# Patient Record
Sex: MALE | Race: OTHER | NOT HISPANIC OR LATINO
[De-identification: names, ages, dates, MRNs, and addresses within clinical notes are randomized per-mention and may not be internally consistent; named-entity substitution may affect disease eponyms.]

---

## 2022-07-13 PROBLEM — Z00.00 ENCOUNTER FOR PREVENTIVE HEALTH EXAMINATION: Status: ACTIVE | Noted: 2022-07-13

## 2022-08-22 ENCOUNTER — APPOINTMENT (OUTPATIENT)
Dept: NEUROLOGY | Facility: CLINIC | Age: 77
End: 2022-08-22

## 2022-08-22 VITALS
WEIGHT: 170 LBS | SYSTOLIC BLOOD PRESSURE: 120 MMHG | DIASTOLIC BLOOD PRESSURE: 72 MMHG | BODY MASS INDEX: 25.18 KG/M2 | HEART RATE: 62 BPM | HEIGHT: 69 IN

## 2022-08-22 DIAGNOSIS — Z85.038 PERSONAL HISTORY OF OTHER MALIGNANT NEOPLASM OF LARGE INTESTINE: ICD-10-CM

## 2022-08-22 PROCEDURE — 99205 OFFICE O/P NEW HI 60 MIN: CPT

## 2022-08-22 RX ORDER — CYCLOSPORINE 0.5 MG/ML
0.05 EMULSION OPHTHALMIC
Qty: 180 | Refills: 0 | Status: ACTIVE | COMMUNITY
Start: 2022-08-16

## 2022-08-22 NOTE — HISTORY OF PRESENT ILLNESS
[FreeTextEntry1] : The patient is a 77-year-old right-handed man who comes to be evaluated for tremors and trouble walking.\par \par As for the tremor, he has had tremors of his hands for least the last 3 to 4 years, which has been recently progressed.  The tremors are present at rest but also when he is holding a cup or trying to use utensils.  The tremor affects both hands, though he is more aware of the right hand.  There is no voice or leg tremors.  The difficulty walking is a more recent development, since about May 2021.\par \par His voice is a little lower, and he has decreased blink rate.  He has no drooling or dysphagia.  He does have trouble turning over in bed.  His handwriting has become smaller.  He does need some help with dressing but otherwise is independent.  He has fallen, without loss of consciousness.  He has no freezing of gait, but does feel his left leg gets stuck sometimes.\par \par His memory is poor.  He does have anxiety but no depression.  He has no hallucinations.  He does have a history of acting out his dreams and watching TV at night but he no longer does that.  He has no constipation, has an ostomy bag since he had colon cancer.  He also had prostate cancer.  He has no orthostasis.  There is no known family history of Parkinson disease or other movement disorders.  So far he has not had any imaging or work-up.

## 2022-08-22 NOTE — PHYSICAL EXAM
[Person] : oriented to person [Place] : oriented to place [Time] : oriented to time [Registration Intact] : recent registration memory intact [Naming Objects] : no difficulty naming common objects [Repeating Phrases] : no difficulty repeating a phrase [Writing A Sentence] : no difficulty writing a sentence [Fluency] : fluency intact [Comprehension] : comprehension intact [Reading] : reading intact [Cranial Nerves Optic (II)] : visual acuity intact bilaterally,  visual fields full to confrontation, pupils equal round and reactive to light [Cranial Nerves Oculomotor (III)] : extraocular motion intact [Motor Strength] : muscle strength was normal in all four extremities [Motor Handedness Right-Handed] : the patient is right hand dominant [Short Term Intact] : short term memory impaired [Cranial Nerves Trigeminal (V)] : facial sensation intact symmetrically [Cranial Nerves Facial (VII)] : face symmetrical [Cranial Nerves Vestibulocochlear (VIII)] : hearing was intact bilaterally [Cranial Nerves Glossopharyngeal (IX)] : tongue and palate midline [Cranial Nerves Accessory (XI - Cranial And Spinal)] : head turning and shoulder shrug symmetric [Cranial Nerves Hypoglossal (XII)] : there was no tongue deviation with protrusion [Sensation Tactile Decrease] : light touch was intact [Sensation Vibration Decrease] : vibration was intact [Dysdiadochokinesia Bilaterally] : not present [Coordination - Dysmetria Impaired Finger-to-Nose Bilateral] : not present [Coordination - Dysmetria Impaired Heel-to-Shin Bilateral] : not present [1+] : Ankle jerk left 1+ [Plantar Reflex Right Only] : normal on the right [Plantar Reflex Left Only] : normal on the left [FreeTextEntry1] : Blink rate was reduced, but facial expression was otherwise normal.  Voice was slightly lower.  Extraocular movements were intact with normal saccades and no square wave jerks seen.  Tone was moderately increased at the neck, but minimally if at all increased in the arms and legs.  Rapid alternating movements and finger taps were slowed bilaterally, slightly worse on the right.  Foot tap and toe tap were slowed bilaterally, worse on the left.\par He got up from the chair without using his arms.  His posture was mildly stooped.  Gait was slow with reduced stride and mild left leg dragging.  Pull test was negative.\par He had a mild intermittent bilateral rest tremor, worse on the right.  He also had a left greater than kinetic tremor.  Spiral drawing was more shaky on the left with a clear axis.

## 2022-08-22 NOTE — DISCUSSION/SUMMARY
[FreeTextEntry1] : In summary, the patient is 77-year-old right-handed man with hand tremors and difficulty walking.  The examination was significant for right greater than left resting tremor with left greater than right kinetic tremor, neck rigidity, bilateral bradykinesia and slow walking.  He also did have some issues with memory.\par \par Overall, the history examination is probably consistent with more than one diagnosis.  The kinetic tremor is most consistent with essential tremor, which is mild, and also explains his difficulty using utensils and cups.  However the rest tremor With the bradykinesia and slow walking is more consistent with a parkinsonian disorder.  While a DaTscan might clarify the presence of a dopaminergic deficit, he has already received significant radiation from his cancer treatment, and I am hesitant to add more.\par \par Because of the memory issues, I did recommend an MRI of the brain, which also would assess for vascular parkinsonism.  I also recommend that memory labs be checked.\par \par As for treatment I did recommend a trial of levodopa, starting with 1 pill a day, leading up to 1 pill 3 times a day.  At that point he can call me and report how he is doing.  I would not start any medications for his essential tremor, since his mild on those medications may affect his memory.\par \par I spent approximately 60 minutes with the patient and his family, examining and discussing the above findings and impression.  Follow-up will be in 5 months, sooner if new problems arise.

## 2022-09-11 ENCOUNTER — OUTPATIENT (OUTPATIENT)
Dept: OUTPATIENT SERVICES | Facility: HOSPITAL | Age: 77
LOS: 1 days | End: 2022-09-11
Payer: MEDICARE

## 2022-09-11 ENCOUNTER — APPOINTMENT (OUTPATIENT)
Dept: MRI IMAGING | Facility: CLINIC | Age: 77
End: 2022-09-11

## 2022-09-11 DIAGNOSIS — Z00.8 ENCOUNTER FOR OTHER GENERAL EXAMINATION: ICD-10-CM

## 2022-09-11 PROCEDURE — 70551 MRI BRAIN STEM W/O DYE: CPT | Mod: 26,MH

## 2022-09-11 PROCEDURE — 70551 MRI BRAIN STEM W/O DYE: CPT

## 2022-09-19 LAB
FOLATE SERPL-MCNC: 18.9 NG/ML
TSH SERPL-ACNC: 0.98 UIU/ML
VIT B12 SERPL-MCNC: 472 PG/ML

## 2022-09-23 ENCOUNTER — NON-APPOINTMENT (OUTPATIENT)
Age: 77
End: 2022-09-23

## 2022-09-25 ENCOUNTER — TRANSCRIPTION ENCOUNTER (OUTPATIENT)
Age: 77
End: 2022-09-25

## 2022-11-11 ENCOUNTER — NON-APPOINTMENT (OUTPATIENT)
Age: 77
End: 2022-11-11

## 2022-11-15 ENCOUNTER — RX RENEWAL (OUTPATIENT)
Age: 77
End: 2022-11-15

## 2022-12-16 ENCOUNTER — APPOINTMENT (OUTPATIENT)
Dept: COLORECTAL SURGERY | Facility: CLINIC | Age: 77
End: 2022-12-16
Payer: MEDICARE

## 2022-12-16 VITALS
BODY MASS INDEX: 25.62 KG/M2 | HEART RATE: 69 BPM | HEIGHT: 69 IN | DIASTOLIC BLOOD PRESSURE: 63 MMHG | SYSTOLIC BLOOD PRESSURE: 100 MMHG | OXYGEN SATURATION: 98 % | WEIGHT: 173 LBS | TEMPERATURE: 98.3 F

## 2022-12-16 PROCEDURE — 99212 OFFICE O/P EST SF 10 MIN: CPT

## 2022-12-16 PROCEDURE — 99202 OFFICE O/P NEW SF 15 MIN: CPT

## 2022-12-16 RX ORDER — NIRMATRELVIR AND RITONAVIR 300-100 MG
20 X 150 MG & KIT ORAL
Qty: 30 | Refills: 0 | Status: COMPLETED | COMMUNITY
Start: 2022-04-11 | End: 2022-12-16

## 2022-12-16 NOTE — HISTORY OF PRESENT ILLNESS
[FreeTextEntry1] : 77 year old male pt former pt of Dr. Richard at [facility].\par S/p 9/24/19 APR with permanent end colostomy for rectal cancer (stage 1; pT2N0).\par \par CT chest abdomen and pelvis 11/8/21- negative for metastasis\par \par Would like to have his colostomy checked since it's been awhile. \par \par Here for a check up with Dr. Richard\par \par Had radiation cystitis and had 42 2 hour hyperbaric treatment last year to treat that so don't want anymore CT in that area\par \par Doing well\par Appetite good\par Denies fevers, chills, nausea or vomiting\par Colostomy is functioning well just wants to have it checked\par Changes appliance twice a week and as needed for leaks\par Voiding ok \par Takes walks every day\par

## 2022-12-16 NOTE — PHYSICAL EXAM
[No HSM] : no hepatosplenomegaly [Abdomen Masses] : No abdominal masses [Abdomen Tenderness] : ~T No ~M abdominal tenderness [No Rash or Lesion] : No rash or lesion [Alert] : alert [Oriented to Person] : oriented to person [Oriented to Place] : oriented to place [Oriented to Time] : oriented to time [Calm] : calm [de-identified] : Soft, non tender; healthy colostomy; Colostomy appliance changed.  Wearing Fairchance 2 3/4" convex wafer with barrier ring and closed end pouch.  Pt would benefit from 2 1/4" convex but wife is comfortable with she is currently using and does not want to change [de-identified] : Perineal flap - well healed, no issues [de-identified] : Normal [de-identified] : Normal [de-identified] : Normal [de-identified] : Normal [de-identified] : Normal

## 2022-12-16 NOTE — ASSESSMENT
[FreeTextEntry1] : 77 M here for follow up. He is s/p APR w/ perineal flap three years ago. Doing well. he had stage 1 rectal cancer.\par Plan:\par continue with colonoscopy surveillance as recommended by Dr Paez.\par See again as needed.\par All questions answered.

## 2023-02-15 ENCOUNTER — APPOINTMENT (OUTPATIENT)
Dept: NEUROLOGY | Facility: CLINIC | Age: 78
End: 2023-02-15
Payer: MEDICARE

## 2023-02-15 VITALS
SYSTOLIC BLOOD PRESSURE: 134 MMHG | HEIGHT: 69 IN | DIASTOLIC BLOOD PRESSURE: 72 MMHG | WEIGHT: 180 LBS | BODY MASS INDEX: 26.66 KG/M2 | HEART RATE: 64 BPM

## 2023-02-15 PROCEDURE — 99214 OFFICE O/P EST MOD 30 MIN: CPT

## 2023-02-15 NOTE — PHYSICAL EXAM
[Person] : oriented to person [Place] : oriented to place [Time] : oriented to time [Naming Objects] : no difficulty naming common objects [Repeating Phrases] : no difficulty repeating a phrase [Fluency] : fluency intact [Comprehension] : comprehension intact [Cranial Nerves Optic (II)] : visual acuity intact bilaterally,  visual fields full to confrontation, pupils equal round and reactive to light [Cranial Nerves Oculomotor (III)] : extraocular motion intact [Cranial Nerves Trigeminal (V)] : facial sensation intact symmetrically [Cranial Nerves Facial (VII)] : face symmetrical [Cranial Nerves Vestibulocochlear (VIII)] : hearing was intact bilaterally [Cranial Nerves Accessory (XI - Cranial And Spinal)] : head turning and shoulder shrug symmetric [Cranial Nerves Glossopharyngeal (IX)] : tongue and palate midline [Cranial Nerves Hypoglossal (XII)] : there was no tongue deviation with protrusion [Motor Strength] : muscle strength was normal in all four extremities [Motor Handedness Right-Handed] : the patient is right hand dominant [Sensation Tactile Decrease] : light touch was intact [Sensation Vibration Decrease] : vibration was intact [1+] : Ankle jerk left 1+ [Dysdiadochokinesia Bilaterally] : not present [Coordination - Dysmetria Impaired Finger-to-Nose Bilateral] : not present [Coordination - Dysmetria Impaired Heel-to-Shin Bilateral] : not present [Plantar Reflex Right Only] : normal on the right [Plantar Reflex Left Only] : normal on the left [FreeTextEntry1] : Blink rate was reduced, but facial expression was otherwise normal.  Voice was slightly lower.  Extraocular movements were intact with normal saccades and no square wave jerks seen.  \par \par Tone was moderately increased at the neck, but minimally if at all increased in the arms and legs.  \par \par Rapid alternating movements and finger taps were slowed bilaterally, slightly worse on the left today.  Foot tap and toe tap were slowed bilaterally, worse on the left.\par \par He got up from the chair without using his arms.  His posture was mildly stooped.  Gait was slow with improved stride and mild less leg dragging.  Pull test was negative.\par \par No rest tremor today. Spiral drawing was more shaky on the left with a clear axis.

## 2023-02-15 NOTE — DISCUSSION/SUMMARY
[FreeTextEntry1] : In summary, the patient is 77-year-old right-handed man with hand tremors and difficulty walking.  The examination was significant for right greater than left resting tremor with left greater than right kinetic tremor, neck rigidity, bilateral bradykinesia and slow walking.  He also did have some issues with memory.\par \par Overall, the history examination is probably consistent with more than one diagnosis.  \par \par 1. The kinetic tremor is most consistent with essential tremor, which is mild, and also explains his difficulty using utensils and cups.  Can hold off on treatment. \par 2. However the rest tremor With the bradykinesia and slow walking is more consistent with a parkinsonian disorder. This is better on LD\par 3. Continue ASA 81 mg\par 4. Advised to exercise\par \par We discussed the above impression, plan and recommendations during the visit. Counseling represented more then 50% of the 30 minute visit time

## 2023-02-15 NOTE — HISTORY OF PRESENT ILLNESS
[FreeTextEntry1] : The patient is a 77-year-old right-handed man who comes to be evaluated for tremors and trouble walking.\par \par As for the tremor, he has had tremors of his hands for least the last 3 to 4 years, which has been recently progressed.  The tremors are present at rest but also when he is holding a cup or trying to use utensils.  The tremor affects both hands, though he is more aware of the right hand.  There is no voice or leg tremors.  The difficulty walking is a more recent development, since about May 2021.\par \par 1. Started LD, initially developed diarrhea, now on it and it is helping. Takes it morning, midday and evening. \par 2. His voice is better, and he has decreased blink rate.  He has no drooling or dysphagia.  He does have less trouble turning over in bed.  His handwriting has become smaller.  ADLs more independent.  One fall recently. He has no freezing of gait, but does feel his left leg gets stuck sometimes.\par 3. His memory is poor.  He does have anxiety but no depression.  He has no hallucinations.  More talking in his sleeps, no acting out.  He has no constipation, has an ostomy bag since he had colon cancer.  He also had prostate cancer.  He has no orthostasis. \par 4. MRI showed WMD and old lacunar infarcts, advised to start ASA81 mg B12 and TSH were ok

## 2023-04-04 ENCOUNTER — NON-APPOINTMENT (OUTPATIENT)
Age: 78
End: 2023-04-04

## 2023-04-07 ENCOUNTER — OUTPATIENT (OUTPATIENT)
Dept: OUTPATIENT SERVICES | Facility: HOSPITAL | Age: 78
LOS: 1 days | End: 2023-04-07
Payer: MEDICARE

## 2023-04-07 ENCOUNTER — APPOINTMENT (OUTPATIENT)
Dept: MRI IMAGING | Facility: CLINIC | Age: 78
End: 2023-04-07
Payer: MEDICARE

## 2023-04-07 DIAGNOSIS — G20 PARKINSON'S DISEASE: ICD-10-CM

## 2023-04-07 PROCEDURE — 76377 3D RENDER W/INTRP POSTPROCES: CPT

## 2023-04-07 PROCEDURE — 70551 MRI BRAIN STEM W/O DYE: CPT

## 2023-04-07 PROCEDURE — 70547 MR ANGIOGRAPHY NECK W/O DYE: CPT

## 2023-04-07 PROCEDURE — 70547 MR ANGIOGRAPHY NECK W/O DYE: CPT | Mod: 26,MH

## 2023-04-07 PROCEDURE — 70544 MR ANGIOGRAPHY HEAD W/O DYE: CPT

## 2023-04-07 PROCEDURE — 70551 MRI BRAIN STEM W/O DYE: CPT | Mod: 26,MH

## 2023-04-07 PROCEDURE — 70544 MR ANGIOGRAPHY HEAD W/O DYE: CPT | Mod: 26,MH,59

## 2023-04-10 ENCOUNTER — NON-APPOINTMENT (OUTPATIENT)
Age: 78
End: 2023-04-10

## 2023-05-03 ENCOUNTER — INPATIENT (INPATIENT)
Facility: HOSPITAL | Age: 78
LOS: 1 days | Discharge: HOME | End: 2023-05-05
Attending: INTERNAL MEDICINE | Admitting: INTERNAL MEDICINE
Payer: MEDICARE

## 2023-05-03 VITALS
HEART RATE: 68 BPM | RESPIRATION RATE: 18 BRPM | TEMPERATURE: 99 F | OXYGEN SATURATION: 100 % | DIASTOLIC BLOOD PRESSURE: 60 MMHG | SYSTOLIC BLOOD PRESSURE: 123 MMHG

## 2023-05-03 DIAGNOSIS — R47.81 SLURRED SPEECH: ICD-10-CM

## 2023-05-03 DIAGNOSIS — Z90.49 ACQUIRED ABSENCE OF OTHER SPECIFIED PARTS OF DIGESTIVE TRACT: Chronic | ICD-10-CM

## 2023-05-03 DIAGNOSIS — K62.7 RADIATION PROCTITIS: ICD-10-CM

## 2023-05-03 DIAGNOSIS — C61 MALIGNANT NEOPLASM OF PROSTATE: ICD-10-CM

## 2023-05-03 DIAGNOSIS — R47.1 DYSARTHRIA AND ANARTHRIA: ICD-10-CM

## 2023-05-03 DIAGNOSIS — Z86.73 PERSONAL HISTORY OF TRANSIENT ISCHEMIC ATTACK (TIA), AND CEREBRAL INFARCTION WITHOUT RESIDUAL DEFICITS: ICD-10-CM

## 2023-05-03 DIAGNOSIS — Z29.9 ENCOUNTER FOR PROPHYLACTIC MEASURES, UNSPECIFIED: ICD-10-CM

## 2023-05-03 LAB
ALBUMIN SERPL ELPH-MCNC: 4.5 G/DL — SIGNIFICANT CHANGE UP (ref 3.3–5)
ALP SERPL-CCNC: 68 U/L — SIGNIFICANT CHANGE UP (ref 40–120)
ALT FLD-CCNC: 15 U/L — SIGNIFICANT CHANGE UP (ref 4–41)
ANION GAP SERPL CALC-SCNC: 10 MMOL/L — SIGNIFICANT CHANGE UP (ref 7–14)
APPEARANCE UR: ABNORMAL
APTT BLD: 31 SEC — SIGNIFICANT CHANGE UP (ref 27–36.3)
AST SERPL-CCNC: 17 U/L — SIGNIFICANT CHANGE UP (ref 4–40)
BACTERIA # UR AUTO: NEGATIVE — SIGNIFICANT CHANGE UP
BASE EXCESS BLDV CALC-SCNC: 2.8 MMOL/L — SIGNIFICANT CHANGE UP (ref -2–3)
BASOPHILS # BLD AUTO: 0.04 K/UL — SIGNIFICANT CHANGE UP (ref 0–0.2)
BASOPHILS NFR BLD AUTO: 0.5 % — SIGNIFICANT CHANGE UP (ref 0–2)
BILIRUB SERPL-MCNC: 0.5 MG/DL — SIGNIFICANT CHANGE UP (ref 0.2–1.2)
BILIRUB UR-MCNC: NEGATIVE — SIGNIFICANT CHANGE UP
BLOOD GAS VENOUS COMPREHENSIVE RESULT: SIGNIFICANT CHANGE UP
BUN SERPL-MCNC: 18 MG/DL — SIGNIFICANT CHANGE UP (ref 7–23)
CALCIUM SERPL-MCNC: 9.6 MG/DL — SIGNIFICANT CHANGE UP (ref 8.4–10.5)
CHLORIDE BLDV-SCNC: 104 MMOL/L — SIGNIFICANT CHANGE UP (ref 96–108)
CHLORIDE SERPL-SCNC: 104 MMOL/L — SIGNIFICANT CHANGE UP (ref 98–107)
CO2 BLDV-SCNC: 31 MMOL/L — HIGH (ref 22–26)
CO2 SERPL-SCNC: 26 MMOL/L — SIGNIFICANT CHANGE UP (ref 22–31)
COLOR SPEC: YELLOW — SIGNIFICANT CHANGE UP
CREAT SERPL-MCNC: 0.96 MG/DL — SIGNIFICANT CHANGE UP (ref 0.5–1.3)
DIFF PNL FLD: NEGATIVE — SIGNIFICANT CHANGE UP
EGFR: 81 ML/MIN/1.73M2 — SIGNIFICANT CHANGE UP
EOSINOPHIL # BLD AUTO: 0.21 K/UL — SIGNIFICANT CHANGE UP (ref 0–0.5)
EOSINOPHIL NFR BLD AUTO: 2.4 % — SIGNIFICANT CHANGE UP (ref 0–6)
EPI CELLS # UR: 1 /HPF — SIGNIFICANT CHANGE UP (ref 0–5)
GAS PNL BLDV: 137 MMOL/L — SIGNIFICANT CHANGE UP (ref 136–145)
GLUCOSE BLDC GLUCOMTR-MCNC: 132 MG/DL — HIGH (ref 70–99)
GLUCOSE BLDV-MCNC: 95 MG/DL — SIGNIFICANT CHANGE UP (ref 70–99)
GLUCOSE SERPL-MCNC: 98 MG/DL — SIGNIFICANT CHANGE UP (ref 70–99)
GLUCOSE UR QL: NEGATIVE — SIGNIFICANT CHANGE UP
HCO3 BLDV-SCNC: 29 MMOL/L — SIGNIFICANT CHANGE UP (ref 22–29)
HCT VFR BLD CALC: 42.6 % — SIGNIFICANT CHANGE UP (ref 39–50)
HCT VFR BLDA CALC: 41 % — SIGNIFICANT CHANGE UP (ref 39–51)
HGB BLD CALC-MCNC: 13.8 G/DL — SIGNIFICANT CHANGE UP (ref 12.6–17.4)
HGB BLD-MCNC: 13.6 G/DL — SIGNIFICANT CHANGE UP (ref 13–17)
IANC: 6.68 K/UL — SIGNIFICANT CHANGE UP (ref 1.8–7.4)
IMM GRANULOCYTES NFR BLD AUTO: 0.8 % — SIGNIFICANT CHANGE UP (ref 0–0.9)
INR BLD: 1.03 RATIO — SIGNIFICANT CHANGE UP (ref 0.88–1.16)
KETONES UR-MCNC: NEGATIVE — SIGNIFICANT CHANGE UP
LACTATE BLDV-MCNC: 0.9 MMOL/L — SIGNIFICANT CHANGE UP (ref 0.5–2)
LEUKOCYTE ESTERASE UR-ACNC: NEGATIVE — SIGNIFICANT CHANGE UP
LYMPHOCYTES # BLD AUTO: 1.11 K/UL — SIGNIFICANT CHANGE UP (ref 1–3.3)
LYMPHOCYTES # BLD AUTO: 12.6 % — LOW (ref 13–44)
MCHC RBC-ENTMCNC: 28.9 PG — SIGNIFICANT CHANGE UP (ref 27–34)
MCHC RBC-ENTMCNC: 31.9 GM/DL — LOW (ref 32–36)
MCV RBC AUTO: 90.4 FL — SIGNIFICANT CHANGE UP (ref 80–100)
MONOCYTES # BLD AUTO: 0.73 K/UL — SIGNIFICANT CHANGE UP (ref 0–0.9)
MONOCYTES NFR BLD AUTO: 8.3 % — SIGNIFICANT CHANGE UP (ref 2–14)
NEUTROPHILS # BLD AUTO: 6.68 K/UL — SIGNIFICANT CHANGE UP (ref 1.8–7.4)
NEUTROPHILS NFR BLD AUTO: 75.4 % — SIGNIFICANT CHANGE UP (ref 43–77)
NITRITE UR-MCNC: NEGATIVE — SIGNIFICANT CHANGE UP
NRBC # BLD: 0 /100 WBCS — SIGNIFICANT CHANGE UP (ref 0–0)
NRBC # FLD: 0 K/UL — SIGNIFICANT CHANGE UP (ref 0–0)
PCO2 BLDV: 52 MMHG — SIGNIFICANT CHANGE UP (ref 42–55)
PH BLDV: 7.36 — SIGNIFICANT CHANGE UP (ref 7.32–7.43)
PH UR: 6.5 — SIGNIFICANT CHANGE UP (ref 5–8)
PLATELET # BLD AUTO: 207 K/UL — SIGNIFICANT CHANGE UP (ref 150–400)
PO2 BLDV: 22 MMHG — LOW (ref 25–45)
POTASSIUM BLDV-SCNC: 4.1 MMOL/L — SIGNIFICANT CHANGE UP (ref 3.5–5.1)
POTASSIUM SERPL-MCNC: 4.2 MMOL/L — SIGNIFICANT CHANGE UP (ref 3.5–5.3)
POTASSIUM SERPL-SCNC: 4.2 MMOL/L — SIGNIFICANT CHANGE UP (ref 3.5–5.3)
PROT SERPL-MCNC: 7.2 G/DL — SIGNIFICANT CHANGE UP (ref 6–8.3)
PROT UR-MCNC: ABNORMAL
PROTHROM AB SERPL-ACNC: 12 SEC — SIGNIFICANT CHANGE UP (ref 10.5–13.4)
RBC # BLD: 4.71 M/UL — SIGNIFICANT CHANGE UP (ref 4.2–5.8)
RBC # FLD: 14.2 % — SIGNIFICANT CHANGE UP (ref 10.3–14.5)
RBC CASTS # UR COMP ASSIST: 2 /HPF — SIGNIFICANT CHANGE UP (ref 0–4)
SAO2 % BLDV: 25.2 % — LOW (ref 67–88)
SODIUM SERPL-SCNC: 140 MMOL/L — SIGNIFICANT CHANGE UP (ref 135–145)
SP GR SPEC: 1.02 — SIGNIFICANT CHANGE UP (ref 1.01–1.05)
TROPONIN T, HIGH SENSITIVITY RESULT: 16 NG/L — SIGNIFICANT CHANGE UP
UROBILINOGEN FLD QL: SIGNIFICANT CHANGE UP
WBC # BLD: 8.84 K/UL — SIGNIFICANT CHANGE UP (ref 3.8–10.5)
WBC # FLD AUTO: 8.84 K/UL — SIGNIFICANT CHANGE UP (ref 3.8–10.5)
WBC UR QL: 2 /HPF — SIGNIFICANT CHANGE UP (ref 0–5)

## 2023-05-03 PROCEDURE — 99223 1ST HOSP IP/OBS HIGH 75: CPT | Mod: GC

## 2023-05-03 PROCEDURE — 99285 EMERGENCY DEPT VISIT HI MDM: CPT

## 2023-05-03 PROCEDURE — 71045 X-RAY EXAM CHEST 1 VIEW: CPT | Mod: 26

## 2023-05-03 PROCEDURE — 99223 1ST HOSP IP/OBS HIGH 75: CPT | Mod: GC,AI

## 2023-05-03 RX ORDER — CARBIDOPA AND LEVODOPA 25; 100 MG/1; MG/1
1 TABLET ORAL
Refills: 0 | DISCHARGE

## 2023-05-03 RX ORDER — SODIUM CHLORIDE 9 MG/ML
1000 INJECTION INTRAMUSCULAR; INTRAVENOUS; SUBCUTANEOUS ONCE
Refills: 0 | Status: COMPLETED | OUTPATIENT
Start: 2023-05-03 | End: 2023-05-03

## 2023-05-03 RX ORDER — ENOXAPARIN SODIUM 100 MG/ML
40 INJECTION SUBCUTANEOUS EVERY 24 HOURS
Refills: 0 | Status: DISCONTINUED | OUTPATIENT
Start: 2023-05-03 | End: 2023-05-05

## 2023-05-03 RX ORDER — CARBIDOPA AND LEVODOPA 25; 100 MG/1; MG/1
1 TABLET ORAL THREE TIMES A DAY
Refills: 0 | Status: DISCONTINUED | OUTPATIENT
Start: 2023-05-03 | End: 2023-05-05

## 2023-05-03 RX ADMIN — SODIUM CHLORIDE 1000 MILLILITER(S): 9 INJECTION INTRAMUSCULAR; INTRAVENOUS; SUBCUTANEOUS at 09:35

## 2023-05-03 RX ADMIN — CARBIDOPA AND LEVODOPA 1 TABLET(S): 25; 100 TABLET ORAL at 18:12

## 2023-05-03 RX ADMIN — SODIUM CHLORIDE 1000 MILLILITER(S): 9 INJECTION INTRAMUSCULAR; INTRAVENOUS; SUBCUTANEOUS at 10:50

## 2023-05-03 NOTE — CONSULT NOTE ADULT - ATTENDING COMMENTS
Mr. RUDD is a 77 year old right handed man with PMHx of Parkinson's disease on sinemet 25/100 mg TID who was brought to [facility] ER due to the episodes of freezing, slurred speech. During my evaluation, patient son and wife were present and I appreciated that.  Patient and family reported that patient is feeling better. No focal deficit but consistent with bradykinesia, hypophonic speech, mildly increase the tone, cogwheel rigidity and mild postural tremors. Patient will benefit from further work up to rule out treatable etiologies including VEEG and metabolic work up.   I discussed the diagnosis, treatment plan and prognosis with the patient and family. Risk benefit and alternatives to the treatment were discussed. All questions and concerns were addressed. The patient demonstrated good understanding of the treatment plan.  If you have any further questions, please do not hesitate to contact our team.  Thank you for allowing us to participate in this patient care.

## 2023-05-03 NOTE — H&P ADULT - NSHPLABSRESULTS_GEN_ALL_CORE
LABS: When present labs, imaging, and ECG were personally reviewed                          13.6   8.84  )-----------( 207      ( 03 May 2023 07:35 )             42.6       05-03    140  |  104  |  18  ----------------------------<  98  4.2   |  26  |  0.96    Ca    9.6      03 May 2023 07:35    TPro  7.2  /  Alb  4.5  /  TBili  0.5  /  DBili  x   /  AST  17  /  ALT  15  /  AlkPhos  68  05-03       LIVER FUNCTIONS - ( 03 May 2023 07:35 )  Alb: 4.5 g/dL / Pro: 7.2 g/dL / ALK PHOS: 68 U/L / ALT: 15 U/L / AST: 17 U/L / GGT: x                    Urinalysis Basic - ( 03 May 2023 10:22 )    Color: Yellow / Appearance: Slightly Turbid / S.019 / pH: x  Gluc: x / Ketone: Negative  / Bili: Negative / Urobili: <2 mg/dL   Blood: x / Protein: Trace / Nitrite: Negative   Leuk Esterase: Negative / RBC: 2 /HPF / WBC 2 /HPF   Sq Epi: x / Non Sq Epi: x / Bacteria: Negative        PT/INR - ( 03 May 2023 07:35 )   PT: 12.0 sec;   INR: 1.03 ratio         PTT - ( 03 May 2023 07:35 )  PTT:31.0 sec    Lactate Trend            CAPILLARY BLOOD GLUCOSE      POCT Blood Glucose.: 132 mg/dL (03 May 2023 14:45)            RADIOLOGY & ADDITIONAL TESTS: < from: MR Angio Neck No Cont (23 @ 11:15) >      IMPRESSION: Abnormal areas of susceptibility involving the anterior right   thalamic and left cerebellar region. The rest of this MRI of the brain   appears unchanged when compared with the prior exam.    Unremarkable MRA of the neck and [place] of [place].    < end of copied text >

## 2023-05-03 NOTE — CONSULT NOTE ADULT - ASSESSMENT
Patient ADELAIDE MENDOZA is a 76yo M PMHx of PD on sinemet, prior colon CA w/ ostomy, prior prostate CA s/p radiation cystitis, blurry vision 2/2 cataracts baseline, who presents to ED for episodes of freezing, slurred speech. Patient followed by Dr Ortega (movement specialist). History from son and spouse bedside who reported that patient few weeks back had similar episode and since yesterday 5/2/23 has had multiple episodes with resolution of freezing, paucity of speech, slurred speech in between, confusion, not making sense (stating he is coming to the hospital to see eye doctor), inability to walk. Episodes still occurring while in hospital. Not on anticoagulant, antiplatelet agents given reported concern of hematuria from prior prostate issues.      Impression: Episodes of freezing of movement, speech, slurred speech intermittently, confusion, difficulty walking with unclear etiology. Differential includes toxic/metabolic/ infectious etiology suspected. Consider checking urine for infection or other sources of infection. Would be unusual for multiple episodes with resolution to be TIA/ ischemic stroke. Can consider nonconvulsive seizures if infectious workup is negative.     Recommendations:  [ ] Consider toxic/metabolic/ infectious etiology  [ ] Discussed with his movement specialist. Does not suspect from medication or PD symptoms.      INCOMPLETE Patient ADELAIDE MENDOZA is a 78yo M PMHx of PD on sinemet, prior colon CA w/ ostomy, prior prostate CA s/p radiation cystitis, blurry vision 2/2 cataracts baseline, who presents to ED for episodes of freezing, slurred speech. Patient followed by Dr Ortega (movement specialist). History from son and spouse bedside who reported that patient few weeks back had similar episode and since yesterday 5/2/23 has had multiple episodes with resolution of freezing, paucity of speech, slurred speech in between, confusion, not making sense (stating he is coming to the hospital to see eye doctor), inability to walk. Episodes still occurring while in hospital. Not on anticoagulant, antiplatelet agents given reported concern of hematuria from prior prostate issues. MRI done 4/7/23 showed old lacunar infarcts b/l BG and CR, old hemorrhage mineralization vs cavernous angioma in R thalamic region. Normal flow voids in vessels.      Impression: Episodes of freezing of movement, speech, slurred speech intermittently, confusion, difficulty walking with unclear etiology. Differential includes toxic/metabolic/ infectious etiology suspected. Consider checking urine for infection or other sources of infection. Would be unusual for multiple episodes with resolution to be TIA/ ischemic stroke. Can consider nonconvulsive seizures if infectious workup is negative.     Recommendations:  [ ] Consider toxic/metabolic/ infectious etiology  [ ] Discussed with his movement specialist. Does not suspect from medication or PD symptoms and to consider alternative diagnoses  [ ] Continue sinemet 25/100, 1 tab TID  [ ] If not found with infectious etiology as source of symptoms, can consider MRI brain w/ and w/o contrast and routine awake/asleep EEG. Patient family hesitant regarding obtaining CT imaging currently. Given symptoms appear atypical for being vascular in etiology, can hold off unless symptoms worsen or involve focal deficits.   [ ] From secondary stroke prevention, patient should be on aspirin 81mg daily given prior infarcts seen on recent MRI.   [ ] Atorvastatin 40-80 mg daily titrated per LDL < 70  [ ] HgbA1C, fasting lipid panel,     [ ] tele   [ ] Rest of neuro recommendations after being staffed by neuro attending      - Neuro-checks and VS q4h   - Dysphagia screen. If fails, speech/swallow eval  - aspiration, fall precautions  - STAT CT head non-contrast for change in neuro exam.   - PT/ OT / DVT ppx per primary team      Discussed patient care with primary team, patient and in agreement. Discussed risks/benefits of diagnostic, treatment options w/ patient, spouse and son at bedside and agreeable. Non-neurologic findings seen on imaging to be worked up per primary team if applicable. Recommendations will be finalized/amended once seen and signed by attending. Patient ADELAIDE MENDOZA is a 76yo M PMHx of PD on sinemet, prior colon CA w/ ostomy, prior prostate CA s/p radiation cystitis, blurry vision 2/2 cataracts baseline, who presents to ED for episodes of freezing, slurred speech. Patient followed by Dr Ortega (movement specialist). History from son and spouse bedside who reported that patient few weeks back had similar episode and since yesterday 5/2/23 has had multiple episodes with resolution of freezing, paucity of speech, slurred speech in between, confusion, not making sense (stating he is coming to the hospital to see eye doctor), inability to walk. Episodes still occurring while in hospital. Not on anticoagulant, antiplatelet agents given reported concern of hematuria from prior prostate issues. MRI done 4/7/23 showed old lacunar infarcts b/l BG and CR, old hemorrhage mineralization vs cavernous angioma in R thalamic region. Normal flow voids in vessels.      Impression: Episodes of freezing of movement, speech, slurred speech intermittently, confusion, difficulty walking with unclear etiology. Differential includes toxic/metabolic/ infectious etiology suspected. Consider checking urine for infection or other sources of infection. Would be unusual for multiple episodes with resolution to be TIA/ ischemic stroke. Can consider nonconvulsive seizures if infectious workup is negative.     Recommendations:  [ ] Consider toxic/metabolic/ infectious etiology  [ ] Discussed with his movement specialist. Does not suspect from medication or PD symptoms and to consider alternative diagnoses  [ ] Continue sinemet 25/100, 1 tab TID  [ ] If not found with infectious etiology as source of symptoms, can consider MRI brain w/ and w/o contrast and routine awake/asleep EEG. Patient family hesitant regarding obtaining CT imaging currently. Given symptoms appear atypical for being vascular in etiology, can hold off unless symptoms worsen or involve focal deficits.   [ ] From secondary stroke prevention, patient should be on aspirin 81mg daily given prior infarcts seen on recent MRI.   [ ] Atorvastatin 40-80 mg daily titrated per LDL < 70  [ ] HgbA1C, fasting lipid panel,     [ ] tele   [ ] Rest of neuro recommendations after being staffed by neuro attending. Please do not discharge patient before discussing with neuro attending.     - Neuro-checks and VS q4h   - Dysphagia screen. If fails, speech/swallow eval  - aspiration, fall precautions  - STAT CT head non-contrast for change in neuro exam.   - PT/ OT / DVT ppx per primary team      Discussed patient care and above recommendations with primary team (ED), patient, family bedside and in agreement. Discussed risks/benefits of diagnostic, treatment options w/ patient, spouse and son at bedside and agreeable. Non-neurologic findings seen on imaging to be worked up per primary team if applicable. Recommendations will be finalized/amended once seen and signed by attending. Patient ADELAIDE MENDOZA is a 76yo M PMHx of PD on sinemet, prior colon CA w/ ostomy, prior prostate CA s/p radiation cystitis, blurry vision 2/2 cataracts baseline, who presents to ED for episodes of freezing, slurred speech. Patient followed by Dr Ortega (movement specialist). History from son and spouse bedside who reported that patient few weeks back had similar episode and since yesterday 5/2/23 has had multiple episodes with resolution of freezing, paucity of speech, slurred speech in between, confusion, not making sense (stating he is coming to the hospital to see eye doctor), inability to walk. Episodes still occurring while in hospital. Not on anticoagulant, antiplatelet agents given reported concern of hematuria from prior prostate issues. MRI done 4/7/23 showed old lacunar infarcts b/l BG and CR, old hemorrhage mineralization vs cavernous angioma in R thalamic region. Normal flow voids in vessels.      Impression: Episodes of freezing of movement, speech, slurred speech intermittently, confusion, difficulty walking with unclear etiology. Differential includes toxic/metabolic/ infectious etiology suspected. Consider checking urine for infection or other sources of infection. Would be unusual for multiple episodes with resolution to be TIA/ ischemic stroke. Can consider nonconvulsive seizures if infectious workup is negative.     Recommendations:  [ ] Consider toxic/metabolic/ infectious etiology  [ ] Discussed with his movement specialist. Does not suspect from medication or PD symptoms and to consider alternative diagnoses  [ ] Continue sinemet 25/100, 1 tab TID  [ ] If not found with infectious etiology as source of symptoms, can consider 24 hour EEG. Patient family hesitant regarding obtaining CT imaging currently. Given symptoms appear atypical for being vascular in etiology, can hold off unless symptoms worsen or involve focal deficits.   [ ] From secondary stroke prevention, patient should be on aspirin 81mg daily given prior infarcts seen on recent MRI.   [ ] Atorvastatin 40-80 mg daily titrated per LDL < 70  [ ] HgbA1C, fasting lipid panel,     [ ] tele   [ ] Rest of neuro recommendations after being staffed by neuro attending. Please do not discharge patient before discussing with neuro attending.     - Neuro-checks and VS q4h   - Dysphagia screen. If fails, speech/swallow eval  - aspiration, fall precautions  - STAT CT head non-contrast for change in neuro exam.   - PT/ OT / DVT ppx per primary team      Discussed patient care and above recommendations with primary team (ED), patient, family bedside and in agreement. Discussed risks/benefits of diagnostic, treatment options w/ patient, spouse and son at bedside and agreeable. Non-neurologic findings seen on imaging to be worked up per primary team if applicable. Recommendations will be finalized/amended once seen and signed by attending. Patient ADELAIDE MENDOZA is a 78yo M PMHx of PD on sinemet, prior colon CA w/ ostomy, prior prostate CA s/p radiation cystitis, blurry vision 2/2 cataracts baseline, who presents to ED for episodes of freezing, slurred speech. Patient followed by Dr Ortega (movement specialist). History from son and spouse bedside who reported that patient few weeks back had similar episode and since yesterday 5/2/23 has had multiple episodes with resolution of freezing, paucity of speech, slurred speech in between, confusion, not making sense (stating he is coming to the hospital to see eye doctor), inability to walk. Episodes still occurring while in hospital. Not on anticoagulant, antiplatelet agents given reported concern of hematuria from prior prostate issues. MRI done 4/7/23 showed old lacunar infarcts b/l BG and CR, old hemorrhage mineralization vs cavernous angioma in R thalamic region. Normal flow voids in vessels.      Impression: Episodes of freezing of movement, speech, slurred speech intermittently, confusion, difficulty walking with unclear etiology. Differential includes toxic/metabolic/ infectious etiology suspected. Consider checking urine for infection or other sources of infection. Would be unusual for multiple episodes with resolution to be TIA/ ischemic stroke. Can consider nonconvulsive seizures if infectious workup is negative.     Recommendations:  [ ] Consider toxic/metabolic/ infectious etiology  [ ] Discussed with his movement specialist. Does not suspect from medication or PD symptoms and to consider alternative diagnoses  [ ] Continue sinemet 25/100, 1 tab TID  [ ] If not found with infectious etiology as source of symptoms, can consider 24 hour EEG. Patient family hesitant regarding obtaining CT imaging currently. Given symptoms appear atypical for being vascular in etiology, can hold off unless symptoms worsen or involve focal deficits.   [ ] From secondary stroke prevention, patient should be on aspirin 81mg daily given prior infarcts seen on recent MRI.   [ ] Atorvastatin 40-80 mg daily titrated per LDL < 70  [ ] HgbA1C, fasting lipid panel, ESR, CRP, TSH, T3/T4, A1c, thiamine, B12, folate   [ ] tele   [ ] Rest of neuro recommendations after being staffed by neuro attending. Please do not discharge patient before discussing with neuro attending.     - Neuro-checks and VS q4h   - Dysphagia screen. If fails, speech/swallow eval  - aspiration, fall precautions  - STAT CT head non-contrast for change in neuro exam.   - PT/ OT / DVT ppx per primary team      Discussed patient care and above recommendations with primary team (ED), patient, family bedside and in agreement. Discussed risks/benefits of diagnostic, treatment options w/ patient, spouse and son at bedside and agreeable. Non-neurologic findings seen on imaging to be worked up per primary team if applicable. Recommendations will be finalized/amended once seen and signed by attending.

## 2023-05-03 NOTE — H&P ADULT - ATTENDING COMMENTS
77M with hx of Parkinson's Disease, Prior CVA p/w episodes of slurred speech concerning for progression of parkinson's disease vs seizures vs toxic metabolic syndrome.    History obtained from patient and son at bedside. History slightly different from resident HPI in the regard that son reports that about 1 month ago patient had 1 episode of slurred speech, after which his neurologist ordered an MRI that showed old strokes. After this 1 episode, patient was completely back to baseline and had NO further episodes of freezing/slurred speech until yesterday when they restarted. Episodes last from 5 -15 min and have become more frequent. Patient reports he can feel they are coming up and grabs on to something, knows they are happening but cannot move or do anything. Denies confusion/bowel/bladder incontinence. Rest of HPI as above. Of note, patient had Code Stroke in ED for similar symptoms, no CT head was done. Neuro aware and vEEG expedited.     Labs reviewed and largely unremarkable   Patient refused CT head, please refer to ED provider note     #Episodes of altered level of consciousness  - Unclear etiology, seizures vs. TIA vs. progression of PD  - Neuro following and recs appreciated   - Hold off CT head as patient/family declining   - Monitor on tele, check TTE  - vEEG --> expedited by neuro team   - Start on ASA/statin for old lacunar infarcts on MRI   - Infectious work-up negative    Rest of plan as above  Discussed w/ patient and son at bedside   Discussed w/ HS Dr. Edgar

## 2023-05-03 NOTE — ED ADULT NURSE NOTE - NSIMPLEMENTINTERV_GEN_ALL_ED
Implemented All Fall Risk Interventions:  [place] to call system. Call bell, personal items and telephone within reach. Instruct patient to call for assistance. Room bathroom lighting operational. Non-slip footwear when patient is off stretcher. Physically safe environment: no spills, clutter or unnecessary equipment. Stretcher in lowest position, wheels locked, appropriate side rails in place. Provide visual cue, wrist band, yellow gown, etc. Monitor gait and stability. Monitor for mental status changes and reorient to person, place, and time. Review medications for side effects contributing to fall risk. Reinforce activity limits and safety measures with patient and family.

## 2023-05-03 NOTE — ED ADULT TRIAGE NOTE - CHIEF COMPLAINT QUOTE
Patient c/o slurred speech and gait instability 30 minutes prior to arrival, lasting about 10 minutes. States he has since returned to baseline. Denies vision changes, headache and dizziness. Hx TIAs, colon and prostate CA, and Parkinson's. No slurred speech or neuro deficits noted. MD Hobbs called for stroke evaluation, no code stroke activated.

## 2023-05-03 NOTE — ED PROVIDER NOTE - OBJECTIVE STATEMENT
77-year-old male past medical history of Parkinson's disorder and TIAs brought to walk-in triage by family for 24 hours of intermittent slurred speech.  Symptoms started yesterday around 4 AM and have been intermittent throughout the day.  Wife notes episodes last a few minutes and include slurred speech and inability to move.  Patient woke up this morning at 4:30 AM to use restroom and appeared normal at time of getting up to wife.  While patient was at sink trying to wash his hands he became frozen and had trouble with his speech which was recorded by the family.  This episode lasted 10 minutes and entirely resolved.  Patient had a similar event 6 weeks ago for which she underwent an MRI of the brain.  Family was told that he is having TIAs.

## 2023-05-03 NOTE — H&P ADULT - PROBLEM SELECTOR PLAN 3
Patient with history of prostate cancer s/p radiation therapy   Follows with Dr. Ames although looking for a new urologist     Plan:   >c/w elmirinon for radiation proctitis symptoms

## 2023-05-03 NOTE — ED PROVIDER NOTE - PHYSICAL EXAMINATION
CONSTITUTIONAL: Well appearing, well nourished, awake, alert, oriented to person, place, time/situation and in no apparent distress  ENMT: Airway patent  EYES: Clear bilaterally  CARDIAC: Normal rate, regular rhythm.  RESPIRATORY: Breath sounds clear and equal bilaterally.  ABDOMEN: Abdomen soft, non-tender, no guarding  MUSCULOSKELETAL: Spine appears normal, no deformities, equal active FROM bilaterally  NEUROLOGIC: CN II-XII grossly intact, moves all extremities without lateralization, resting tremor b/l UE.  SKIN: Exposed skin normal color for race, warm, dry and intact

## 2023-05-03 NOTE — H&P ADULT - NSHPSOCIALHISTORY_GEN_ALL_CORE
Deniees every smoking, alcohol use, other drug use  Lives at home with wife who helps with ADLs and is HCP Deniees every smoking, alcohol use, other drug use  Lives at home with wife who helps with ADLs and is HCP. Uses a cane when ambulating outside of the house

## 2023-05-03 NOTE — H&P ADULT - NSHPREVIEWOFSYSTEMS_GEN_ALL_CORE
REVIEW OF SYSTEMS:    CONSTITUTIONAL: No fevers or chills; weakness; rigidity  EYES/ENT: No visual changes;  No vertigo or throat pain   NECK: No pain or stiffness  RESPIRATORY: No cough, wheezing, hemoptysis; No shortness of breath  CARDIOVASCULAR: No chest pain or palpitations  GASTROINTESTINAL: No abdominal or epigastric pain. No nausea, vomiting, or hematemesis; No diarrhea or constipation. No melena or hematochezia.  GENITOURINARY: No dysuria, frequency or hematuria  NEUROLOGICAL: No numbness or weakness  SKIN: No itching, rashes REVIEW OF SYSTEMS:    CONSTITUTIONAL: No weakness, fevers or chills  EYES: No visual changes;  No vertigo   ENT: No throat pain. No runny nose.   NECK: No pain or stiffness  RESPIRATORY: No cough, wheezing, hemoptysis; No shortness of breath  CARDIOVASCULAR: No chest pain or palpitations. No LE edema.   GASTROINTESTINAL: No abdominal or epigastric pain. No nausea, vomiting, or hematemesis; No diarrhea or constipation. No melena or hematochezia.  GENITOURINARY: No dysuria, frequency or hematuria  NEUROLOGICAL: per HPI  SKIN: No itching, rashes  MSK: No joint pain or swelling  HEME: No bleeding or bruising. No enlarged lymph nodes.   PSYCH: No auditory or visual hallucinations

## 2023-05-03 NOTE — H&P ADULT - PROBLEM SELECTOR PLAN 5
DVT: Lovenox  Diet: Regular (passed dysphagia screen)  Dispo: Pending PT eval   Ethics: Per son, wife has patient's ADV Directive and HCP forms; wife to bring in forms

## 2023-05-03 NOTE — ED ADULT NURSE REASSESSMENT NOTE - NS ED NURSE REASSESS COMMENT FT1
Pt received in room 9. Pt family notified medical team that patient was having another "attack". when arrived in the room, pt noted with slurred speech, pt reports difficulty moving extremities, and was stuck in standing position. Episode lasted approx. 5 minutes, then pt regained ability to talk, and move. Pt requesting to sit in chair. PA Banks at bedside during episode. Pt pending neurology consult. Awaiting urine sample. Support ongoing. Fall precautions maintained. Jerson

## 2023-05-03 NOTE — ED PROVIDER NOTE - CLINICAL SUMMARY MEDICAL DECISION MAKING FREE TEXT BOX
Patient remained at baseline neurological status during ED course.  Patient declined CT imaging per progress note.  Neurology consult and admission for rule out CVA.

## 2023-05-03 NOTE — STROKE CODE NOTE - DISPOSITION
Code stroke called at 14:45 for patient seen earlier by inpatient neurology service overnight and this morning. Patient's symptoms for this code stroke are similar to the symptoms for which he is presenting, on this occasion, most prominently notable for acute onset of unresponsiveness. On arrival, patient with eyes open w/ no verbal output. Patient drooling, but without obvious facial abnormalities. Patient blinking to threat bilaterally, although questioanbly / slightly less responsive on the right. Also w/ mild drift in RUE, no drift in LUE. No drifts in b/l LE. At the time of LE testing, patient started speaking, gradually improving back to his hospital baseline. No drifts reproduced in RUE on re-examination. Patient subsequently w/ grossly fluent speech, following commands, asking questions about when he is going to be transported to his hospital room. Patient's son reporting that the symptoms he witnessed were similar in character to prior episodes, although this time, patient's symptoms seemed worse. Code stroke cancelled. Will proceed with inpatient EEG.

## 2023-05-03 NOTE — PATIENT PROFILE ADULT - FALL HARM RISK - RISK INTERVENTIONS
Assistance OOB with selected safe patient handling equipment/Assistance with ambulation/Communicate Fall Risk and Risk Factors to all staff, patient, and family/Monitor for mental status changes/Reinforce activity limits and safety measures with patient and family/Review medications for side effects contributing to fall risk/Use of alarms - bed, chair and/or voice tab/Visual Cue: Yellow wristband/Bed in lowest position, wheels locked, appropriate side rails in place/Call bell, personal items and telephone in reach/Instruct patient to call for assistance before getting out of bed or chair/Non-slip footwear when patient is out of bed/[place] to call system/Physically safe environment - no spills, clutter or unnecessary equipment/Purposeful Proactive Rounding/Room/bathroom lighting operational, light cord in reach

## 2023-05-03 NOTE — CONSULT NOTE ADULT - SUBJECTIVE AND OBJECTIVE BOX
Neurology Consultation     HPI: Patient ADELAIDE MENDOZA is a 76yo M PMHx      NIHSS:       PAST MEDICAL & SURGICAL HISTORY:  Parkinsons disease    FAMILY HISTORY:    MEDICATIONS   MEDICATIONS  (STANDING):    MEDICATIONS  (PRN):    ALLERGIES/INTOLERANCES:  Allergies  Allergy Status Unknown    Intolerances    VITALS & EXAMINATION:  Vital Signs Last 24 Hrs  T(C): 37.1 (03 May 2023 06:05), Max: 37.1 (03 May 2023 06:05)  T(F): 98.8 (03 May 2023 06:05), Max: 98.8 (03 May 2023 06:05)  HR: 68 (03 May 2023 06:05) (68 - 68)  BP: 123/60 (03 May 2023 06:05) (123/60 - 123/60)  BP(mean): --  RR: 18 (03 May 2023 06:05) (18 - 18)  SpO2: 100% (03 May 2023 06:05) (100% - 100%)    Parameters below as of 03 May 2023 06:05  Patient On (Oxygen Delivery Method): room air         LABORATORY:  CBC                       13.6   8.84  )-----------( 207      ( 03 May 2023 07:35 )             42.6     Chem 05-03    140  |  104  |  18  ----------------------------<  98  4.2   |  26  |  0.96    Ca    9.6      03 May 2023 07:35    TPro  7.2  /  Alb  4.5  /  TBili  0.5  /  DBili  x   /  AST  17  /  ALT  15  /  AlkPhos  68  05-03    LFTs LIVER FUNCTIONS - ( 03 May 2023 07:35 )  Alb: 4.5 g/dL / Pro: 7.2 g/dL / ALK PHOS: 68 U/L / ALT: 15 U/L / AST: 17 U/L / GGT: x           Coagulopathy PT/INR - ( 03 May 2023 07:35 )   PT: 12.0 sec;   INR: 1.03 ratio         PTT - ( 03 May 2023 07:35 )  PTT:31.0 sec  Lipid Panel   A1c   Cardiac enzymes     U/A   CSF  Other    STUDIES & IMAGING: (EEG, CT, MR, U/S, TTE/FREDY):    < from: MR Angio Neck No Cont (04.07.23 @ 11:15) >    EXAM: [id number] - MR ANGIO BRAIN  - ORDERED BY:  JOSE GREEN    EXAM: [id number] - MR ANGIO NECK  - ORDERED BY:  JOSE GREEN    EXAM: [id number] - MR BRAIN DEMENTIA W 3D#  - ORDERED BY:  JOSE GREEN      PROCEDURE DATE:  04/07/2023          INTERPRETATION:  Clinical indication: Parkinson's disease. Slurred speech.    MRI of the brain was performed using sagittal T1 axial T2 T2 FLAIR   diffusion and susceptibility weighted sequence. Neuro Quant was performed   as well.    MRA of the neck was performed using 2-D and 3-D time flight technique    MRA of the [place] performed using 3-D [place] technique    This exam is compared with prior brain MRI performed on September 11, 2022.    Parenchymal volume loss and chronic microvascular ischemic changes are   again identified.    Stable subcentimeter focus of T2 prolongation involving the right roshni is   identified. This could be compatible with stable area of ischemia. 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    There is no acute hemorrhage seen.    Evaluation of the diffusion weighted sequence demonstrates no abnormal   areas of restricted diffusion to suggest acute infarct.    Abnormal susceptibility involving the anterior right thalamic region is   identified which could be compatible area of old hemorrhage   mineralization or cavernous angioma. Smaller but similar area is seen   involving the left cerebellar region.    The large vessels demonstrate normal flow voids    Right frontal sinus mucosal thickening is seen.    Opacification of the right mastoid and middle ear region is again seen   and unchanged. Mild inflammatory change is seen involving the left   mastoid and middle earregions seen.    Neuro Quant data is available for review.    Both distal common carotid, proximal internal and external carotid   appears normal as well as both vertebral arteries. No significant   stenosis is seen.    Both distal internal carotid, anterior cerebral, middle cerebral, basilar   and posterior cerebral arteries appear normal without evidence of an   aneurysm or significant stenosis.    IMPRESSION: Abnormal areas of susceptibility involving the anterior right   thalamic and left cerebellar region. The rest of this MRI of the brain   appears unchanged when compared with the prior exam.    Unremarkable MRA of the neck and [place].    -- End of Report --- Neurology Consultation     HPI: Patient ADELAIDE MENDOZA is a 76yo M PMHx of PD on sinemet, prior colon CA w/ ostomy, prior prostate CA s/p radiation cystitis, blurry vision 2/2 cataracts baseline, who presents to ED for episodes of freezing, slurred speech. Patient followed by Dr Ortega (movement specialist). History from son and spouse bedside who reported that patient few weeks back had similar episode and since yesterday 5/2/23 has had multiple episodes with resolution of freezing, paucity of speech, slurred speech in between, confusion, not making sense (stating he is coming to the hospital to see eye doctor), inability to walk. Episodes still occurring while in hospital. Not on anticoagulant, antiplatelet agents given reported concern of hematuria from prior prostate issues.      NIHSS: 5     PAST MEDICAL & SURGICAL HISTORY:  Parkinsons disease    FAMILY HISTORY:    MEDICATIONS   MEDICATIONS  (STANDING):    MEDICATIONS  (PRN):    ALLERGIES/INTOLERANCES:  Allergies  Allergy Status Unknown    Intolerances    VITALS & EXAMINATION:  Vital Signs Last 24 Hrs  T(C): 37.1 (03 May 2023 06:05), Max: 37.1 (03 May 2023 06:05)  T(F): 98.8 (03 May 2023 06:05), Max: 98.8 (03 May 2023 06:05)  HR: 68 (03 May 2023 06:05) (68 - 68)  BP: 123/60 (03 May 2023 06:05) (123/60 - 123/60)  BP(mean): --  RR: 18 (03 May 2023 06:05) (18 - 18)  SpO2: 100% (03 May 2023 06:05) (100% - 100%)    Parameters below as of 03 May 2023 06:05  Patient On (Oxygen Delivery Method): room air     General:  Constitutional: Male, appears stated age, nontoxic, not in distress, good mood   Eyes: clear sclera;   Extremities: No cyanosis;   Resp: breathing comfortably     Neurological (>12):  MS: Awake, alert. Oriented person place not situation. Follows simple commands. Attends to examiner. Increased cognitive slowing.  Language: Speech is hypophonic, clear, freezing of speech intermittently, sometimes slurring but more freezing than slurring. Good repetition, comprehension (hard of hearing), registration of words.  CNs: PERRL (R 3mm, L 3mm). No APD. VFF. EOMI. No disconjugate gaze. V1-3 intact LT, No dennis facial asymmetry b/l.  Tongue midline.   Motor - Has mildly increased tone particularly LUE elbow. Possible mild spasticity there as well. Tone appears good w/o dennis rigidity in RUE and distal LUE (wrist). Has postural tremor LUE     L/R         Deltoid  4+/4+    Biceps   4+/4+      Triceps  4+/4+       4+/4+  L/R         Hip Flexion 4/4     Knee Extension  5/5  Dorsiflexion  4+/4+    Plantar Flexion 4+/4+     Sensation: Intact to LT b/l.  Reflexes L/R:  Biceps(C5) 3/3  BR(C6) 2/2   Triceps(C7)  2/2 Patellar(L4)   3/3 Ankle 2/2   No ankle clonus  Negative sims reflex  Negative pectoral reflex  Coordination: No dysmetria to FTN b/l UE  Gait: difficulty getting up, freezes. Has shuffling gait, pauses.     LABORATORY:  CBC                       13.6   8.84  )-----------( 207      ( 03 May 2023 07:35 )             42.6     Chem 05-03    140  |  104  |  18  ----------------------------<  98  4.2   |  26  |  0.96    Ca    9.6      03 May 2023 07:35    TPro  7.2  /  Alb  4.5  /  TBili  0.5  /  DBili  x   /  AST  17  /  ALT  15  /  AlkPhos  68  05-03    LFTs LIVER FUNCTIONS - ( 03 May 2023 07:35 )  Alb: 4.5 g/dL / Pro: 7.2 g/dL / ALK PHOS: 68 U/L / ALT: 15 U/L / AST: 17 U/L / GGT: x           Coagulopathy PT/INR - ( 03 May 2023 07:35 )   PT: 12.0 sec;   INR: 1.03 ratio      PTT - ( 03 May 2023 07:35 )  PTT:31.0 sec  Lipid Panel   A1c   Cardiac enzymes     U/A   CSF  Other    STUDIES & IMAGING: (EEG, CT, MR, U/S, TTE/FREDY):    < from: MR Angio Neck No Cont (04.07.23 @ 11:15) >    EXAM: [id number] - MR ANGIO BRAIN  - ORDERED BY:  JOSE ORTEGA    EXAM: [id number] - MR ANGIO NECK  - ORDERED BY:  JOSE ORTEGA    EXAM: [id number] - MR BRAIN DEMENTIA W 3D#  - ORDERED BY:  JOSE ORTEGA      PROCEDURE DATE:  04/07/2023          INTERPRETATION:  Clinical indication: Parkinson's disease. Slurred speech.    MRI of the brain was performed using sagittal T1 axial T2 T2 FLAIR   diffusion and susceptibility weighted sequence. Neuro Quant was performed   as well.    MRA of the neck was performed using 2-D and 3-D time flight technique    MRA of the [place] performed using 3-D [place] technique    This exam is compared with prior brain MRI performed on September 11, 2022.    Parenchymal volume loss and chronic microvascular ischemic changes are   again identified.    Stable subcentimeter focus of T2 prolongation involving the right roshni is   identified. This could be compatible with stable area of ischemia. 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    There is no acute hemorrhage seen.    Evaluation of the diffusion weighted sequence demonstrates no abnormal   areas of restricted diffusion to suggest acute infarct.    Abnormal susceptibility involving the anterior right thalamic region is   identified which could be compatible area of old hemorrhage   mineralization or cavernous angioma. Smaller but similar area is seen   involving the left cerebellar region.    The large vessels demonstrate normal flow voids    Right frontal sinus mucosal thickening is seen.    Opacification of the right mastoid and middle ear region is again seen   and unchanged. Mild inflammatory change is seen involving the left   mastoid and middle earregions seen.    Neuro Quant data is available for review.    Both distal common carotid, proximal internal and external carotid   appears normal as well as both vertebral arteries. No significant   stenosis is seen.    Both distal internal carotid, anterior cerebral, middle cerebral, basilar   and posterior cerebral arteries appear normal without evidence of an   aneurysm or significant stenosis.    IMPRESSION: Abnormal areas of susceptibility involving the anterior right   thalamic and left cerebellar region. The rest of this MRI of the brain   appears unchanged when compared with the prior exam.    Unremarkable MRA of the neck and [place].    -- End of Report --- Neurology Consultation     HPI: Patient ADELAIDE MENDOZA is a 78yo M PMHx of PD on sinemet, prior colon CA w/ ostomy, prior prostate CA s/p radiation cystitis, blurry vision 2/2 cataracts baseline, who presents to ED for episodes of freezing, slurred speech. Patient followed by Dr Ortega (movement specialist). History from son and spouse bedside who reported that patient few weeks back had similar episode and since yesterday 5/2/23 has had multiple episodes with resolution of freezing, paucity of speech, slurred speech in between, confusion, not making sense (stating he is coming to the hospital to see eye doctor), inability to walk. Episodes still occurring while in hospital. Not on anticoagulant, antiplatelet agents given reported concern of hematuria from prior prostate issues. At baseline, able to ambulate mostly without cane while at home and does not need to be supervised when walking but when outside will use cane. Since very recently his ambulation has worsened.      NIHSS: 5     PAST MEDICAL & SURGICAL HISTORY:  Parkinsons disease    FAMILY HISTORY:    MEDICATIONS   MEDICATIONS  (STANDING):    MEDICATIONS  (PRN):    ALLERGIES/INTOLERANCES:  Allergies  Allergy Status Unknown    Intolerances    VITALS & EXAMINATION:  Vital Signs Last 24 Hrs  T(C): 37.1 (03 May 2023 06:05), Max: 37.1 (03 May 2023 06:05)  T(F): 98.8 (03 May 2023 06:05), Max: 98.8 (03 May 2023 06:05)  HR: 68 (03 May 2023 06:05) (68 - 68)  BP: 123/60 (03 May 2023 06:05) (123/60 - 123/60)  BP(mean): --  RR: 18 (03 May 2023 06:05) (18 - 18)  SpO2: 100% (03 May 2023 06:05) (100% - 100%)    Parameters below as of 03 May 2023 06:05  Patient On (Oxygen Delivery Method): room air     General:  Constitutional: Male, appears stated age, nontoxic, not in distress, good mood   Eyes: clear sclera;   Extremities: No cyanosis;   Resp: breathing comfortably     Neurological (>12):  MS: Awake, alert. Oriented person place not situation. Follows simple commands. Attends to examiner. Increased cognitive slowing.  Language: Speech is hypophonic, clear, freezing of speech intermittently, sometimes slurring but more freezing than slurring. Good repetition, comprehension (hard of hearing), registration of words.  CNs: PERRL (R 3mm, L 3mm). No APD. VFF. EOMI. No disconjugate gaze. V1-3 intact LT, No dennis facial asymmetry b/l.  Tongue midline.   Motor - Has mildly increased tone particularly LUE elbow. Possible mild spasticity there as well. Tone appears good w/o dennis rigidity in RUE and distal LUE (wrist). Has postural tremor LUE. Also marked bradykinesia.     L/R         Deltoid  4+/4+    Biceps   4+/4+      Triceps  4+/4+       4+/4+  L/R         Hip Flexion 4/4     Knee Extension  5/5  Dorsiflexion  4+/4+    Plantar Flexion 4+/4+     Sensation: Intact to LT b/l.  Reflexes L/R:  Biceps(C5) 3/3  BR(C6) 2/2   Triceps(C7)  2/2 Patellar(L4)   3/3 Ankle 2/2   No ankle clonus  Negative sims reflex  Negative pectoral reflex  Coordination: No dysmetria to FTN b/l UE  Gait: difficulty getting up, freezes. Has shuffling gait, pauses.     LABORATORY:  CBC                       13.6   8.84  )-----------( 207      ( 03 May 2023 07:35 )             42.6     Chem 05-03    140  |  104  |  18  ----------------------------<  98  4.2   |  26  |  0.96    Ca    9.6      03 May 2023 07:35    TPro  7.2  /  Alb  4.5  /  TBili  0.5  /  DBili  x   /  AST  17  /  ALT  15  /  AlkPhos  68  05-03    LFTs LIVER FUNCTIONS - ( 03 May 2023 07:35 )  Alb: 4.5 g/dL / Pro: 7.2 g/dL / ALK PHOS: 68 U/L / ALT: 15 U/L / AST: 17 U/L / GGT: x           Coagulopathy PT/INR - ( 03 May 2023 07:35 )   PT: 12.0 sec;   INR: 1.03 ratio      PTT - ( 03 May 2023 07:35 )  PTT:31.0 sec  Lipid Panel   A1c   Cardiac enzymes     U/A   CSF  Other    STUDIES & IMAGING: (EEG, CT, MR, U/S, TTE/FREDY):    < from: MR Angio Neck No Cont (04.07.23 @ 11:15) >    EXAM: [id number] - MR ANGIO BRAIN  - ORDERED BY:  JOSE ORTEGA    EXAM: [id number] - MR ANGIO NECK  - ORDERED BY:  JOSE ORTEGA    EXAM: [id number] - MR BRAIN DEMENTIA W 3D#  - ORDERED BY:  JOSE ORTEGA      PROCEDURE DATE:  04/07/2023          INTERPRETATION:  Clinical indication: Parkinson's disease. Slurred speech.    MRI of the brain was performed using sagittal T1 axial T2 T2 FLAIR   diffusion and susceptibility weighted sequence. Neuro Quant was performed   as well.    MRA of the neck was performed using 2-D and 3-D time flight technique    MRA of the [place] performed using 3-D [place] technique    This exam is compared with prior brain MRI performed on September 11, 2022.    Parenchymal volume loss and chronic microvascular ischemic changes are   again identified.    Stable subcentimeter focus of T2 prolongation involving the right roshni is   identified. This could be compatible with stable area of ischemia. 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    There is no acute hemorrhage seen.    Evaluation of the diffusion weighted sequence demonstrates no abnormal   areas of restricted diffusion to suggest acute infarct.    Abnormal susceptibility involving the anterior right thalamic region is   identified which could be compatible area of old hemorrhage   mineralization or cavernous angioma. Smaller but similar area is seen   involving the left cerebellar region.    The large vessels demonstrate normal flow voids    Right frontal sinus mucosal thickening is seen.    Opacification of the right mastoid and middle ear region is again seen   and unchanged. Mild inflammatory change is seen involving the left   mastoid and middle earregions seen.    Neuro Quant data is available for review.    Both distal common carotid, proximal internal and external carotid   appears normal as well as both vertebral arteries. No significant   stenosis is seen.    Both distal internal carotid, anterior cerebral, middle cerebral, basilar   and posterior cerebral arteries appear normal without evidence of an   aneurysm or significant stenosis.    IMPRESSION: Abnormal areas of susceptibility involving the anterior right   thalamic and left cerebellar region. The rest of this MRI of the brain   appears unchanged when compared with the prior exam.    Unremarkable MRA of the neck and [place].    -- End of Report ---

## 2023-05-03 NOTE — H&P ADULT - PROBLEM SELECTOR PLAN 1
Patient with episodes of slurred speech lasting 10-20 mins   Neurology consulted   UA negative; CXRay clear unlikely infectious etiology     Plan:   >ctm on telemetry for r/o afib (conversion in and out of afib can result in strokes)   >f/u TTE to r/o PFO  >f/u vEEG   >f/u ammonia level, folate, B12, vitamin D to rule out metabolic causes  >f/u RVP  >f/u neuro recs

## 2023-05-03 NOTE — H&P ADULT - HISTORY OF PRESENT ILLNESS
76yo M PMHx of PD on sinemet, prior colon CA w/ ostomy, prior prostate CA s/p radiation cystitis, blurry vision 2/2 cataracts baseline, who presents to ED for episodes of freezing, slurred speech.  History from son and spouse bedside who reported that patient few weeks back had similar episode and since yesterday 5/2/23 has had multiple episodes with resolution of freezing, paucity of speech, slurred speech in between, confusion, not making sense (stating he is coming to the hospital to see eye doctor), inability to walk. Episodes still occurring while in hospital. Not on anticoagulant, antiplatelet agents given reported concern of hematuria from prior prostate issues. At baseline, able to ambulate mostly without cane while at home and does not need to be supervised when walking but when outside will use cane. Since very recently his ambulation has worsened.    76yo M PMHx of PD on sinemet, prior colon CA w/ ostomy, prior prostate CA s/p radiation cystitis, blurry vision 2/2 cataracts baseline, who presents to ED for episodes of freezing, slurred speech.  Per family and patient, these episodes have been happening over the past 6 weeks and have been progressively worsening and increasing in length. During these episodes, patient develops full body weakness, inability to articulate, and confusion but maintains some level of alertness, never loses consciousness. Denies loss of bladder function, tongue biting.     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     In the ED, neurology consulted, reccomending further workup for seizure. Code Stroke called in the ED, neurology responded and stated unlikely stroke.  76yo M PMHx of Parkinson's Disease on sinemet, prior colon CA w/ ostomy, prior prostate CA s/p radiation cystitis, blurry vision 2/2 cataracts baseline, who presents to ED for episodes of freezing, slurred speech.  Per family and patient, these episodes have been happening over the past 6 weeks and have been progressively worsening and increasing in length. During these episodes, patient develops full body weakness, inability to articulate, and confusion but maintains some level of alertness, never loses consciousness. Denies loss of bladder function, tongue biting. Denies confusion after these episodes occur. Denies any aura including smell/taste prior to episodes. Also denies headaches, sensitivity to light/sound. Denies medication changes, taking any OTC medications. Denies any recent illnesses.     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     In the ED, neurology consulted, recommending further workup for seizure. Code Stroke called in the ED, neurology responded and stated unlikely stroke.

## 2023-05-03 NOTE — H&P ADULT - ASSESSMENT
76 y/o M PMH Parkinson's Disease, Prior Strokes p/w episodes of slurred speech concerning for progression of parkinson's disease vs seizures vs toxic metabolic syndrome.

## 2023-05-03 NOTE — H&P ADULT - PROBLEM SELECTOR PLAN 2
Patient with history of stroke not on aspirin for concern for prior hematuria    Plan:   >start patent on aspirin 81 and atorva 40   >monitor for signs of bleeding

## 2023-05-03 NOTE — ED PROVIDER NOTE - PROGRESS NOTE DETAILS
Cardiologist: Dr. Salvatore Pavon    Last appt: 3/18/2020  Future Appointments   Date Time Provider Samantha Ley   7/20/2020  9:40 AM Addie Jimenez  [address]   8/18/2020 11:00 AM Earnestine Bonds MD [id number]   10/19/2020  9:20 AM Addie Jimenez  [address]       Requested Prescriptions     Signed Prescriptions Disp Refills    clopidogreL (PLAVIX) 75 mg tab 90 Tab 3     Sig: Take 1 Tab by mouth daily.      Authorizing Provider: [place] Nova     Ordering User: Ede Walker         Refills VO per Dr. Salvatore Pavon. NIH=0. No stroke code. Patient declines CT.  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  Reportedly his neurologist told him he is never to get any sort of CT imaging as it will induce the hemorrhagic cystitis.  I met with patient and his wife and advised that  a CT of the brain would not cause hemorrhagic cystitis.  I offered to have patient wear lead over his groin during the CT.   despite intervention and education patient and spouse continue to refuse CT imaging.  Patient currently is asymptomatic and at baseline neurological status and therefore patient does not need emergent CT imaging.  However explained to patient if he has acute stroke symptoms the only way to save his life and prevent permanent death or disability would be to get a CT scan of his head as that is  the most time efficient way to obtain stroke treatment.  Patient and spouse understand the risk of missing stroke window treatment if they are not amenable to CT scan and have capacity.  They prefer to suffer the consequences of an untreated stroke over potentially getting hemorrhagic cystitis from the CT scan. SHANT. 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  See attending Faby's progress note above.  Patient is a 77-year-old male history of Parkinson's, prostate CA status postradiation with radiation cystitis, presenting to ED with dysarthria/slurred speech intermittently starting 24 hours ago, last episode being around 4:30 AM this morning in which patient was brought in to ER.  Family states that patient was seen for similar symptoms outpatient, as per chart review 4/7 had MRI/MRI imaging which was essentially unremarkable.  Labs within normal limits, case discussed with neurology, will evaluate. Patient declines CT.  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  Reportedly, his neurologist told him he should never undergo CT imaging as it will induce the hemorrhagic cystitis.  I met with patient and his wife and advised that a CT of the brain would not cause hemorrhagic cystitis.  I offered to have patient wear lead over his groin during the CT.  I offered to discuss with patient's neurologist. Despite intervention and education, patient and spouse remain adamant about no CT imaging.  Patient currently is asymptomatic and at baseline neurological status and therefore does not need emergent CT imaging to rule out acute stroke.  However, pt has intermittent symptoms c/w TIA worsening in severity over past 24 hrs, which puts him at increased risk of progressing to CVA. Explained to patient if he has acute stroke symptoms the best way to save his life and prevent permanent death or disability would be CT imaging, as this is currently the most time efficient way to obtain stroke treatment.  Patient and spouse understand the risk of missing stroke window treatment if they are not amenable to CT scan and have capacity to refuse diagnostic test being offered. They verbalize understanding of the consequences of delayed stroke treatment. SHANT. MABEL BRUMFIELD: called to bedside by family for another episode of aphasia, which is c/w events pt has had over past 24 hours. pt with noticeable dysarthria, appears "frozen" in place as per family, standing above bed with blank stare. answering questions appropriately, moving all extremities, no focal neuro deficits. neuro reconsulted, will evaluate immediately. CT imaging was once again offered to wife/son at bedside due to dysarthria and potential stroke like etiology, and continue to defer CT's until neurology evaluation.

## 2023-05-03 NOTE — ED ADULT NURSE NOTE - OBJECTIVE STATEMENT
Patient with pmh Parkinson's, abdominal colon? CA with colostomy c/o slurred speech and difficulty ambulating that was noted by spouse evening prior. States symptoms got progressively worse then fully resolved. Note walking and speech back at baseline. AOX3. Refusing CT scan. Denies pain. No acute distress noted.

## 2023-05-03 NOTE — ED PROVIDER NOTE - DIFFERENTIAL DIAGNOSIS
Differential Diagnosis TIA, parkinson d/o, metabolic/infectious syndrome TIA, CVA, parkinson d/o, metabolic/infectious syndrome

## 2023-05-03 NOTE — H&P ADULT - NSHPPHYSICALEXAM_GEN_ALL_CORE
T(C): 36.7 (05-03-23 @ 13:59), Max: 37.1 (05-03-23 @ 06:05)  HR: 81 (05-03-23 @ 16:02) (68 - 89)  BP: 150/75 (05-03-23 @ 16:02) (85/57 - 150/75)  RR: 18 (05-03-23 @ 16:02) (18 - 18)  SpO2: 97% (05-03-23 @ 16:02) (97% - 100%)    GENERAL: patient appears well, no acute distress, appropriate, pleasant  EYES: sclera clear, no exudates  ENMT: oropharynx clear without erythema, no exudates, moist mucous membranes  NECK: supple, soft, no thyromegaly noted  LUNGS: good air entry bilaterally, clear to auscultation, symmetric breath sounds, no wheezing or rhonchi appreciated  HEART: soft S1/S2, regular rate and rhythm, no murmurs noted, no lower extremity edema  GASTROINTESTINAL: abdomen is soft, nontender, nondistended, normoactive bowel sounds, no palpable masses; Ostomy bag with adequate output  INTEGUMENT: good skin turgor, no lesions noted  MUSCULOSKELETAL: Cogwheel rigidity noted, 5/5 strength in all extremities; 3/5 reflexes in upper and lower extremities.   NEUROLOGIC: awake, alert, oriented x3, good muscle tone in 4 extremities, no obvious sensory deficits  PSYCHIATRIC: mood is good, affect is congruent, linear and logical thought process  HEME/LYMPH: no palpable supraclavicular nodules, no obvious ecchymosis or petechiae

## 2023-05-04 LAB
24R-OH-CALCIDIOL SERPL-MCNC: 13.7 NG/ML — LOW (ref 30–80)
AMMONIA BLD-MCNC: 22 UMOL/L — SIGNIFICANT CHANGE UP (ref 11–55)
ANION GAP SERPL CALC-SCNC: 11 MMOL/L — SIGNIFICANT CHANGE UP (ref 7–14)
B PERT DNA SPEC QL NAA+PROBE: SIGNIFICANT CHANGE UP
B PERT+PARAPERT DNA PNL SPEC NAA+PROBE: SIGNIFICANT CHANGE UP
BORDETELLA PARAPERTUSSIS (RAPRVP): SIGNIFICANT CHANGE UP
BUN SERPL-MCNC: 14 MG/DL — SIGNIFICANT CHANGE UP (ref 7–23)
C PNEUM DNA SPEC QL NAA+PROBE: SIGNIFICANT CHANGE UP
CALCIUM SERPL-MCNC: 9.1 MG/DL — SIGNIFICANT CHANGE UP (ref 8.4–10.5)
CHLORIDE SERPL-SCNC: 107 MMOL/L — SIGNIFICANT CHANGE UP (ref 98–107)
CO2 SERPL-SCNC: 24 MMOL/L — SIGNIFICANT CHANGE UP (ref 22–31)
CREAT SERPL-MCNC: 0.81 MG/DL — SIGNIFICANT CHANGE UP (ref 0.5–1.3)
CULTURE RESULTS: SIGNIFICANT CHANGE UP
EGFR: 90 ML/MIN/1.73M2 — SIGNIFICANT CHANGE UP
FLUAV SUBTYP SPEC NAA+PROBE: SIGNIFICANT CHANGE UP
FLUBV RNA SPEC QL NAA+PROBE: SIGNIFICANT CHANGE UP
GLUCOSE SERPL-MCNC: 99 MG/DL — SIGNIFICANT CHANGE UP (ref 70–99)
HADV DNA SPEC QL NAA+PROBE: SIGNIFICANT CHANGE UP
HCOV 229E RNA SPEC QL NAA+PROBE: SIGNIFICANT CHANGE UP
HCOV HKU1 RNA SPEC QL NAA+PROBE: SIGNIFICANT CHANGE UP
HCOV NL63 RNA SPEC QL NAA+PROBE: SIGNIFICANT CHANGE UP
HCOV OC43 RNA SPEC QL NAA+PROBE: SIGNIFICANT CHANGE UP
HCT VFR BLD CALC: 41 % — SIGNIFICANT CHANGE UP (ref 39–50)
HGB BLD-MCNC: 12.9 G/DL — LOW (ref 13–17)
HMPV RNA SPEC QL NAA+PROBE: SIGNIFICANT CHANGE UP
HPIV1 RNA SPEC QL NAA+PROBE: SIGNIFICANT CHANGE UP
HPIV2 RNA SPEC QL NAA+PROBE: SIGNIFICANT CHANGE UP
HPIV3 RNA SPEC QL NAA+PROBE: SIGNIFICANT CHANGE UP
HPIV4 RNA SPEC QL NAA+PROBE: SIGNIFICANT CHANGE UP
M PNEUMO DNA SPEC QL NAA+PROBE: SIGNIFICANT CHANGE UP
MCHC RBC-ENTMCNC: 27.7 PG — SIGNIFICANT CHANGE UP (ref 27–34)
MCHC RBC-ENTMCNC: 31.5 GM/DL — LOW (ref 32–36)
MCV RBC AUTO: 88.2 FL — SIGNIFICANT CHANGE UP (ref 80–100)
NRBC # BLD: 0 /100 WBCS — SIGNIFICANT CHANGE UP (ref 0–0)
NRBC # FLD: 0 K/UL — SIGNIFICANT CHANGE UP (ref 0–0)
PLATELET # BLD AUTO: 197 K/UL — SIGNIFICANT CHANGE UP (ref 150–400)
POTASSIUM SERPL-MCNC: 3.9 MMOL/L — SIGNIFICANT CHANGE UP (ref 3.5–5.3)
POTASSIUM SERPL-SCNC: 3.9 MMOL/L — SIGNIFICANT CHANGE UP (ref 3.5–5.3)
RAPID RVP RESULT: SIGNIFICANT CHANGE UP
RBC # BLD: 4.65 M/UL — SIGNIFICANT CHANGE UP (ref 4.2–5.8)
RBC # FLD: 14 % — SIGNIFICANT CHANGE UP (ref 10.3–14.5)
RSV RNA SPEC QL NAA+PROBE: SIGNIFICANT CHANGE UP
RV+EV RNA SPEC QL NAA+PROBE: SIGNIFICANT CHANGE UP
SARS-COV-2 RNA SPEC QL NAA+PROBE: SIGNIFICANT CHANGE UP
SODIUM SERPL-SCNC: 142 MMOL/L — SIGNIFICANT CHANGE UP (ref 135–145)
SPECIMEN SOURCE: SIGNIFICANT CHANGE UP
TSH SERPL-MCNC: 1.08 UIU/ML — SIGNIFICANT CHANGE UP (ref 0.27–4.2)
WBC # BLD: 9.02 K/UL — SIGNIFICANT CHANGE UP (ref 3.8–10.5)
WBC # FLD AUTO: 9.02 K/UL — SIGNIFICANT CHANGE UP (ref 3.8–10.5)

## 2023-05-04 PROCEDURE — 95720 EEG PHY/QHP EA INCR W/VEEG: CPT

## 2023-05-04 PROCEDURE — 99232 SBSQ HOSP IP/OBS MODERATE 35: CPT | Mod: GC

## 2023-05-04 PROCEDURE — 99233 SBSQ HOSP IP/OBS HIGH 50: CPT | Mod: GC

## 2023-05-04 RX ORDER — ERGOCALCIFEROL 1.25 MG/1
50000 CAPSULE ORAL ONCE
Refills: 0 | Status: COMPLETED | OUTPATIENT
Start: 2023-05-04 | End: 2023-05-05

## 2023-05-04 RX ORDER — CHOLECALCIFEROL (VITAMIN D3) 125 MCG
1000 CAPSULE ORAL DAILY
Refills: 0 | Status: DISCONTINUED | OUTPATIENT
Start: 2023-05-04 | End: 2023-05-04

## 2023-05-04 RX ORDER — ASPIRIN/CALCIUM CARB/MAGNESIUM 324 MG
81 TABLET ORAL DAILY
Refills: 0 | Status: DISCONTINUED | OUTPATIENT
Start: 2023-05-04 | End: 2023-05-05

## 2023-05-04 RX ORDER — CHOLECALCIFEROL (VITAMIN D3) 125 MCG
50000 CAPSULE ORAL ONCE
Refills: 0 | Status: DISCONTINUED | OUTPATIENT
Start: 2023-05-04 | End: 2023-05-04

## 2023-05-04 RX ORDER — ATORVASTATIN CALCIUM 80 MG/1
40 TABLET, FILM COATED ORAL AT BEDTIME
Refills: 0 | Status: DISCONTINUED | OUTPATIENT
Start: 2023-05-04 | End: 2023-05-05

## 2023-05-04 RX ADMIN — CARBIDOPA AND LEVODOPA 1 TABLET(S): 25; 100 TABLET ORAL at 05:32

## 2023-05-04 RX ADMIN — ATORVASTATIN CALCIUM 40 MILLIGRAM(S): 80 TABLET, FILM COATED ORAL at 21:09

## 2023-05-04 RX ADMIN — CARBIDOPA AND LEVODOPA 1 TABLET(S): 25; 100 TABLET ORAL at 21:09

## 2023-05-04 RX ADMIN — CARBIDOPA AND LEVODOPA 1 TABLET(S): 25; 100 TABLET ORAL at 12:48

## 2023-05-04 NOTE — PHYSICAL THERAPY INITIAL EVALUATION ADULT - ADDITIONAL COMMENTS
As per EMR, pt lives with spouse in an apartment/condo +elevator. Prior to admission, pt was ambulating with a cane independently. Pt owns a cane, rolling walker and wheelchair.   Post PT evaluation, pt left seated at edge of bed, alarm on, call bell and remote within reach, all precautions maintained, NAD. RN aware.

## 2023-05-04 NOTE — PROGRESS NOTE ADULT - PROBLEM SELECTOR PLAN 5
DVT: Lovenox  Diet: Regular (passed dysphagia screen)  Dispo: Pending PT eval   Ethics: Per son, wife has patient's ADV Directive and HCP forms; wife to bring in forms DVT: SCDs, patient refusing lovenox   Diet: Regular (passed dysphagia screen)  Dispo: Pending PT eval   Ethics: Full Code, Wife HCP

## 2023-05-04 NOTE — PHYSICAL THERAPY INITIAL EVALUATION ADULT - PERTINENT HX OF CURRENT PROBLEM, REHAB EVAL
78yo M PMHx of Parkinson's Disease on sinemet, prior colon CA w/ ostomy, prior prostate CA s/p radiation cystitis, blurry vision 2/2 cataracts baseline, who presents to ED for episodes of freezing, slurred speech.  Per family and patient, these episodes have been happening over the past 6 weeks and have been progressively worsening and increasing in length. During these episodes, patient develops full body weakness, inability to articulate, and confusion but maintains some level of alertness, never loses consciousness. Denies loss of bladder function, tongue biting. Denies confusion after these episodes occur. Denies any aura including smell/taste prior to episodes. Also denies headaches, sensitivity to light/sound. Denies medication changes, taking any OTC medications. Denies any recent illnesses.     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     In the ED, neurology consulted, recommending further workup for seizure. Code Stroke called in the ED, neurology responded and stated unlikely stroke.

## 2023-05-04 NOTE — PROGRESS NOTE ADULT - ATTENDING COMMENTS
77M with hx of Parkinson's Disease, Prior CVA p/w episodes of slurred speech/slowing movements concerning for progression of parkinson's disease vs seizures vs toxic metabolic syndrome.    Patient seen and examined, wife at bedside. Reports that he has not had any further episodes since the one in the ER. vEEG placed this AM.     #Episodes of altered level of consciousness  - Unclear etiology, seizures vs. TIA vs. progression of PD  - Neuro following and recs appreciated   - Hold off CT head as patient/family declining   - Monitor on tele, check TTE  - vEEG in place, will follow-up   - Start on ASA/statin for old lacunar infarcts on MRI   - Infectious work-up negative    Rest of plan as above  Discussed w/ patient and wife at bedside   Discussed w/ HS Dr. Edgar

## 2023-05-04 NOTE — PROGRESS NOTE ADULT - ATTENDING COMMENTS
Patient was seen and examined at bedside. During follow up visit, patient wife was present. Patient was connected to EEG. No focal deficit but consistent with bradykinesia, hypophonic speech, mildly increase the tone, cogwheel rigidity and mild postural tremors.   Mr. Rodriguez is a 77 year old right handed man with PMHx of Parkinson's disease on sinemet 25/100 mg TID, other medical problems who was brought to [facility] ER due to the episodes of freezing, slurred speech and confusion. This morning patient was connected to VEEG and will follow up the EEG result. Will continue the EEG monitoring to capture the event. Continue Sinemet 25/100 mg TID.   If you have any further questions, please do not hesitate to contact our team.  Thank you for allowing us to participate in this patient care.

## 2023-05-04 NOTE — PROGRESS NOTE ADULT - SUBJECTIVE AND OBJECTIVE BOX
INTERVAL HPI/OVERNIGHT EVENTS:    SUBJECTIVE: Patient seen and examined at bedside.    OBJECTIVE:    VITAL SIGNS:  ICU Vital Signs Last 24 Hrs  T(C): 36.4 (04 May 2023 04:42), Max: 37 (04 May 2023 01:25)  T(F): 97.6 (04 May 2023 04:42), Max: 98.6 (04 May 2023 01:25)  HR: 63 (04 May 2023 04:42) (61 - 89)  BP: 152/70 (04 May 2023 04:42) (85/57 - 167/75)  BP(mean): 90 (03 May 2023 19:28) (90 - 90)  ABP: --  ABP(mean): --  RR: 18 (04 May 2023 04:42) (16 - 18)  SpO2: 99% (04 May 2023 04:42) (97% - 100%)    O2 Parameters below as of 04 May 2023 04:42  Patient On (Oxygen Delivery Method): room air              -03 @ 07:01  -  - @ 07:00  --------------------------------------------------------  IN: 0 mL / OUT: 100 mL / NET: -100 mL      CAPILLARY BLOOD GLUCOSE      POCT Blood Glucose.: 132 mg/dL (03 May 2023 14:45)      PHYSICAL EXAM:    General: NAD  HEENT: NC/AT; PERRL, clear conjunctiva  Neck: supple  Respiratory: CTA b/l  Cardiovascular: +S1/S2; RRR  Abdomen: soft, NT/ND; +BS x4  Extremities:  no LE edema  Vascular: WWP, 2+ peripheral pulses b/l;  Skin: normal color and turgor; no rash  Neurological: A&Ox3, move all extremities. CN II-XII intact    MEDICATIONS:  MEDICATIONS  (STANDING):  carbidopa/levodopa  25/100 1 Tablet(s) Oral three times a day  enoxaparin Injectable 40 milliGRAM(s) SubCutaneous every 24 hours  Pentosan Polysulfate Sodium 100mg Cap 1 Capsule(s)   Oral daily    MEDICATIONS  (PRN):      ALLERGIES:  Allergies    Allergy Status Unknown    Intolerances        LABS:                        12.9   9.02  )-----------( 197      ( 04 May 2023 04:00 )             41.0     05    142  |  107  |  14  ----------------------------<  99  3.9   |  24  |  0.81    Ca    9.1      04 May 2023 04:00    TPro  7.2  /  Alb  4.5  /  TBili  0.5  /  DBili  x   /  AST  17  /  ALT  15  /  AlkPhos  68  05-03    PT/INR - ( 03 May 2023 07:35 )   PT: 12.0 sec;   INR: 1.03 ratio         PTT - ( 03 May 2023 07:35 )  PTT:31.0 sec  Urinalysis Basic - ( 03 May 2023 10:22 )    Color: Yellow / Appearance: Slightly Turbid / S.019 / pH: x  Gluc: x / Ketone: Negative  / Bili: Negative / Urobili: <2 mg/dL   Blood: x / Protein: Trace / Nitrite: Negative   Leuk Esterase: Negative / RBC: 2 /HPF / WBC 2 /HPF   Sq Epi: x / Non Sq Epi: x / Bacteria: Negative        RADIOLOGY & ADDITIONAL TESTS: Reviewed. INTERVAL HPI/OVERNIGHT EVENTS: No episodes of slurred speech/confusion overnight.     SUBJECTIVE: Patient seen and examined at bedside. Patient states he is feeling well with no complaints, denies weakness.     OBJECTIVE:    VITAL SIGNS:  ICU Vital Signs Last 24 Hrs  T(C): 36.4 (04 May 2023 04:42), Max: 37 (04 May 2023 01:25)  T(F): 97.6 (04 May 2023 04:42), Max: 98.6 (04 May 2023 01:25)  HR: 63 (04 May 2023 04:42) (61 - 89)  BP: 152/70 (04 May 2023 04:42) (85/57 - 167/75)  BP(mean): 90 (03 May 2023 19:28) (90 - 90)  ABP: --  ABP(mean): --  RR: 18 (04 May 2023 04:42) (16 - 18)  SpO2: 99% (04 May 2023 04:42) (97% - 100%)    O2 Parameters below as of 04 May 2023 04:42  Patient On (Oxygen Delivery Method): room air              05-03 @ 07:01  -  05-04 @ 07:00  --------------------------------------------------------  IN: 0 mL / OUT: 100 mL / NET: -100 mL      CAPILLARY BLOOD GLUCOSE      POCT Blood Glucose.: 132 mg/dL (03 May 2023 14:45)      PHYSICAL EXAM:    General: NAD  HEENT: NC/AT; PERRL, clear conjunctiva  Neck: supple  Respiratory: CTA b/l  Cardiovascular: +S1/S2; RRR  Abdomen: soft, NT/ND; +BS x4  Extremities:  no LE edema  Vascular: WWP, 2+ peripheral pulses b/l;  Skin: normal color and turgor; no rash  Neurological: A&Ox3, move all extremities. CN II-XII intact.Cog wheel rigidity noted     MEDICATIONS:  MEDICATIONS  (STANDING):  carbidopa/levodopa  25/100 1 Tablet(s) Oral three times a day  enoxaparin Injectable 40 milliGRAM(s) SubCutaneous every 24 hours  Pentosan Polysulfate Sodium 100mg Cap 1 Capsule(s)   Oral daily    MEDICATIONS  (PRN):      ALLERGIES:  Allergies    Allergy Status Unknown    Intolerances        LABS:                        12.9   9.02  )-----------( 197      ( 04 May 2023 04:00 )             41.0     05-04    142  |  107  |  14  ----------------------------<  99  3.9   |  24  |  0.81    Ca    9.1      04 May 2023 04:00    TPro  7.2  /  Alb  4.5  /  TBili  0.5  /  DBili  x   /  AST  17  /  ALT  15  /  AlkPhos  68  05-03    PT/INR - ( 03 May 2023 07:35 )   PT: 12.0 sec;   INR: 1.03 ratio         PTT - ( 03 May 2023 07:35 )  PTT:31.0 sec  Urinalysis Basic - ( 03 May 2023 10:22 )    Color: Yellow / Appearance: Slightly Turbid / S.019 / pH: x  Gluc: x / Ketone: Negative  / Bili: Negative / Urobili: <2 mg/dL   Blood: x / Protein: Trace / Nitrite: Negative   Leuk Esterase: Negative / RBC: 2 /HPF / WBC 2 /HPF   Sq Epi: x / Non Sq Epi: x / Bacteria: Negative        RADIOLOGY & ADDITIONAL TESTS: Reviewed.

## 2023-05-04 NOTE — PROGRESS NOTE ADULT - PROBLEM SELECTOR PLAN 1
Patient with episodes of slurred speech lasting 10-20 mins   Neurology consulted   UA negative; CXRay clear unlikely infectious etiology     Plan:   >ctm on telemetry for r/o afib (conversion in and out of afib can result in strokes)   >f/u TTE to r/o PFO  >f/u vEEG   >f/u ammonia level, folate, B12, vitamin D to rule out metabolic causes  >f/u RVP  >f/u neuro recs Patient with episodes of slurred speech lasting 10-20 mins   Neurology consulted   UA negative; CXRay clear unlikely infectious etiology   Vitamin D level low - started on maintainance D3 (1000 units QD)   Ammonia, TSH, B12 wnl    Plan:   >ctm on telemetry for r/o afib (conversion in and out of afib can result in strokes)   >f/u TTE to r/o PFO  >f/u vEEG   >f/u neuro recs

## 2023-05-04 NOTE — PHYSICAL THERAPY INITIAL EVALUATION ADULT - GENERAL OBSERVATIONS, REHAB EVAL
Pt received seated at edge of bed, with all lines intact, NAD, all precautions maintained. Wife by bedside

## 2023-05-04 NOTE — PROGRESS NOTE ADULT - SUBJECTIVE AND OBJECTIVE BOX
Neurology Progress Note    SUBJECTIVE/OBJECTIVE/INTERVAL EVENTS: Patient seen and examined at bedside w/ neuro attending. Patient and spouse report no new episodes since being connected to EEG in early AM. No new neurologic symptoms reported.     MEDICATIONS  (STANDING):  aspirin enteric coated 81 milliGRAM(s) Oral daily  atorvastatin 40 milliGRAM(s) Oral at bedtime  carbidopa/levodopa  25/100 1 Tablet(s) Oral three times a day  cholecalciferol 26563 Unit(s) Oral once  enoxaparin Injectable 40 milliGRAM(s) SubCutaneous every 24 hours  Pentosan Polysulfate Sodium 100mg Cap 1 Capsule(s)   Oral daily    MEDICATIONS  (PRN):      VITALS & EXAMINATION:  Vital Signs Last 24 Hrs  T(C): 36.8 (04 May 2023 08:52), Max: 37 (04 May 2023 01:25)  T(F): 98.3 (04 May 2023 08:52), Max: 98.6 (04 May 2023 01:25)  HR: 78 (04 May 2023 08:52) (61 - 89)  BP: 116/59 (04 May 2023 08:52) (85/57 - 167/75)  BP(mean): 90 (03 May 2023 19:28) (90 - 90)  RR: 18 (04 May 2023 08:52) (16 - 18)  SpO2: 99% (04 May 2023 08:52) (97% - 100%)    Parameters below as of 04 May 2023 08:52  Patient On (Oxygen Delivery Method): room air     General:  Constitutional: Male, appears stated age, nontoxic, not in distress, good mood   Eyes: clear sclera;   Extremities: No cyanosis;   Resp: breathing comfortably     Neurological (>12):  MS: Awake, alert. Oriented person place not situation. Follows simple commands. Attends to examiner.   Language: Speech is hypophonic, clear, no slurring. Good repetition, comprehension (hard of hearing), registration of words.  CNs: PERRL (R 3mm, L 3mm). No APD. VFF. EOMI. No disconjugate gaze. V1-3 intact LT, No dennis facial asymmetry b/l.  Tongue midline.   Motor - Has mildly increased tone particularly LUE elbow. Possible mild spasticity there as well. Tone appears good w/o dennis rigidity in RUE and distal LUE (wrist). Has postural tremor. Also marked bradykinesia.     L/R         Deltoid  4+/4+    Biceps   4+/4+      Triceps  4+/4+       4+/4+  L/R         Hip Flexion 4/4     Knee Extension  5/5  Dorsiflexion  4+/4+    Plantar Flexion 4+/4+     Sensation: Intact to LT b/l.  Reflexes L/R:  Biceps(C5) 3/3  BR(C6) 2/2   Triceps(C7)  2/2 Patellar(L4)   3/3 Ankle 2/2   No ankle clonus  Negative sims reflex  Negative pectoral reflex  Coordination: No dysmetria to FTN b/l UE  Gait: difficulty getting up, freezes. Has shuffling gait, pauses.      LABORATORY:  CBC                       12.9   9.02  )-----------( 197      ( 04 May 2023 04:00 )             41.0     Chem 05-04    142  |  107  |  14  ----------------------------<  99  3.9   |  24  |  0.81    Ca    9.1      04 May 2023 04:00    TPro  7.2  /  Alb  4.5  /  TBili  0.5  /  DBili  x   /  AST  17  /  ALT  15  /  AlkPhos  68  05-03    LFTs LIVER FUNCTIONS - ( 03 May 2023 07:35 )  Alb: 4.5 g/dL / Pro: 7.2 g/dL / ALK PHOS: 68 U/L / ALT: 15 U/L / AST: 17 U/L / GGT: x           Coagulopathy PT/INR - ( 03 May 2023 07:35 )   PT: 12.0 sec;   INR: 1.03 ratio         PTT - ( 03 May 2023 07:35 )  PTT:31.0 sec  Lipid Panel   A1c   Cardiac enzymes     U/A Urinalysis Basic - ( 03 May 2023 10:22 )    Color: Yellow / Appearance: Slightly Turbid / S.019 / pH: x  Gluc: x / Ketone: Negative  / Bili: Negative / Urobili: <2 mg/dL   Blood: x / Protein: Trace / Nitrite: Negative   Leuk Esterase: Negative / RBC: 2 /HPF / WBC 2 /HPF   Sq Epi: x / Non Sq Epi: x / Bacteria: Negative      CSF  Immunological  Other    STUDIES & IMAGING: (EEG, CT, MR, U/S, TTE/FREDY):

## 2023-05-05 ENCOUNTER — TRANSCRIPTION ENCOUNTER (OUTPATIENT)
Age: 78
End: 2023-05-05

## 2023-05-05 VITALS
HEART RATE: 63 BPM | OXYGEN SATURATION: 99 % | SYSTOLIC BLOOD PRESSURE: 135 MMHG | TEMPERATURE: 98 F | DIASTOLIC BLOOD PRESSURE: 71 MMHG | RESPIRATION RATE: 18 BRPM

## 2023-05-05 PROCEDURE — 99231 SBSQ HOSP IP/OBS SF/LOW 25: CPT | Mod: GC

## 2023-05-05 PROCEDURE — 99239 HOSP IP/OBS DSCHRG MGMT >30: CPT

## 2023-05-05 PROCEDURE — 95718 EEG PHYS/QHP 2-12 HR W/VEEG: CPT

## 2023-05-05 PROCEDURE — 93010 ELECTROCARDIOGRAM REPORT: CPT

## 2023-05-05 RX ORDER — ASPIRIN/CALCIUM CARB/MAGNESIUM 324 MG
1 TABLET ORAL
Qty: 30 | Refills: 0
Start: 2023-05-05 | End: 2023-06-03

## 2023-05-05 RX ORDER — ATORVASTATIN CALCIUM 80 MG/1
1 TABLET, FILM COATED ORAL
Qty: 30 | Refills: 0
Start: 2023-05-05 | End: 2023-06-03

## 2023-05-05 RX ORDER — LACOSAMIDE 50 MG/1
50 TABLET ORAL
Refills: 0 | Status: DISCONTINUED | OUTPATIENT
Start: 2023-05-05 | End: 2023-05-05

## 2023-05-05 RX ORDER — LACOSAMIDE 50 MG/1
1 TABLET ORAL
Qty: 60 | Refills: 0
Start: 2023-05-05 | End: 2023-06-03

## 2023-05-05 RX ORDER — ERGOCALCIFEROL 1.25 MG/1
1 CAPSULE ORAL
Qty: 4 | Refills: 0
Start: 2023-05-05 | End: 2023-06-03

## 2023-05-05 RX ADMIN — LACOSAMIDE 50 MILLIGRAM(S): 50 TABLET ORAL at 15:12

## 2023-05-05 RX ADMIN — ERGOCALCIFEROL 50000 UNIT(S): 1.25 CAPSULE ORAL at 16:42

## 2023-05-05 RX ADMIN — CARBIDOPA AND LEVODOPA 1 TABLET(S): 25; 100 TABLET ORAL at 06:12

## 2023-05-05 RX ADMIN — CARBIDOPA AND LEVODOPA 1 TABLET(S): 25; 100 TABLET ORAL at 14:02

## 2023-05-05 NOTE — PROGRESS NOTE ADULT - PROBLEM/PLAN-5
Addended by: [place] on: 1/31/2023 09:39 AM     Modules accepted: Orders    
DISPLAY PLAN FREE TEXT
DISPLAY PLAN FREE TEXT

## 2023-05-05 NOTE — PROGRESS NOTE ADULT - SUBJECTIVE AND OBJECTIVE BOX
INTERVAL HPI/OVERNIGHT EVENTS:    SUBJECTIVE: Patient seen and examined at bedside.    OBJECTIVE:    VITAL SIGNS:  ICU Vital Signs Last 24 Hrs  T(C): 36.5 (05 May 2023 04:41), Max: 37.3 (04 May 2023 20:01)  T(F): 97.7 (05 May 2023 04:41), Max: 99.2 (04 May 2023 20:01)  HR: 64 (05 May 2023 04:41) (64 - 78)  BP: 172/76 (05 May 2023 04:41) (107/66 - 172/76)  BP(mean): --  ABP: --  ABP(mean): --  RR: 17 (05 May 2023 04:41) (17 - 18)  SpO2: 100% (05 May 2023 04:41) (99% - 100%)    O2 Parameters below as of 05 May 2023 04:41  Patient On (Oxygen Delivery Method): room air              05-04 @ 07:01  -  05-05 @ 07:00  --------------------------------------------------------  IN: 150 mL / OUT: 120 mL / NET: 30 mL      CAPILLARY BLOOD GLUCOSE      POCT Blood Glucose.: 132 mg/dL (03 May 2023 14:45)      PHYSICAL EXAM:    General: NAD  HEENT: NC/AT; PERRL, clear conjunctiva  Neck: supple  Respiratory: CTA b/l  Cardiovascular: +S1/S2; RRR  Abdomen: soft, NT/ND; +BS x4  Extremities:  no LE edema  Vascular: WWP, 2+ peripheral pulses b/l;  Skin: normal color and turgor; no rash  Neurological: A&Ox3, move all extremities. CN II-XII intact    MEDICATIONS:  MEDICATIONS  (STANDING):  aspirin enteric coated 81 milliGRAM(s) Oral daily  atorvastatin 40 milliGRAM(s) Oral at bedtime  carbidopa/levodopa  25/100 1 Tablet(s) Oral three times a day  enoxaparin Injectable 40 milliGRAM(s) SubCutaneous every 24 hours  ergocalciferol 95978 Unit(s) Oral once  Pentosan Polysulfate Sodium 100mg Cap 1 Capsule(s)   Oral daily    MEDICATIONS  (PRN):      ALLERGIES:  Allergies    Allergy Status Unknown    Intolerances        LABS:                        12.9   9.02  )-----------( 197      ( 04 May 2023 04:00 )             41.0     05    142  |  107  |  14  ----------------------------<  99  3.9   |  24  |  0.81    Ca    9.1      04 May 2023 04:00    TPro  7.2  /  Alb  4.5  /  TBili  0.5  /  DBili  x   /  AST  17  /  ALT  15  /  AlkPhos  68  05-03    PT/INR - ( 03 May 2023 07:35 )   PT: 12.0 sec;   INR: 1.03 ratio         PTT - ( 03 May 2023 07:35 )  PTT:31.0 sec  Urinalysis Basic - ( 03 May 2023 10:22 )    Color: Yellow / Appearance: Slightly Turbid / S.019 / pH: x  Gluc: x / Ketone: Negative  / Bili: Negative / Urobili: <2 mg/dL   Blood: x / Protein: Trace / Nitrite: Negative   Leuk Esterase: Negative / RBC: 2 /HPF / WBC 2 /HPF   Sq Epi: x / Non Sq Epi: x / Bacteria: Negative        RADIOLOGY & ADDITIONAL TESTS: Reviewed. INTERVAL HPI/OVERNIGHT EVENTS: NGHIA OVN     SUBJECTIVE: Patient seen and examined at bedside. Patient reports no further episodes of dysarthria or confusion overnight. Denies weakness.     OBJECTIVE:    VITAL SIGNS:  ICU Vital Signs Last 24 Hrs  T(C): 36.5 (05 May 2023 04:41), Max: 37.3 (04 May 2023 20:01)  T(F): 97.7 (05 May 2023 04:41), Max: 99.2 (04 May 2023 20:01)  HR: 64 (05 May 2023 04:41) (64 - 78)  BP: 172/76 (05 May 2023 04:41) (107/66 - 172/76)  BP(mean): --  ABP: --  ABP(mean): --  RR: 17 (05 May 2023 04:41) (17 - 18)  SpO2: 100% (05 May 2023 04:41) (99% - 100%)    O2 Parameters below as of 05 May 2023 04:41  Patient On (Oxygen Delivery Method): room air              -04 @ 07:01  -   @ 07:00  --------------------------------------------------------  IN: 150 mL / OUT: 120 mL / NET: 30 mL      CAPILLARY BLOOD GLUCOSE      POCT Blood Glucose.: 132 mg/dL (03 May 2023 14:45)      PHYSICAL EXAM:    General: NAD  HEENT: NC/AT; PERRL, clear conjunctiva  Neck: supple  Respiratory: CTA b/l  Cardiovascular: +S1/S2; RRR  Abdomen: soft, NT/ND; +BS x4  Extremities:  no LE edema  Vascular: WWP, 2+ peripheral pulses b/l;  Skin: normal color and turgor; no rash  Neurological: A&Ox3, move all extremities. CN II-XII intact; Cogwheel rigidity     MEDICATIONS:  MEDICATIONS  (STANDING):  aspirin enteric coated 81 milliGRAM(s) Oral daily  atorvastatin 40 milliGRAM(s) Oral at bedtime  carbidopa/levodopa  25/100 1 Tablet(s) Oral three times a day  enoxaparin Injectable 40 milliGRAM(s) SubCutaneous every 24 hours  ergocalciferol 04297 Unit(s) Oral once  Pentosan Polysulfate Sodium 100mg Cap 1 Capsule(s)   Oral daily    MEDICATIONS  (PRN):      ALLERGIES:  Allergies    Allergy Status Unknown    Intolerances        LABS:                        12.9   9.02  )-----------( 197      ( 04 May 2023 04:00 )             41.0     05-04    142  |  107  |  14  ----------------------------<  99  3.9   |  24  |  0.81    Ca    9.1      04 May 2023 04:00    TPro  7.2  /  Alb  4.5  /  TBili  0.5  /  DBili  x   /  AST  17  /  ALT  15  /  AlkPhos  68  05-03    PT/INR - ( 03 May 2023 07:35 )   PT: 12.0 sec;   INR: 1.03 ratio         PTT - ( 03 May 2023 07:35 )  PTT:31.0 sec  Urinalysis Basic - ( 03 May 2023 10:22 )    Color: Yellow / Appearance: Slightly Turbid / S.019 / pH: x  Gluc: x / Ketone: Negative  / Bili: Negative / Urobili: <2 mg/dL   Blood: x / Protein: Trace / Nitrite: Negative   Leuk Esterase: Negative / RBC: 2 /HPF / WBC 2 /HPF   Sq Epi: x / Non Sq Epi: x / Bacteria: Negative        RADIOLOGY & ADDITIONAL TESTS: Reviewed.

## 2023-05-05 NOTE — PROGRESS NOTE ADULT - ATTENDING COMMENTS
77M with hx of Parkinson's Disease, Prior CVA p/w episodes of slurred speech/slowing movements concerning for progression of parkinson's disease vs seizures vs toxic metabolic syndrome.    Patient seen and examined, wife at bedside. Reports that he has not had any further episodes of shaking/confusion since admission. Wife states he is more alert, eating much better today. Discussed EEG results are negative for seizures. Discussed neurology clearance prior to discharge.     #Episodes of altered level of consciousness  - Unclear etiology, seizures vs. TIA vs. progression of PD  - Neuro following and recs appreciated   - Hold off CT head as patient/family declining   - Monitor on tele, check TTE  - vEEG done, negative for seizures, with mild generalized slowing.  - Start on ASA/statin for old lacunar infarcts on MRI. Discussed w/ wife and patient, they are declining ASA as patient has hx of hemorrhagic cystitis last year requiring hyperbaric treatment. Report that urologist told him he would need his bladder removed if it happened again. Do NOT want to risk any bleeding with aspirin. Willing to take atorvastatin.    - Infectious work-up negative    #Dispo - PT recs [facility] but patient and family prefer home. If cleared by neuro, then patient can be discharged today - will follow-up on final plan. CM assistance for dispo planning to home.     Rest of plan as above  Discussed w/ patient and wife at bedside   Discussed w/ HS Dr. Edgar    34 min spent on discharge planning 77M with hx of Parkinson's Disease, Prior CVA p/w episodes of slurred speech/slowing movements concerning for progression of parkinson's disease vs seizures vs toxic metabolic syndrome.    Patient seen and examined, wife at bedside. Reports that he has not had any further episodes of shaking/confusion since admission. Wife states he is more alert, eating much better today. Discussed EEG results are negative for seizures. Discussed neurology clearance prior to discharge.     #Episodes of altered level of consciousness  - Unclear etiology, seizures vs. TIA vs. progression of PD  - Neuro following and recs appreciated   - Hold off CT head as patient/family declining   - Monitor on tele, check TTE  - vEEG done, negative for seizures, with mild generalized slowing.  - Start on ASA/statin for old lacunar infarcts on MRI. Discussed w/ wife and patient, they are declining ASA as patient has hx of hemorrhagic cystitis last year requiring hyperbaric treatment. Report that urologist told him he would need his bladder removed if it happened again. Do NOT want to risk any bleeding with aspirin. Willing to take atorvastatin.    - Infectious work-up negative    #Dispo - PT recs [facility] but patient and family prefer home. If cleared by neuro, then patient can be discharged today - will follow-up on final plan. CM assistance for dispo planning to home.     3PM ADDENDUM: Neuro to start Lacosamide, will give first dose in hospital, per neuro discussion with medicine team, since patient is not being loaded w/ lacosamide he does not need to be monitored in hospital. OK to discharge home today.     Rest of plan as above  Discussed w/ patient and wife at bedside   Discussed w/ HS Dr. Edgar    34 min spent on discharge planning

## 2023-05-05 NOTE — EEG REPORT - NS EEG TEXT BOX
[facility]   COMPREHENSIVE EPILEPSY CENTER   REPORT OF CONTINUOUS VIDEO EEG     [facility]: [address], [place] [address], Ph#: [phone]  [facility]: [phone] [place] [address], Ph#: [phone]  [facility]: [address] [place] [address], Ph#: [phone]    Patient Name: ADELAIDE MENDOZA  Age and : 77y ([birth year])  MRN #: [id number]  Location: [facility] [address]  Referring Physician: Melisa Mckeon    Start Time/Date: 0900 on 23  End Time/Date: 08:00 on 23  Duration 23H    _____________________________________________________________  STUDY INFORMATION    EEG Recording Technique:  The patient underwent continuous Video-EEG monitoring, using Telemetry System hardware on the XLTek Digital System. EEG and video data were stored on a computer hard drive with important events saved in digital archive files. The material was reviewed by a physician (electroencephalographer / epileptologist) on a daily basis. Karlos and seizure detection algorithms were utilized and reviewed. An EEG Technician attended to the patient, and was available throughout daytime work hours.  The epilepsy center neurologist was available in person or on call 24-hours per day.    EEG Placement and Labeling of Electrodes:  The EEG was performed utilizing 20 channel referential EEG connections (coronal over temporal over parasagittal montage) using all standard 10-20 electrode placements with EKG, with additional electrodes placed in the inferior temporal region using the modified 10-10 montage electrode placements for elective admissions, or if deemed necessary. Recording was at a sampling rate of 256 samples per second per channel. Time synchronized digital video recording was done simultaneously with EEG recording. A low light infrared camera was used for low light recording.     _____________________________________________________________  HISTORY    Patient is a 77y old  Male who presents with a chief complaint of Slurred Speech (05 May 2023 09:05)      PERTINENT MEDICATION:  MEDICATIONS  (STANDING):  aspirin enteric coated 81 milliGRAM(s) Oral daily  atorvastatin 40 milliGRAM(s) Oral at bedtime  carbidopa/levodopa  25/100 1 Tablet(s) Oral three times a day  enoxaparin Injectable 40 milliGRAM(s) SubCutaneous every 24 hours  ergocalciferol 34798 Unit(s) Oral once  Pentosan Polysulfate Sodium 100mg Cap 1 Capsule(s)   Oral daily    _____________________________________________________________  STUDY INTERPRETATION    Findings: The background was continuous, spontaneously variable and reactive. During wakefulness, the posterior dominant rhythm consisted of symmetric, 8Hz activity, with amplitude to 30 uV, that attenuated to eye opening.  Low amplitude frontal beta was noted in wakefulness.    Background Slowing:  Excess diffuse polymorphic delta slowing.    Focal Slowing:   None were present.       Sleep Background:  Drowsiness was characterized by fragmentation, attenuation, and slowing of the background activity.    Sleep was characterized by the presence of vertex waves, symmetric sleep spindles and K-complexes.    Other Non-Epileptiform Findings:  None were present.    Interictal Epileptiform Activity:   None were present.    Events:  Clinical events: None recorded.  Seizures: None recorded.    Activation Procedures:   Hyperventilation was not performed.    Photic stimulation was not performed.     Artifacts:  Intermittent myogenic and movement artifacts were noted.    _____________________________________________________________  EEG SUMMARY/CLASSIFICATION    Abnormal EEG in the awake, drowsy and asleep states.  - Mild generalized slowing.    _____________________________________________________________  EEG IMPRESSION/CLINICAL CORRELATE      Abnormal EEG study.  Mild nonspecific diffuse or multifocal cerebral dysfunction.   No epileptiform pattern or seizure seen.    **In absence of additional clinical concerns, recommend consideration for discontinuation of current EEG study with reconnection in future if warranted.    Richar Oneal MD  EEG/Epilepsy Attending  [facility]   COMPREHENSIVE EPILEPSY CENTER   REPORT OF CONTINUOUS VIDEO EEG     [facility]: [address], [place] [address], Ph#: [phone]  [facility]: [phone] [place] [address], Ph#: [phone]  [facility]: [address] [place] [address], Ph#: [phone]    Patient Name: ADELAIDE MENDOZA  Age and : 77y (43)  MRN #: [id number]  Location: [facility] [address]  Referring Physician: Melisa Mckeon    Start Time/Date: 0900 on 23  End Time/Date: 12:00 on 23  Duration 27H    _____________________________________________________________  STUDY INFORMATION    EEG Recording Technique:  The patient underwent continuous Video-EEG monitoring, using Telemetry System hardware on the XLTek Digital System. EEG and video data were stored on a computer hard drive with important events saved in digital archive files. The material was reviewed by a physician (electroencephalographer / epileptologist) on a daily basis. Karlos and seizure detection algorithms were utilized and reviewed. An EEG Technician attended to the patient, and was available throughout daytime work hours.  The epilepsy center neurologist was available in person or on call 24-hours per day.    EEG Placement and Labeling of Electrodes:  The EEG was performed utilizing 20 channel referential EEG connections (coronal over temporal over parasagittal montage) using all standard 10-20 electrode placements with EKG, with additional electrodes placed in the inferior temporal region using the modified 10-10 montage electrode placements for elective admissions, or if deemed necessary. Recording was at a sampling rate of 256 samples per second per channel. Time synchronized digital video recording was done simultaneously with EEG recording. A low light infrared camera was used for low light recording.     _____________________________________________________________  HISTORY    Patient is a 77y old  Male who presents with a chief complaint of Slurred Speech (05 May 2023 09:05)      PERTINENT MEDICATION:  MEDICATIONS  (STANDING):  aspirin enteric coated 81 milliGRAM(s) Oral daily  atorvastatin 40 milliGRAM(s) Oral at bedtime  carbidopa/levodopa  25/100 1 Tablet(s) Oral three times a day  enoxaparin Injectable 40 milliGRAM(s) SubCutaneous every 24 hours  ergocalciferol 58999 Unit(s) Oral once  Pentosan Polysulfate Sodium 100mg Cap 1 Capsule(s)   Oral daily    _____________________________________________________________  STUDY INTERPRETATION    Findings: The background was continuous, spontaneously variable and reactive. During wakefulness, the posterior dominant rhythm consisted of symmetric, 8Hz activity, with amplitude to 30 uV, that attenuated to eye opening.  Low amplitude frontal beta was noted in wakefulness.    Background Slowing:  Excess diffuse polymorphic delta slowing.    Focal Slowing:   None were present.       Sleep Background:  Drowsiness was characterized by fragmentation, attenuation, and slowing of the background activity.    Sleep was characterized by the presence of vertex waves, symmetric sleep spindles and K-complexes.    Other Non-Epileptiform Findings:  None were present.    Interictal Epileptiform Activity:   None were present.    Events:  Clinical events: None recorded.  Seizures: None recorded.    Activation Procedures:   Hyperventilation was not performed.    Photic stimulation was not performed.     Artifacts:  Intermittent myogenic and movement artifacts were noted.    _____________________________________________________________  EEG SUMMARY/CLASSIFICATION    Abnormal EEG in the awake, drowsy and asleep states.  - Mild generalized slowing.    _____________________________________________________________  EEG IMPRESSION/CLINICAL CORRELATE      Abnormal EEG study.  Mild nonspecific diffuse or multifocal cerebral dysfunction.   No epileptiform pattern or seizure seen.    **In absence of additional clinical concerns, recommend consideration for discontinuation of current EEG study with reconnection in future if warranted.    Richar Oneal MD  EEG/Epilepsy Attending

## 2023-05-05 NOTE — DISCHARGE NOTE PROVIDER - NSDCCPTREATMENT_GEN_ALL_CORE_FT
PRINCIPAL PROCEDURE  Procedure: EEG awake and asleep  Findings and Treatment: STUDY INTERPRETATION  Findings: The background was continuous, spontaneously variable and reactive. During wakefulness, the posterior dominant rhythm consisted of symmetric, 8Hz activity, with amplitude to 30 uV, that attenuated to eye opening.  Low amplitude frontal beta was noted in wakefulness.  Background Slowing:  Excess diffuse polymorphic delta slowing.  Focal Slowing:   None were present.     Sleep Background:  Drowsiness was characterized by fragmentation, attenuation, and slowing of the background activity.    Sleep was characterized by the presence of vertex waves, symmetric sleep spindles and K-complexes.  Other Non-Epileptiform Findings:  None were present.  Interictal Epileptiform Activity:   None were present.  Events:  Clinical events: None recorded.  Seizures: None recorded.  Activation Procedures:   Hyperventilation was not performed.    Photic stimulation was not performed.   Artifacts:  Intermittent myogenic and movement artifacts were noted.  _____________________________________________________________  EEG SUMMARY/CLASSIFICATION  Abnormal EEG in the awake, drowsy and asleep states.  - Mild generalized slowing.  _____________________________________________________________  EEG IMPRESSION/CLINICAL CORRELATE  Abnormal EEG study.  Mild nonspecific diffuse or multifocal cerebral dysfunction.   No epileptiform pattern or seizure seen.  **In absence of additional clinical concerns, recommend consideration for discontinuation of current EEG study with reconnection in future if warranted.

## 2023-05-05 NOTE — DISCHARGE NOTE PROVIDER - NSDCCAREPROVSEEN_GEN_ALL_CORE_FT
[facility]  INFECTIOUS DISEASES AND INTERNAL MEDICINE at [place]  =======================================================  Carlos Alberto Soto MD  Diplomates American Board of Internal Medicine and Infectious Diseases  =======================================================    HERACLIO MOLINA [id number]    Follow up: R pleural effusion    Pain has improved today. No fever. No diarrhea. On TPN.    R pleural effusion s/p VATS, pancreaticopleural fistula.     Allergies:  Ensure TID (Unknown)  No Known Allergies    Antibiotics:  meropenem  IVPB 1000 milliGRAM(s) IV Intermittent every 8 hours    REVIEW OF SYSTEMS:  CONSTITUTIONAL:  No Fever or chills  HEENT:   No diplopia or blurred vision.  No earache, sore throat or runny nose.  CARDIOVASCULAR:  No chest pain or SOB, pain on chest tube site  RESPIRATORY:  No cough, shortness of breath, PND or orthopnea.  GASTROINTESTINAL:  No nausea, vomiting, no more diarrhea.  GENITOURINARY:  No dysuria, frequency or urgency. No Blood in urine  MUSCULOSKELETAL:  no joint aches, no muscle pain  SKIN:  No change in skin, hair or nails.  NEUROLOGIC:  No paresthesias or weakness.  PSYCHIATRIC:  No disorder of thought or mood.  ENDOCRINE:  No heat or cold intolerance, polyuria or polydipsia.  HEMATOLOGICAL:  No easy bruising or bleeding.      Physical Exam:  Vital Signs Last 24 Hrs  T(C): 36.4 (19 Dec 2019 11:04), Max: 37.2 (18 Dec 2019 15:10)  T(F): 97.6 (19 Dec 2019 11:04), Max: 98.9 (18 Dec 2019 15:10)  HR: 106 (19 Dec 2019 11:04) (106 - 124)  BP: 120/78 (19 Dec 2019 11:04) (120/78 - 156/101)  BP(mean): --  RR: 18 (19 Dec 2019 11:04) (17 - 18)  SpO2: 98% (19 Dec 2019 11:04) (94% - 98%)  GEN: NAD  HEENT: normocephalic and atraumatic. EOMI. ROSALIE.    NECK: Supple.  No lymphadenopathy   LUNGS: R chest with decreased breath sounds, R chest tube has been removed, has vacuum dressing.   HEART: Regular rate and rhythm   ABDOMEN: Soft, nontender, and nondistended.  Positive bowel sounds.    : No CVA tenderness  EXTREMITIES: + edema. + PICC on TPN  MSK: no joint swelling  NEUROLOGIC: grossly intact.  PSYCHIATRIC: Appropriate affect .  SKIN: No ulceration or induration present.      Labs:  12-19    141  |  105  |  5.0<L>  ----------------------------<  111  4.2   |  23.0  |  0.35<L>    Ca    8.6      19 Dec 2019 07:18  Phos  3.6     12-19  Mg     1.6     12-19    TPro  5.8<L>  /  Alb  2.4<L>  /  TBili  <0.2<L>  /  DBili  x   /  AST  24  /  ALT  12  /  AlkPhos  317<H>  12-18                        10.2   17.40 )-----------( 325      ( 19 Dec 2019 07:18 )             32.5     PT/INR - ( 19 Dec 2019 04:56 )   PT: 15.5 sec;   INR: 1.34 ratio    PTT - ( 19 Dec 2019 04:56 )  PTT:63.3 sec    LIVER FUNCTIONS - ( 18 Dec 2019 06:40 )  Alb: 2.4 g/dL / Pro: 5.8 g/dL / ALK PHOS: 317 U/L / ALT: 12 U/L / AST: 24 U/L / GGT: x           RECENT CULTURES:  12-04 @ 16:28 .Stool     No Protozoa seen by trichrome stain  No Helminths or Protozoa seen in formalin concentrate    All imaging and data are reviewed.   < from: CT Chest No Cont (12.07.19 @ 13:51) >   EXAM:  CT CHEST                        PROCEDURE DATE:  12/07/2019    INTERPRETATION:  Clinical information: Right chest collection. Status   post VATS 11/27  Comparison: Chest CT dated 12/4/2019  PROCEDURE:   CT of the Chest was performed without intravenous contrast.  FINDINGS:  Interval removal of right chest tube.  Air-containing tract noted from   the skin to the pleural space at site of prior chest tube on series 3   image 137.  There is a skin defect in the right lateral chest wall again   noted with subcutaneous air in this region and some deeper mildly complex   fluid again noted as seen on series 3 image 99. There is a new skin   defect on series 3 image 121 with tract of air extending into the right   pleural space. Previously identified anteriorly loculated right   hydropneumothorax demonstrates interval resolution of the fluid component   with the overall size of the stable now replaced by air. Atelectasis   involving the right lower lobe inferiorly is again noted. Fluid loculated   in the major fissure has slightly increased. Patchy opacities left upper   lobe predominantly groundglass are unchanged. Subsegmental atelectasis in   the lingula and left lower lobe. Patchy groundglass opacity now noted in   the left lower lobe.  Trace bilateral pleural effusions.  Mediastinum and Lyndsey: No abnormally enlarged lymph nodes.  Thickened distal esophagus.  Heart: Normal size. No pericardial effusion.   Vessels: within normal limits.  Upper abdomen: Residual contrast within the esophagus and stomach related   to recent esophagram. Residual contrast within the renal parenchyma   raising the possibility of acute tubular necrosis given recent   intravenous contrast administration on 12/4/2019.  Bones: Within normal limits.  IMPRESSION: Interval removal of right chest tube with tract from prior   chest tube now containing air.  Right hydropneumothorax again noted. The   previously identified loculated fluid and air anteriorly has demonstrated   resolution of the fluid component in this region with air now filling the   same size space.  Skin defect with subcutaneous air and complex fluid right lateral chest   wall again noted.  New skin defect right lower anterolateral chest wallwith air tract to   the right pleural space now noted.  Slight increase in fluid tracking in the right major fissure.  Areas of atelectasis in the right lung and opacities in the left upper   lobe are unchanged.  Thickened distal esophagus again noted may be on the basis of esophagitis.  Persistent renal nephrograms bilaterally may be seen in the setting of   acute tubular necrosis given recent intravenous contrast administration.   Correlation with serum creatinine recommended.    Assessment and Plan:   33 y.o. F who was admitted:    transferred from [facility] to [facility] on 11/22 for complaints of weakness, 25 pound weight loss & flu like symptoms & found to have a large right pleural effusion,   pleural fluid cultures + for group A hemolytic strep, + RIJ thormbus   11/23 CT of the chest/abd/pelvis indicated a clot in the Right IJ, pt started on heparin gtt.   s/p right VATS decort & washout debridement of CT site & VAC placement on 11/27/19.   Persistent drainage from chest tube site so repeat CT C/A/P performed 12/4 which showed new cystic mass in pancreas and liver concerning for pancreatitis  Zosyn changed to meropenem on 12/6  for broader coverage given pancreatitis  Copious drainage from anterior chest tube site, so VAC removed and ostomy appliance applied for high output on 12/6.   12/7 Right chest tube removed.   Repeat CT scan showed a persistent Right hydropneumothorax with air anteriorly  12/8 Ostomy appliance removed and dressing applied.   12/9 s.p PICC placement & Right anterior pig tail catheter & right lateral wound vac placement   MRCP significant for Peripancreatic fluid and edema consistent with pancreatitis. Cystic lesions in the pancreatic head with the largest measuring 2.1 cm, likely acute peripancreatic collections, increased in size since 12/4/2019, with a tract of fluid extending from the 2.1 cm cystic lesion into the mediastinum and towards the pleural space on the right suspicious for a pancreatic pleural fistula.    Persistent R pleural effusion  Pancreatitis  R pancreaticopleural fistula   Alcohol abuse     11/24 zosyn  11/24 to 12/3 ceftriaxone   12/3 to 12/6 zosyn  12/6 to present meropenem    - All cultures negative , blood, urine, pleural fluid and chest tube insertion site.  - Leukocytosis improving from 42k is 17k now.   - No fever   - Continue Meropenem 1gm q8h for necrotizing pancreatitis and fistula with pleural space. GI and surgery following.   - Has 2 PICC lines, on TPN and ABx, watch for superinfection  - Based on her response and Chest tube output/pleural effusion will decide about the length of treatment.   - CXR with worsening of effusion.  - Awaiting Repeat MRI to evaluate the fistula, pancreatitis and effusion     Will follow. MELISSAJ Team 5

## 2023-05-05 NOTE — DISCHARGE NOTE NURSING/CASE MANAGEMENT/SOCIAL WORK - PATIENT PORTAL LINK FT
You can access the FollowMyHealth Patient Portal offered by [facility] by registering at the following website: [email or web address]. By joining [facility]’s FollowMyHealth portal, you will also be able to view your health information using other applications (apps) compatible with our system.

## 2023-05-05 NOTE — PROGRESS NOTE ADULT - PROBLEM SELECTOR PLAN 5
DVT: SCDs, patient refusing lovenox   Diet: Regular (passed dysphagia screen)  Dispo: Pending PT eval   Ethics: Full Code, Wife HCP DVT: SCDs, patient refusing lovenox   Diet: Regular (passed dysphagia screen)  Dispo: Home with Home PT   Ethics: Full Code, Wife HCP

## 2023-05-05 NOTE — PROGRESS NOTE ADULT - ATTENDING COMMENTS
Patient was seen and examined at bedside. During follow up visit, patient wife was present. Patient and wife confirmed that patient is significantly better and patient did not have any further episode during EEG.    During my evaluation, no focal deficit but consistent with bradykinesia, hypophonic speech, mildly increase the tone, cogwheel rigidity and mild postural tremors.   Mr. Rodriguez is a 77 year old right handed man with PMHx of Parkinson's disease on sinemet, other medical problems who was brought to [facility] ER due to the episodes of freezing, slurred speech and confusion. Continue Sinemet 25/100 mg TID. VEEG did not showed any seizure and no new events were captured during EEG. Resident spoke with Dr. Ortega regarding hospital course update.  He recommended low dose of Vimpat and out patient follow up visit.  There is no further neurology work up needed as inpatient.   If you have any further questions, please do not hesitate to contact our team.  Thank you for allowing us to participate in this patient care.

## 2023-05-05 NOTE — DISCHARGE NOTE PROVIDER - CARE PROVIDER_API CALL
Ray Ortega; PhD)  Neurology  [address], [address]  [place] [address]  Phone: [phone]  Fax: [phone]  Established Patient  Follow Up Time: 1-3 days

## 2023-05-05 NOTE — PROGRESS NOTE ADULT - PROBLEM SELECTOR PLAN 1
Patient with episodes of slurred speech lasting 10-20 mins   Neurology consulted   UA negative; CXRay clear unlikely infectious etiology   Vitamin D level low - started on maintainance D3 (1000 units QD)   Ammonia, TSH, B12 wnl    Plan:   >ctm on telemetry for r/o afib (conversion in and out of afib can result in strokes)   >f/u TTE to r/o PFO  >f/u vEEG   >f/u neuro recs

## 2023-05-05 NOTE — PROGRESS NOTE ADULT - SUBJECTIVE AND OBJECTIVE BOX
Neurology  Progress Note  05-05-23    Name: ADELAIDE MENDOZA 77y    Review of Systems: No complaints this morning. Patient's spouse reporting possibly / questionably had slight slurring yesterday at around 16:00 PM, but it was not similar to the events that brought him to the hospital. Overall, no recurrence of symptoms while on EEG. Patient and family would like to go home today.    Medications:  MEDICATIONS  (STANDING):  aspirin enteric coated 81 milliGRAM(s) Oral daily  atorvastatin 40 milliGRAM(s) Oral at bedtime  carbidopa/levodopa  25/100 1 Tablet(s) Oral three times a day  enoxaparin Injectable 40 milliGRAM(s) SubCutaneous every 24 hours  ergocalciferol 53214 Unit(s) Oral once  Pentosan Polysulfate Sodium 100mg Cap 1 Capsule(s)   Oral daily    MEDICATIONS  (PRN):      Vitals:  T(C): 37 (05-05-23 @ 08:10), Max: 37.3 (05-04-23 @ 20:01)  HR: 69 (05-05-23 @ 08:10) (64 - 73)  BP: 118/61 (05-05-23 @ 08:10) (107/66 - 172/76)  RR: 18 (05-05-23 @ 08:10) (17 - 18)  SpO2: 98% (05-05-23 @ 08:10) (98% - 100%)      Neurologic Examination: INCOMPLETE    - Mental Status: Eyes open, attends to examiner; oriented to person, month, year, location, and situation; speech is fluent with intact repetition, and follows 1-step commands, did not follow 2-step midline crossing command; good overall fund of knowledge (aware of current events, relevant past history, and vocabulary appropriate for level of education);      - Cranial Nerves:  II: Visual fields are full to confrontation; pupils are equal, round, and reactive to light   III, IV, VI: Limitation on upgaze, otherwise extraocular movements are intact without nystagmus  V: Facial sensation is intact in the V1-V3 distribution bilaterally  VII: Face is symmetric with normal eye closure and smile  VIII: Hearing is intact to conversation  IX, X: Uvula is midline and soft palate rises symmetrically  XI: Shoulder shrug intact  XII: Tongue protrudes in the midline    - Motor/Strength Testing:  - No drifts b/l UE                                   Right           Left  Deltoid                     5                 5  Biceps                      5                 5  Triceps                     5                 5  Hand                  5                 5  Hip Flex                   5                  5  Knee Ext	      5                  5  Plantarflex               5                  5    - There is no pronator drift.     - Tone: cogwheel rigidity at b/l wrists    - Reflexes:   Bicep (C5/C6):                  R 2+ --- L 2+   Brachioradialis (C5/C6) :   R 2+ --- L 2+     - Sensory: Intact throughout to light touch x 4 extremities.  - Cortical: No extinction to dual simultaneous cutaneous or visual stimulation.  - Coordination: Finger-nose-finger intact without ataxia or dysmetria.            Labs:                        12.9   9.02  )-----------( 197      ( 04 May 2023 04:00 )             41.0     05-04    142  |  107  |  14  ----------------------------<  99  3.9   |  24  |  0.81    Ca    9.1      04 May 2023 04:00      CAPILLARY BLOOD GLUCOSE      POCT Blood Glucose.: 132 mg/dL (03 May 2023 14:45)        Culture - Urine (collected 03 May 2023 06:53)  Source: Clean Catch Clean Catch (Midstream)  Final Report (04 May 2023 22:54):    >=3 organisms. Probable collection contamination.        CSF:                  Radiology:     Neurology  Progress Note  05-05-23    Name: ADELAIDE MENDOZA 77y    Review of Systems: No complaints this morning. Patient's spouse reporting possibly / questionably had slight slurring yesterday at around 16:00 PM, but it was not similar to the events that brought him to the hospital. Overall, no recurrence of symptoms while on EEG. Patient and family would like to go home today.    Medications:  MEDICATIONS  (STANDING):  aspirin enteric coated 81 milliGRAM(s) Oral daily  atorvastatin 40 milliGRAM(s) Oral at bedtime  carbidopa/levodopa  25/100 1 Tablet(s) Oral three times a day  enoxaparin Injectable 40 milliGRAM(s) SubCutaneous every 24 hours  ergocalciferol 60070 Unit(s) Oral once  Pentosan Polysulfate Sodium 100mg Cap 1 Capsule(s)   Oral daily    MEDICATIONS  (PRN):      Vitals:  T(C): 37 (05-05-23 @ 08:10), Max: 37.3 (05-04-23 @ 20:01)  HR: 69 (05-05-23 @ 08:10) (64 - 73)  BP: 118/61 (05-05-23 @ 08:10) (107/66 - 172/76)  RR: 18 (05-05-23 @ 08:10) (17 - 18)  SpO2: 98% (05-05-23 @ 08:10) (98% - 100%)      Neurologic Examination:      - Mental Status: Eyes open, attends to examiner; oriented to person, month, year, location, and situation; speech is fluent with intact repetition, and follows 1-step commands, did not follow 2-step midline crossing command; good overall fund of knowledge (aware of current events, relevant past history, and vocabulary appropriate for level of education);      - Cranial Nerves:  II: Visual fields are full to confrontation; pupils are equal, round, and reactive to light   III, IV, VI: Limitation on upgaze, otherwise extraocular movements are intact without nystagmus  V: Facial sensation is intact in the V1-V3 distribution bilaterally  VII: Face is symmetric with normal eye closure and smile  VIII: Hearing is intact to conversation  IX, X: Uvula is midline and soft palate rises symmetrically  XI: Shoulder shrug intact  XII: Tongue protrudes in the midline    - Motor/Strength Testing:  - No drifts b/l UE                                   Right           Left  Deltoid                     5                 5  Biceps                      5                 5  Triceps                     5                 5  Hand                  5                 5  Hip Flex                   5                  5  Knee Ext	      5                  5  Plantarflex               5                  5    - There is no pronator drift.     - Tone: cogwheel rigidity at b/l wrists    - Reflexes:   Bicep (C5/C6):                  R 2+ --- L 2+   Brachioradialis (C5/C6) :   R 2+ --- L 2+     - Sensory: Intact throughout to light touch x 4 extremities.  - Cortical: No extinction to dual simultaneous cutaneous or visual stimulation.  - Coordination: Finger-nose-finger intact without ataxia or dysmetria.        Labs:                        12.9   9.02  )-----------( 197      ( 04 May 2023 04:00 )             41.0     05-04    142  |  107  |  14  ----------------------------<  99  3.9   |  24  |  0.81    Ca    9.1      04 May 2023 04:00      CAPILLARY BLOOD GLUCOSE      POCT Blood Glucose.: 132 mg/dL (03 May 2023 14:45)        Culture - Urine (collected 03 May 2023 06:53)  Source: Clean Catch Clean Catch (Midstream)  Final Report (04 May 2023 22:54):    >=3 organisms. Probable collection contamination.      EEG REPORT 5/5/23:    Start Time/Date: 0900 on 5-4-23  End Time/Date: 08:00 on 05-05-23  Duration 23H    Events:  Clinical events: None recorded.  Seizures: None recorded.    Abnormal EEG study.  Mild nonspecific diffuse or multifocal cerebral dysfunction.   No epileptiform pattern or seizure seen.    **In absence of additional clinical concerns, recommend consideration for discontinuation of current EEG study with reconnection in future if warranted.    Radiology:    MR Brain Dementia, MR Angio Head & Neck  (04.07.23 @ 11:15)     EXAM: [id number] - MR ANGIO BRAIN  - ORDERED BY:  JOSE GREEN    EXAM: [id number] - MR ANGIO NECK  - ORDERED BY:  JOSE GREEN    EXAM: [id number] - MR BRAIN DEMENTIA W 3D#  - ORDERED BY:  JOSE GREEN    PROCEDURE DATE:  04/07/2023      INTERPRETATION:  Clinical indication: Parkinson's disease. Slurred speech.    MRI of the brain was performed using sagittal T1 axial T2 T2 FLAIR   diffusion and susceptibility weighted sequence. Neuro Quant was performed   as well.    MRA of the neck was performed using 2-D and 3-D time flight technique    MRA of the [place] performed using 3-D [place] technique    This exam is compared with prior brain MRI performed on September 11, 2022.    Parenchymal volume loss and chronic microvascular ischemic changes are   again identified.    Stable subcentimeter focus of T2 prolongation involving the right roshni is   identified. This could be compatible with stable area of ischemia. 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    There is no acute hemorrhage seen.    Evaluation of the diffusion weighted sequence demonstrates no abnormal   areas of restricted diffusion to suggest acute infarct.    Abnormal susceptibility involving the anterior right thalamic region is   identified which could be compatible area of old hemorrhage   mineralization or cavernous angioma. Smaller but similar area is seen   involving the left cerebellar region.    The large vessels demonstrate normal flow voids    Right frontal sinus mucosal thickening is seen.    Opacification of the right mastoid and middle ear region is again seen   and unchanged. Mild inflammatory change is seen involving the left   mastoid and middle earregions seen.    Neuro Quant data is available for review.    Both distal common carotid, proximal internal and external carotid   appears normal as well as both vertebral arteries. No significant   stenosis is seen.    Both distal internal carotid, anterior cerebral, middle cerebral, basilar   and posterior cerebral arteries appear normal without evidence of an   aneurysm or significant stenosis.    IMPRESSION: Abnormal areas of susceptibility involving the anterior right   thalamic and left cerebellar region. The rest of this MRI of the brain   appears unchanged when compared with the prior exam.    Unremarkable MRA of the neck and [place].    -- End of Report ---

## 2023-05-05 NOTE — PROGRESS NOTE ADULT - ASSESSMENT
Patient ADELAIDE MENDOZA is a 76yo M PMHx of PD on sinemet, prior colon CA w/ ostomy, prior prostate CA s/p radiation cystitis, blurry vision 2/2 cataracts baseline, who presents to ED for episodes of freezing, slurred speech. Patient followed by Dr Ortega (movement specialist). History from son and spouse bedside who reported that patient few weeks back had similar episode and since 5/2/23 has had multiple episodes with resolution of freezing, paucity of speech, slurred speech in between, confusion, not making sense (stating he is coming to the hospital to see eye doctor), inability to walk. Episodes still occurring while in hospital. Not on anticoagulant, antiplatelet agents given reported concern of hematuria from prior prostate issues. MRI done 4/7/23 showed old lacunar infarcts b/l BG and CR, old hemorrhage mineralization vs cavernous angioma in R thalamic region. Normal flow voids in vessels.      Impression: Episodes of freezing of movement, speech, slurred speech intermittently, confusion, difficulty walking with unclear etiology. Differential includes toxic/metabolic/ infectious etiology suspected. Would be unusual for multiple episodes with resolution to be TIA/ ischemic stroke. Can consider nonconvulsive seizures as well.     Recommendations:  [x] Consider toxic/metabolic/ infectious etiology  [x] Discussed with his movement specialist. Does not suspect from medication or PD symptoms and to consider alternative diagnoses  [x] Continue sinemet 25/100, 1 tab TID  [ ]  24 hour EEG, f/u complete report    [ ] From secondary stroke prevention, patient should be on aspirin 81mg daily given prior infarcts seen on recent MRI. Primary team and his PCP need to weigh risks/benefits given prior hx of hematuria versus stroke risk.   [ ] Atorvastatin 40-80 mg daily titrated per LDL < 70  [ ] HgbA1C, fasting lipid panel, ESR, CRP, TSH, T3/T4, A1c, thiamine, B12, folate   [ ] tele      - Neuro-checks and VS q4h   - Dysphagia screen. If fails, speech/swallow eval  - aspiration, fall precautions  - STAT CT head non-contrast for change in neuro exam.   - PT/ OT / DVT ppx per primary team      Discussed patient care and above recommendations with patient, family bedside and in agreement.  Non-neurologic findings seen on imaging to be worked up per primary team if applicable. Recommendations will be finalized/amended once seen and signed by attending.  
76 y/o M PMH Parkinson's Disease, Prior Strokes p/w episodes of slurred speech concerning for progression of parkinson's disease vs seizures vs toxic metabolic syndrome. 
Assessment: 77M PMHx of PD (on carbidopa/levodopa 25/100 TID, f/b Movement Neurologist Dr. Ortega), prior colon CA w/ ostomy, prior prostate CA s/p radiation cystitis, blurry vision 2/2 cataracts baseline, who presents to ED for episodes of freezing and dysarthria. A few weeks ago, patient had similar episode and since 5/2/23 has had multiple episodes with resolution of freezing, paucity of speech, dysarthria in between, confusion, not making sense (stating he is coming to the hospital to see eye doctor), inability to walk. Episodes still occurring while in hospital. Not on anticoagulant, antiplatelet agents given reported concern of hematuria from prior prostate issues.     Hospital course + additional assessment: [facility] stroke called 5/3/23 on day of arrival for similar symptoms. This writer observed patient at [facility] stroke. Patient's examination most prominently notable for being awake but not responsive or attending to examiner - eyes open w/ no verbal output. Patient drooling, but w/o obvious facial asymmetry. Blinking to threat bilaterally. Also w/ mild drift RUE, no drifts in LUE, no drifts in BLE. Patient then started speaking, gradually improving. No RUE on re-examination. Patient's son reporting similar symptoms at home, although this particular episode was worse. Patient deferring CT scans due to his concern for radiation (h/o prostate cancer). MR imaging not repeated as patient had similar symptoms for which he received an MR early April 2023, which was w/o acute findings. That MRI showing abnormal susceptibility anterior R thalamic and L cerebellar, could be old hemorrhage or cavernous angioma, neither of which would be consistent w/ epileptic focus. Continuous video EEG for 23 hours obtained, however, no events recurred while on EEG. EEG read as w/o epileptiform pattern or seizures. Patient and family preferring to go home and follow-up outpatient. Infectious workup negative. Patient's neurologist Dr. Ortega updated and overall case was discussed. Per Dr. Ortega, lower suspicion that symptoms are due to Parkinson's disease alone, or to his PD medications, which he has been on for quite some time. Although EEG negative, the benefits of treating as clinical seizure outweigh the risks.     Recommendations:  [] Discontinue EEG  [] EKG to assess NV interval prior to starting lacosamide (lacosamide can prolong NV)  [] Start and discharge on lacosamide 50 mg PO BID, if NV interval < 0.18 seconds  [] Follow-up outpatient with Dr. Ortega for further evaluation and management, including PD management and epilepsy workup (possibly ambulatory EEG)  [] f/u HgbA1C, fasting lipid panel, ESR, CRP, TSH, T3/T4, A1c, thiamine, B12, folate  [] c/w all home medications    * Case and plan not complete until attending attestation *
78 y/o M PMH Parkinson's Disease, Prior Strokes p/w episodes of slurred speech concerning for progression of parkinson's disease vs seizures vs toxic metabolic syndrome.

## 2023-05-05 NOTE — PROGRESS NOTE ADULT - PROBLEM SELECTOR PLAN 2
[Dear  ___] : Dear  [unfilled], [Consult Letter:] : I had the pleasure of evaluating your patient, [unfilled]. [( Thank you for referring [unfilled] for consultation for _____ )] : Thank you for referring [unfilled] for consultation for [unfilled] [Please see my note below.] : Please see my note below. [Consult Closing:] : Thank you very much for allowing me to participate in the care of this patient.  If you have any questions, please do not hesitate to contact me. [Sincerely,] : Sincerely, [FreeTextEntry3] : Chris Bejarano MD\par  of Urology\par [facility]\par \par Offices:\par [facility] @\par [address], [place] [address]\par and\par [address], [place] [address], [address]\par and\par [address] [address]\par \par TEL: [phone]\par FAX: [phone]  Patient with history of stroke not on aspirin for concern for prior hematuria    Plan:   >start patent on aspirin 81 and atorva 40   >monitor for signs of bleeding Patient with history of stroke not on aspirin for concern for prior hematuria    Plan:   >started patient on atorva 40   >patient refusing ASA 81

## 2023-05-05 NOTE — DISCHARGE NOTE PROVIDER - NSDCCPCAREPLAN_GEN_ALL_CORE_FT
PRINCIPAL DISCHARGE DIAGNOSIS  Diagnosis: Dysarthria  Assessment and Plan of Treatment: You came to the hospital for episodes of slurred speech, frozen movements. You were followed by the nuerologist who did not suspect a stroke. You had a video EEG placed to determine if you were having a seizure but no seizure was caught. However, given your symptoms, the neurology team has high suspicion of seizure and you were started on a medication called locosamide for seizures. Please be sure to followup with your neurologist within 1 week for further management.   If you develop palpitations, lightheadedness, shortness of breath, please return to the hospital.   We also tested your vitamin levels and found your Vitamin D level was low. You were started on vitamin D supplements (50,000 units weekly). Please continue to take this weekly and then followup with your primary care provider.   You were also started on atorvastatin for your history of prior strokes. Please continue to take this nightly.     PRINCIPAL DISCHARGE DIAGNOSIS  Diagnosis: Dysarthria  Assessment and Plan of Treatment: You came to the hospital for episodes of slurred speech, frozen movements. You were followed by the neurologist who did not suspect a stroke. You had a video EEG placed to determine if you were having a seizure but no seizure was caught. However, given your symptoms, the neurology team has high suspicion of seizure and you were started on a medication called locosamide for seizures. Please be sure to followup with your neurologist within 1 week for further management.   If you develop palpitations, lightheadedness, shortness of breath, please return to the hospital.   We also tested your vitamin levels and found your Vitamin D level was low. You were started on vitamin D supplements (50,000 units weekly). Please continue to take this weekly and then followup with your primary care provider.   You were also started on atorvastatin for your history of prior strokes. Please continue to take this nightly. You were also recommended to take a baby aspirin (81mg) daily to prevent further strokes, but you declined.

## 2023-05-05 NOTE — DISCHARGE NOTE PROVIDER - NSDCMRMEDTOKEN_GEN_ALL_CORE_FT
Elmiron 100 mg oral capsule: 1 orally once a day  Sinemet 25 mg-100 mg oral tablet: 1 orally 3 times a day   atorvastatin 40 mg oral tablet: 1 tab(s) orally once a day (at bedtime)  Elmiron 100 mg oral capsule: 1 orally once a day  ergocalciferol 1.25 mg (50,000 intl units) oral capsule: 1 cap(s) orally once a week  lacosamide 50 mg oral tablet: 1 tab(s) orally 2 times a day MDD: 100 mg  Sinemet 25 mg-100 mg oral tablet: 1 orally 3 times a day

## 2023-05-05 NOTE — PROGRESS NOTE ADULT - PROBLEM SELECTOR PLAN 3
Patient with history of prostate cancer s/p radiation therapy   Follows with Dr. Ames although looking for a new urologist     Plan:   >c/w elmirinon for radiation proctitis symptoms
Patient with history of prostate cancer s/p radiation therapy   Follows with Dr. Ames although looking for a new urologist     Plan:   >c/w elmirinon for radiation proctitis symptoms

## 2023-05-05 NOTE — DISCHARGE NOTE PROVIDER - HOSPITAL COURSE
Discharge Summary     Hospital Course:   For full details, please see H&P, progress notes, consult notes, and ancillary notes. 76 y/o M PMH Parkinson's Disease, Prior Strokes p/w episodes of slurred speech concerning for progression of parkinson's disease vs seizures vs toxic metabolic syndrome. Patient's metabolic workup was negative (TSH, B12, Folate wnl, Vitamin D low; patient started on 50,000 units weekly. Patient was monitored on telemetry with no evidence of arrythmia. vEEG negative for seizure however neurology team felt episodes were consistent with seizure like episodes. Patient was started on vimpat 50 mg BID prior to discharge.     On day of discharge, patient is clinically stable with no new exam findings or acute symptoms compared to prior. The patient was seen by the attending physician on the date of discharge and deemed stable and acceptable for discharge. The patient's chronic medical conditions were treated accordingly per the patient's home medication regimen. The patient's medication reconciliation, follow up appointments, discharge instructions, and significant lab and diagnostic studies are as noted.     Discharge follow up action items:     1. Follow up with PCP, neurologist  2. Follow up labs, path, & imaging   3. Medication changes  Added Atorvastatin 40 mg  Added Vimpat 50 mg BID     Patient disposition: Home with Home PT

## 2023-05-08 ENCOUNTER — NON-APPOINTMENT (OUTPATIENT)
Age: 78
End: 2023-05-08

## 2023-05-08 NOTE — CHART NOTE - NSCHARTNOTEFT_GEN_A_CORE
Post-Discharge Medication Review    Patient/caregiver was contacted to offer medication counseling post-discharge. Spoke with patient’s wife who is involved in patient’s care at number on file for patient [phone] and declined counseling on behalf of patient.     Of note, per patient’s wife, patient’s urologist had prescribed Elmiron 100 mg capsule with directions 1 capsule TID but patient’s wife notes patient has been taking 1 capsule BID (not 1 capsule QD as per OMR/admission med rec) – OMR updated. She notes patient will follow up with urologist for further monitoring/management.     In addition, patient was prescribed lacosamide 50 mg tablet – 1 tablet BID upon discharge; however, patient’s wife reports patient is not taking lacosamide and is holding off on it at this time – she notes she spoke with patient’s neurologist, Dr. Ortega, today 5/8/23 who is aware patient has not been taking it (as documented in outpatient EHR 5/8/23) and she states neurologist is ok with patient holding off on lacosamide as patient’s wife notes unsure if patient’s symptoms were due to seizures and she is reluctant to give patient additional medication that will cause dizziness/drowsiness although aware of risk vs. benefit. She notes patient’s neurologist will be referring him to additional neurologist who specializes in seizures for further follow up and discussion.    Additionally, prescription for aspirin 81 mg QD sent to [facility] and picked up per patient’s wife although aspirin not included on discharge paperwork and per chart, patient has declined aspirin. Patient’s wife notes patient is not taking the aspirin although picked up as patient does not want to risk any bleeding with aspirin given hx of hemorrhagic cystitis as noted per chart as well. No additional questions/concerns at this time.

## 2023-05-09 PROBLEM — K62.7 RADIATION PROCTITIS: Chronic | Status: ACTIVE | Noted: 2023-05-03

## 2023-05-09 PROBLEM — C61 MALIGNANT NEOPLASM OF PROSTATE: Chronic | Status: ACTIVE | Noted: 2023-05-03

## 2023-05-09 PROBLEM — G20 PARKINSON'S DISEASE: Chronic | Status: ACTIVE | Noted: 2023-05-03

## 2023-05-23 ENCOUNTER — APPOINTMENT (OUTPATIENT)
Dept: NEUROLOGY | Facility: CLINIC | Age: 78
End: 2023-05-23
Payer: MEDICARE

## 2023-05-23 VITALS
OXYGEN SATURATION: 90 % | WEIGHT: 180 LBS | HEIGHT: 69 IN | HEART RATE: 70 BPM | DIASTOLIC BLOOD PRESSURE: 65 MMHG | SYSTOLIC BLOOD PRESSURE: 104 MMHG | BODY MASS INDEX: 26.66 KG/M2

## 2023-05-23 PROCEDURE — 99214 OFFICE O/P EST MOD 30 MIN: CPT

## 2023-05-23 NOTE — PHYSICAL EXAM
[FreeTextEntry1] : Awake, alert, oriented x 3.  Language fluent.  Comprehension intact.  Naming intact.  Repetition intact.  Affect restricted.  Cranial nerves: reduced eye blink rate, otherwise grossly intact.  Motor exam: normal bulk, mildly increased tone in limbs, no resting tremor, 5/5 in all four extremities, slow YUMIKO in hands b/l.  Sensory exam: intact to LT.  DTRs: 1+ throughout, flexor plantar response bilaterally, no clonus.  Coordination: no dysmetria.  Gait: using cane, narrow base, short stride length. \par \par \par

## 2023-05-23 NOTE — ASSESSMENT
[FreeTextEntry1] : 78 yo man recently admitted to hospital for transient recurrent episodes of impaired speech, stuttering speech.  Patient denies any loss of awareness with these episodes.  EEG was non-diagnostic.  No recurrence of symptoms since discharge from hospital\par \par Family prefers to avoid anti-epileptic medication unless diagnosis is confirmed.\par \par Plan:\par 1. If symptoms recur, patient agrees to diagnostic video EEG monitoring in EMU at [facility]\par 2. No clear indication for an AED at this time.\par 3. Seizure precautions, including no driving or operating heavy equipment, no unsupervised swimming, using a shower instead of a bathtub, no climbing ladders or working at elevations, no use of sharp dangerous tools or devices.\par 4. Patient agrees with plan.\par 5. Follow up prn and with Dr Ortega as scheduled.\par \par Seamus Desir MD\par [facility]\par \par Greater than 50% of the encounter was spent on counseling and coordination of care discussing differential diagnosis, diagnostic testing, and treatment options. We have talked about appropriate follow up, and I have spent 45 minutes of face to face time with the patient.\par

## 2023-05-23 NOTE — REASON FOR VISIT
Physical Therapy Rehab Treatment Note  Fac[facility]/Department[facility]  [facility]       NAME: Merritt Hinkle  : 1973 (49 y.o.)  MRN: [id number]  CODE STATUS: Full Code    Date of Service: 2021  Chart Reviewed: Yes  Patient assessed for rehabilitation services?: Yes  Family / Caregiver Present: No  Diagnosis: Abnormality of gait and mobility due to recent infarct left thalamus. [facility] admit 20. Restrictions:  Restrictions/Precautions: Fall Risk  Required Braces or Orthoses  Right Upper Extremity Brace/Splint: Sling  Right Upper Extremity Brace/Splint: RUE immobilizer in place - pt reports rotator cuff repair done Dec 1       SUBJECTIVE: Subjective: I am doing better than yesterday  Response To Previous Treatment: Patient with no complaints from previous session.   Pain Screening  Patient Currently in Pain: Yes  Pre Treatment Pain Screening  Pain at present: 5  Scale Used: Numeric Score  Intervention List: Patient able to continue with treatment    Post Treatment Pain Screening:  Pain Assessment  Pain Assessment: 0-10  Pain Level: 5  Pain Type: Acute pain  Pain Location: Generalized  Pain Descriptors: Aching  Clinical Progression: Not changed    OBJECTIVE:   Follows Commands: Within Functional Limits    Outcomes Measures:  Dynamic Gait Total Score: 15     Bed mobility  Rolling to Left: Modified independent  Rolling to Right: Unable to assess  Supine to Sit: Modified independent  Sit to Supine: Modified independent  Scooting: Modified independent    Transfers  Sit to Stand: Supervision;Modified independent  Stand to sit: Supervision;Modified independent  Bed to Chair: Supervision    Ambulation  Ambulation?: Yes  More Ambulation?: No  Ambulation 1  Surface: level tile  Device: No Device  Assistance: Supervision  Quality of Gait: Lateral excursion reciprocal pattern  Distance: 150'    Stairs/Curb  Stairs?: No    ASSESSMENT/COMMENTS:  Body structures, Functions, Activity limitations: Decreased functional mobility ; Decreased strength;Decreased endurance;Decreased coordination;Decreased ADL status; Decreased safe awareness;Decreased sensation;Decreased cognition;Decreased balance; Increased pain  Assessment: Patient scores 15 on DGI showing limitations with head turns and direction changes. Patient continues to show good safety with transfers and bed mobility    PLAN OF CARE/Safety:   Safety Devices  Type of devices:  All fall risk precautions in place      Therapy Time:   Individual   Time In 0900   Time Out 0930   Minutes 30     Minutes: 30      Transfer/Bed mobility trainin      Gait training: 10      Neuro re education: [address], [place], 21 at 12:12 PM [Initial Evaluation] : an initial evaluation

## 2023-05-23 NOTE — HISTORY OF PRESENT ILLNESS
[FreeTextEntry1] : 76 yo man, patient of Dr Ortega, with essential tremor and parkinsonism.\par \par He was recently admitted to [facility] for episodes of slurred speech, which were thought by the neurology team to possibly be focal seizures, so he was started on LCM 50mg BID.   Video EEG monitoring done in the hospital was non-diagnostic, however, typical episodes were not recorded.   The episodes were witnessed by the neurology team and were described as patient becoming unresponsive with eyes open, drooling, followed by resolution of symptoms and return to baseline status.\par \par His son brings in a video of a typical episode, which shows patient stuttering, difficulty getting words out.  Patient reports that he is fully conscious during these episodes, no associated disorientation or confusion, but just has trouble getting words out, and feels lightheaded.  He does have some orthostatic hypotension, likely related to his parkinsonism.\par \par Family decided not to take Lacosamide upon discharge.  Interestingly, he has not had any further episodes since discharge, and is feeling well, at baseline.   He prefers to defer any additional testing at this time.  He is not driving.\par

## 2023-06-19 ENCOUNTER — APPOINTMENT (OUTPATIENT)
Dept: UROLOGY | Facility: CLINIC | Age: 78
End: 2023-06-19
Payer: MEDICARE

## 2023-06-19 VITALS
DIASTOLIC BLOOD PRESSURE: 72 MMHG | HEIGHT: 69 IN | WEIGHT: 173 LBS | SYSTOLIC BLOOD PRESSURE: 108 MMHG | RESPIRATION RATE: 16 BRPM | HEART RATE: 69 BPM | BODY MASS INDEX: 25.62 KG/M2 | TEMPERATURE: 98 F

## 2023-06-19 PROCEDURE — 99205 OFFICE O/P NEW HI 60 MIN: CPT

## 2023-06-19 RX ORDER — PENTOSAN POLYSULFATE SODIUM 100 MG/1
100 CAPSULE, GELATIN COATED ORAL
Qty: 30 | Refills: 11 | Status: ACTIVE | COMMUNITY
Start: 2022-07-26 | End: 1900-01-01

## 2023-06-20 NOTE — HISTORY OF PRESENT ILLNESS
[FreeTextEntry1] : 06/19/2023: Mr. MENDOZA is a 77 year old male with history of Parkinson's disease s/p radiation therapy for prostate cancer. He is accompanied today by his  wife. His wife reports the patient has history of colon cancer treated with ileostomy and pmhx of prostate cancer treated with radiation and androgen deprivation therapy. The patient then developed radiation cystitis for which he is currently is taking Elmiron twice a day. His wife is worried about the side effects of Elmiron notably blindness. He currently has cataract with has deteriorated with age. I explain to the patient that Elmiron, while decreasing the chance of a bleeding event, will not sop his because of his radiation cystitis. I explain it is safe to sop Elmiron or reduce the dose to one daily. He denies gross hematuria since hyperbaric treatment  a year ago. His most recent PSA is 0.05 ng/mL in April 2023. His  AUA score is 6/35. He reports intermittent urinary urgency but denies urge incontinence. He complains of vertigo. \par \par ASSESSMENT: POrostate cancer QUE after radiation, history fo recurrent radiation cystitis, Parkinsons urgency and frequency \par \par PLAN:  Pt will og to the ER if he experiences gross hematuria with clots. Renewal of Elmiron 100 MG one tablet daily.   Follow up in one year with PSA before visit. no need for blkadder evaluation or intervention at this time. \par \par I counseled the patient. I discussed the various etiologies of his symptoms. Risks and alternatives were discussed. I answered the patient questions. The patient will follow-up as directed and will contact me with any questions or concerns. Thank you for the opportunity to participate in the care of this patient. I'll keep you updated on his progress.

## 2023-06-20 NOTE — ADDENDUM
[FreeTextEntry1] : This note was authored by Terrance Curry working as a scribe for Dr. Juan Arnold. I, Dr. Juan Arnold have reviewed the content of this note and confirm it is true and accurate. I personally performed the history and physical examination and made all the decisions. 06/19/2023

## 2023-09-11 ENCOUNTER — APPOINTMENT (OUTPATIENT)
Dept: CARDIOLOGY | Facility: CLINIC | Age: 78
End: 2023-09-11
Payer: MEDICARE

## 2023-09-11 ENCOUNTER — NON-APPOINTMENT (OUTPATIENT)
Age: 78
End: 2023-09-11

## 2023-09-11 VITALS
HEIGHT: 69 IN | WEIGHT: 178 LBS | HEART RATE: 71 BPM | BODY MASS INDEX: 26.36 KG/M2 | SYSTOLIC BLOOD PRESSURE: 96 MMHG | DIASTOLIC BLOOD PRESSURE: 66 MMHG | OXYGEN SATURATION: 97 %

## 2023-09-11 DIAGNOSIS — R94.31 ABNORMAL ELECTROCARDIOGRAM [ECG] [EKG]: ICD-10-CM

## 2023-09-11 PROCEDURE — 93000 ELECTROCARDIOGRAM COMPLETE: CPT

## 2023-09-11 PROCEDURE — 99203 OFFICE O/P NEW LOW 30 MIN: CPT

## 2023-09-11 RX ORDER — ATORVASTATIN CALCIUM 40 MG/1
40 TABLET, FILM COATED ORAL DAILY
Qty: 90 | Refills: 3 | Status: DISCONTINUED | COMMUNITY
Start: 2023-05-31 | End: 2023-06-01

## 2023-09-20 ENCOUNTER — APPOINTMENT (OUTPATIENT)
Dept: NEUROLOGY | Facility: CLINIC | Age: 78
End: 2023-09-20
Payer: MEDICARE

## 2023-09-20 DIAGNOSIS — G25.0 ESSENTIAL TREMOR: ICD-10-CM

## 2023-09-20 PROCEDURE — 99214 OFFICE O/P EST MOD 30 MIN: CPT

## 2023-11-10 ENCOUNTER — RX RENEWAL (OUTPATIENT)
Age: 78
End: 2023-11-10

## 2024-01-31 ENCOUNTER — APPOINTMENT (OUTPATIENT)
Dept: UROLOGY | Facility: CLINIC | Age: 79
End: 2024-01-31
Payer: MEDICARE

## 2024-01-31 VITALS
OXYGEN SATURATION: 97 % | RESPIRATION RATE: 16 BRPM | DIASTOLIC BLOOD PRESSURE: 60 MMHG | SYSTOLIC BLOOD PRESSURE: 92 MMHG | WEIGHT: 178 LBS | BODY MASS INDEX: 26.36 KG/M2 | HEIGHT: 69 IN | TEMPERATURE: 97.6 F | HEART RATE: 71 BPM

## 2024-01-31 DIAGNOSIS — N30.40 IRRADIATION CYSTITIS W/OUT HEMATURIA: ICD-10-CM

## 2024-01-31 DIAGNOSIS — Z87.448 PERSONAL HISTORY OF OTHER DISEASES OF URINARY SYSTEM: ICD-10-CM

## 2024-01-31 PROCEDURE — 99214 OFFICE O/P EST MOD 30 MIN: CPT

## 2024-01-31 NOTE — ASSESSMENT
[FreeTextEntry1] : 1- check urine  2- MRI urgram- hesitant to CT due to numerous studoes and XRT for pca and colon ca  3- cysto after above

## 2024-01-31 NOTE — PHYSICAL EXAM
[Normal Appearance] : normal appearance [Well Groomed] : well groomed [General Appearance - In No Acute Distress] : no acute distress [Edema] : no peripheral edema [Respiration, Rhythm And Depth] : normal respiratory rhythm and effort [Exaggerated Use Of Accessory Muscles For Inspiration] : no accessory muscle use [Abdomen Soft] : soft [Abdomen Tenderness] : non-tender [Costovertebral Angle Tenderness] : no ~M costovertebral angle tenderness [Normal Station and Gait] : the gait and station were normal for the patient's age [] : no rash [No Focal Deficits] : no focal deficits [Oriented To Time, Place, And Person] : oriented to person, place, and time [Affect] : the affect was normal [Mood] : the mood was normal [No Palpable Adenopathy] : no palpable adenopathy [de-identified] : pvr- zero

## 2024-01-31 NOTE — HISTORY OF PRESENT ILLNESS
[FreeTextEntry1] : here with wife and son hx of colon can and pca s/p RT and ADT  on Elmiron for radiation cystitis but resolved with hyperbacric oxygen last seen by dr Arnold June 2023  patient with hematuria since this week-3 x in one day  no pain with void  c/o frequency, nocturia 1-2  good flow, feels empty after void  admits to dec wateri intake  no hx of renal stones  bowel habits --has ostomy weight stable  here for further eval:

## 2024-02-01 ENCOUNTER — RESULT REVIEW (OUTPATIENT)
Age: 79
End: 2024-02-01

## 2024-02-01 LAB
APPEARANCE: CLEAR
BACTERIA: NEGATIVE /HPF
BILIRUBIN URINE: NEGATIVE
BLOOD URINE: NEGATIVE
CAST: 0 /LPF
COLOR: YELLOW
EPITHELIAL CELLS: 0 /HPF
GLUCOSE QUALITATIVE U: NEGATIVE MG/DL
KETONES URINE: ABNORMAL MG/DL
LEUKOCYTE ESTERASE URINE: NEGATIVE
MICROSCOPIC-UA: NORMAL
NITRITE URINE: NEGATIVE
PH URINE: 6.5
PROTEIN URINE: NORMAL MG/DL
RED BLOOD CELLS URINE: 16 /HPF
REVIEW: NORMAL
SPECIFIC GRAVITY URINE: 1.02
UROBILINOGEN URINE: 1 MG/DL
WHITE BLOOD CELLS URINE: 1 /HPF

## 2024-02-02 LAB
BACTERIA UR CULT: NORMAL
URINE CYTOLOGY: NORMAL

## 2024-02-10 ENCOUNTER — OUTPATIENT (OUTPATIENT)
Dept: OUTPATIENT SERVICES | Facility: HOSPITAL | Age: 79
LOS: 1 days | End: 2024-02-10
Payer: MEDICARE

## 2024-02-10 ENCOUNTER — APPOINTMENT (OUTPATIENT)
Dept: MRI IMAGING | Facility: CLINIC | Age: 79
End: 2024-02-10
Payer: MEDICARE

## 2024-02-10 DIAGNOSIS — Z87.448 PERSONAL HISTORY OF OTHER DISEASES OF URINARY SYSTEM: ICD-10-CM

## 2024-02-10 DIAGNOSIS — Z90.49 ACQUIRED ABSENCE OF OTHER SPECIFIED PARTS OF DIGESTIVE TRACT: Chronic | ICD-10-CM

## 2024-02-10 PROCEDURE — A9585: CPT

## 2024-02-10 PROCEDURE — 74183 MRI ABD W/O CNTR FLWD CNTR: CPT

## 2024-02-10 PROCEDURE — 74183 MRI ABD W/O CNTR FLWD CNTR: CPT | Mod: 26,MH

## 2024-02-10 PROCEDURE — 72197 MRI PELVIS W/O & W/DYE: CPT | Mod: 26,MH

## 2024-02-10 PROCEDURE — 72197 MRI PELVIS W/O & W/DYE: CPT

## 2024-02-13 ENCOUNTER — APPOINTMENT (OUTPATIENT)
Dept: UROLOGY | Facility: CLINIC | Age: 79
End: 2024-02-13

## 2024-02-26 ENCOUNTER — TRANSCRIPTION ENCOUNTER (OUTPATIENT)
Age: 79
End: 2024-02-26

## 2024-02-27 ENCOUNTER — RESULT REVIEW (OUTPATIENT)
Age: 79
End: 2024-02-27

## 2024-02-27 ENCOUNTER — INPATIENT (INPATIENT)
Facility: HOSPITAL | Age: 79
LOS: 2 days | Discharge: NURSING, REHAB OR LONG-TERM CARE | DRG: 522 | End: 2024-03-01
Attending: HOSPITALIST | Admitting: HOSPITALIST
Payer: MEDICARE

## 2024-02-27 VITALS
DIASTOLIC BLOOD PRESSURE: 66 MMHG | SYSTOLIC BLOOD PRESSURE: 105 MMHG | TEMPERATURE: 97 F | WEIGHT: 184.97 LBS | HEART RATE: 75 BPM | HEIGHT: 72 IN | RESPIRATION RATE: 19 BRPM | OXYGEN SATURATION: 95 %

## 2024-02-27 DIAGNOSIS — S72.002A FRACTURE OF UNSPECIFIED PART OF NECK OF LEFT FEMUR, INITIAL ENCOUNTER FOR CLOSED FRACTURE: ICD-10-CM

## 2024-02-27 DIAGNOSIS — Z90.49 ACQUIRED ABSENCE OF OTHER SPECIFIED PARTS OF DIGESTIVE TRACT: Chronic | ICD-10-CM

## 2024-02-27 LAB
ABO RH CONFIRMATION: SIGNIFICANT CHANGE UP
ALBUMIN SERPL ELPH-MCNC: 3.5 G/DL — SIGNIFICANT CHANGE UP (ref 3.3–5)
ALP SERPL-CCNC: 60 U/L — SIGNIFICANT CHANGE UP (ref 40–120)
ALT FLD-CCNC: 7 U/L — LOW (ref 10–45)
ANION GAP SERPL CALC-SCNC: 9 MMOL/L — SIGNIFICANT CHANGE UP (ref 5–17)
AST SERPL-CCNC: 21 U/L — SIGNIFICANT CHANGE UP (ref 10–40)
BASOPHILS # BLD AUTO: 0.03 K/UL — SIGNIFICANT CHANGE UP (ref 0–0.2)
BASOPHILS NFR BLD AUTO: 0.4 % — SIGNIFICANT CHANGE UP (ref 0–2)
BILIRUB SERPL-MCNC: 0.7 MG/DL — SIGNIFICANT CHANGE UP (ref 0.2–1.2)
BLD GP AB SCN SERPL QL: SIGNIFICANT CHANGE UP
BUN SERPL-MCNC: 25 MG/DL — HIGH (ref 7–23)
CALCIUM SERPL-MCNC: 9.3 MG/DL — SIGNIFICANT CHANGE UP (ref 8.4–10.5)
CHLORIDE SERPL-SCNC: 107 MMOL/L — SIGNIFICANT CHANGE UP (ref 96–108)
CO2 SERPL-SCNC: 28 MMOL/L — SIGNIFICANT CHANGE UP (ref 22–31)
CREAT SERPL-MCNC: 1.16 MG/DL — SIGNIFICANT CHANGE UP (ref 0.5–1.3)
EGFR: 64 ML/MIN/1.73M2 — SIGNIFICANT CHANGE UP
EOSINOPHIL # BLD AUTO: 0.37 K/UL — SIGNIFICANT CHANGE UP (ref 0–0.5)
EOSINOPHIL NFR BLD AUTO: 4.7 % — SIGNIFICANT CHANGE UP (ref 0–6)
GLUCOSE SERPL-MCNC: 127 MG/DL — HIGH (ref 70–99)
HCT VFR BLD CALC: 41.7 % — SIGNIFICANT CHANGE UP (ref 39–50)
HGB BLD-MCNC: 13.4 G/DL — SIGNIFICANT CHANGE UP (ref 13–17)
IMM GRANULOCYTES NFR BLD AUTO: 0.6 % — SIGNIFICANT CHANGE UP (ref 0–0.9)
INR BLD: 1.1 RATIO — SIGNIFICANT CHANGE UP (ref 0.85–1.18)
LYMPHOCYTES # BLD AUTO: 0.61 K/UL — LOW (ref 1–3.3)
LYMPHOCYTES # BLD AUTO: 7.7 % — LOW (ref 13–44)
MCHC RBC-ENTMCNC: 28.7 PG — SIGNIFICANT CHANGE UP (ref 27–34)
MCHC RBC-ENTMCNC: 32.1 GM/DL — SIGNIFICANT CHANGE UP (ref 32–36)
MCV RBC AUTO: 89.3 FL — SIGNIFICANT CHANGE UP (ref 80–100)
MONOCYTES # BLD AUTO: 0.61 K/UL — SIGNIFICANT CHANGE UP (ref 0–0.9)
MONOCYTES NFR BLD AUTO: 7.7 % — SIGNIFICANT CHANGE UP (ref 2–14)
NEUTROPHILS # BLD AUTO: 6.28 K/UL — SIGNIFICANT CHANGE UP (ref 1.8–7.4)
NEUTROPHILS NFR BLD AUTO: 78.9 % — HIGH (ref 43–77)
NRBC # BLD: 0 /100 WBCS — SIGNIFICANT CHANGE UP (ref 0–0)
PLATELET # BLD AUTO: 191 K/UL — SIGNIFICANT CHANGE UP (ref 150–400)
POTASSIUM SERPL-MCNC: 3.9 MMOL/L — SIGNIFICANT CHANGE UP (ref 3.5–5.3)
POTASSIUM SERPL-SCNC: 3.9 MMOL/L — SIGNIFICANT CHANGE UP (ref 3.5–5.3)
PROT SERPL-MCNC: 7.2 G/DL — SIGNIFICANT CHANGE UP (ref 6–8.3)
PROTHROM AB SERPL-ACNC: 12.8 SEC — SIGNIFICANT CHANGE UP (ref 9.5–13)
RBC # BLD: 4.67 M/UL — SIGNIFICANT CHANGE UP (ref 4.2–5.8)
RBC # FLD: 14.3 % — SIGNIFICANT CHANGE UP (ref 10.3–14.5)
SODIUM SERPL-SCNC: 144 MMOL/L — SIGNIFICANT CHANGE UP (ref 135–145)
TROPONIN I, HIGH SENSITIVITY RESULT: 24.6 NG/L — SIGNIFICANT CHANGE UP
WBC # BLD: 7.95 K/UL — SIGNIFICANT CHANGE UP (ref 3.8–10.5)
WBC # FLD AUTO: 7.95 K/UL — SIGNIFICANT CHANGE UP (ref 3.8–10.5)

## 2024-02-27 PROCEDURE — 99285 EMERGENCY DEPT VISIT HI MDM: CPT

## 2024-02-27 PROCEDURE — 88311 DECALCIFY TISSUE: CPT | Mod: 26

## 2024-02-27 PROCEDURE — 27130 TOTAL HIP ARTHROPLASTY: CPT | Mod: AS,LT

## 2024-02-27 PROCEDURE — 99223 1ST HOSP IP/OBS HIGH 75: CPT

## 2024-02-27 PROCEDURE — 73501 X-RAY EXAM HIP UNI 1 VIEW: CPT | Mod: 26,59,LT

## 2024-02-27 PROCEDURE — 73502 X-RAY EXAM HIP UNI 2-3 VIEWS: CPT | Mod: 26,LT

## 2024-02-27 PROCEDURE — 99222 1ST HOSP IP/OBS MODERATE 55: CPT

## 2024-02-27 PROCEDURE — 72192 CT PELVIS W/O DYE: CPT | Mod: 26,MC

## 2024-02-27 PROCEDURE — 93306 TTE W/DOPPLER COMPLETE: CPT | Mod: 26

## 2024-02-27 PROCEDURE — 93010 ELECTROCARDIOGRAM REPORT: CPT

## 2024-02-27 PROCEDURE — 71045 X-RAY EXAM CHEST 1 VIEW: CPT | Mod: 26

## 2024-02-27 PROCEDURE — 88305 TISSUE EXAM BY PATHOLOGIST: CPT | Mod: 26

## 2024-02-27 DEVICE — SHELL ACET G7 OSSEOTI F HEMISPHR 4 HOLE 56MM: Type: IMPLANTABLE DEVICE | Site: LEFT | Status: FUNCTIONAL

## 2024-02-27 DEVICE — IMPLANTABLE DEVICE: Type: IMPLANTABLE DEVICE | Site: LEFT | Status: FUNCTIONAL

## 2024-02-27 DEVICE — CNTRLZR DST 12MM PMMA: Type: IMPLANTABLE DEVICE | Site: LEFT | Status: FUNCTIONAL

## 2024-02-27 DEVICE — LINER ACET VIT E G7 HI WALL SZ F 40MM: Type: IMPLANTABLE DEVICE | Site: LEFT | Status: FUNCTIONAL

## 2024-02-27 DEVICE — HEAD FEM 40MM -3.5MMBIOLOX DELTA CERAMIC: Type: IMPLANTABLE DEVICE | Site: LEFT | Status: FUNCTIONAL

## 2024-02-27 DEVICE — CEMENT SIMPLEX P 40GM: Type: IMPLANTABLE DEVICE | Site: LEFT | Status: FUNCTIONAL

## 2024-02-27 RX ORDER — CARBIDOPA AND LEVODOPA 25; 100 MG/1; MG/1
1 TABLET ORAL THREE TIMES A DAY
Refills: 0 | Status: DISCONTINUED | OUTPATIENT
Start: 2024-02-27 | End: 2024-02-27

## 2024-02-27 RX ORDER — HYDROMORPHONE HYDROCHLORIDE 2 MG/ML
0.25 INJECTION INTRAMUSCULAR; INTRAVENOUS; SUBCUTANEOUS
Refills: 0 | Status: DISCONTINUED | OUTPATIENT
Start: 2024-02-27 | End: 2024-02-27

## 2024-02-27 RX ORDER — PENTOSAN POLYSULFATE SODIUM 100 MG
1 CAPSULE ORAL
Refills: 0 | DISCHARGE

## 2024-02-27 RX ORDER — ASPIRIN/CALCIUM CARB/MAGNESIUM 324 MG
81 TABLET ORAL EVERY 12 HOURS
Refills: 0 | Status: DISCONTINUED | OUTPATIENT
Start: 2024-02-27 | End: 2024-02-27

## 2024-02-27 RX ORDER — ONDANSETRON 8 MG/1
4 TABLET, FILM COATED ORAL EVERY 8 HOURS
Refills: 0 | Status: DISCONTINUED | OUTPATIENT
Start: 2024-02-27 | End: 2024-03-01

## 2024-02-27 RX ORDER — MORPHINE SULFATE 50 MG/1
4 CAPSULE, EXTENDED RELEASE ORAL EVERY 4 HOURS
Refills: 0 | Status: DISCONTINUED | OUTPATIENT
Start: 2024-02-27 | End: 2024-02-27

## 2024-02-27 RX ORDER — ACETAMINOPHEN 500 MG
650 TABLET ORAL EVERY 6 HOURS
Refills: 0 | Status: DISCONTINUED | OUTPATIENT
Start: 2024-02-27 | End: 2024-02-27

## 2024-02-27 RX ORDER — CEFAZOLIN SODIUM 1 G
2000 VIAL (EA) INJECTION EVERY 8 HOURS
Refills: 0 | Status: COMPLETED | OUTPATIENT
Start: 2024-02-28 | End: 2024-02-28

## 2024-02-27 RX ORDER — CHLORHEXIDINE GLUCONATE 213 G/1000ML
1 SOLUTION TOPICAL ONCE
Refills: 0 | Status: DISCONTINUED | OUTPATIENT
Start: 2024-02-27 | End: 2024-02-27

## 2024-02-27 RX ORDER — LANOLIN ALCOHOL/MO/W.PET/CERES
3 CREAM (GRAM) TOPICAL AT BEDTIME
Refills: 0 | Status: DISCONTINUED | OUTPATIENT
Start: 2024-02-27 | End: 2024-02-27

## 2024-02-27 RX ORDER — ASPIRIN/CALCIUM CARB/MAGNESIUM 324 MG
81 TABLET ORAL EVERY 12 HOURS
Refills: 0 | Status: DISCONTINUED | OUTPATIENT
Start: 2024-02-28 | End: 2024-02-29

## 2024-02-27 RX ORDER — SODIUM CHLORIDE 9 MG/ML
1000 INJECTION, SOLUTION INTRAVENOUS
Refills: 0 | Status: DISCONTINUED | OUTPATIENT
Start: 2024-02-27 | End: 2024-02-27

## 2024-02-27 RX ORDER — OXYCODONE HYDROCHLORIDE 5 MG/1
5 TABLET ORAL EVERY 6 HOURS
Refills: 0 | Status: DISCONTINUED | OUTPATIENT
Start: 2024-02-27 | End: 2024-02-29

## 2024-02-27 RX ORDER — ONDANSETRON 8 MG/1
4 TABLET, FILM COATED ORAL EVERY 8 HOURS
Refills: 0 | Status: DISCONTINUED | OUTPATIENT
Start: 2024-02-27 | End: 2024-02-27

## 2024-02-27 RX ORDER — ONDANSETRON 8 MG/1
4 TABLET, FILM COATED ORAL ONCE
Refills: 0 | Status: DISCONTINUED | OUTPATIENT
Start: 2024-02-27 | End: 2024-02-27

## 2024-02-27 RX ORDER — CARBIDOPA AND LEVODOPA 25; 100 MG/1; MG/1
1 TABLET ORAL THREE TIMES A DAY
Refills: 0 | Status: DISCONTINUED | OUTPATIENT
Start: 2024-02-27 | End: 2024-03-01

## 2024-02-27 RX ORDER — SODIUM CHLORIDE 9 MG/ML
1000 INJECTION, SOLUTION INTRAVENOUS
Refills: 0 | Status: DISCONTINUED | OUTPATIENT
Start: 2024-02-27 | End: 2024-02-28

## 2024-02-27 RX ORDER — MORPHINE SULFATE 50 MG/1
2 CAPSULE, EXTENDED RELEASE ORAL EVERY 6 HOURS
Refills: 0 | Status: DISCONTINUED | OUTPATIENT
Start: 2024-02-27 | End: 2024-02-29

## 2024-02-27 RX ORDER — ACETAMINOPHEN 500 MG
650 TABLET ORAL EVERY 6 HOURS
Refills: 0 | Status: DISCONTINUED | OUTPATIENT
Start: 2024-02-27 | End: 2024-02-29

## 2024-02-27 RX ADMIN — MORPHINE SULFATE 4 MILLIGRAM(S): 50 CAPSULE, EXTENDED RELEASE ORAL at 18:23

## 2024-02-27 RX ADMIN — SODIUM CHLORIDE 100 MILLILITER(S): 9 INJECTION, SOLUTION INTRAVENOUS at 18:45

## 2024-02-27 RX ADMIN — MORPHINE SULFATE 4 MILLIGRAM(S): 50 CAPSULE, EXTENDED RELEASE ORAL at 15:15

## 2024-02-27 NOTE — ED ADULT TRIAGE NOTE - CHIEF COMPLAINT QUOTE
Patient BIB wife for trip and fall on Saturday with left hip pain. Patient with a hx of parkinson's disease. Patient denies head strike, LOC and anticoagulation use.

## 2024-02-27 NOTE — CONSULT NOTE ADULT - ASSESSMENT
Assessment:  Kaiden Rodriguez is a 78 year old man with past medical history of Parkinsons Disease with gait instability and falls, CVA, Colon cancer (s/p ostomy) and Prostate cancer (s/p radiation) presented after mechanical fall with resultant left femoral neck fracture.     Cardiology consulted for pre-operative cardiac risk stratification prior to left femoral neck fracture repair. ECG consistent with sinus rhythm and incomplete RBBB, no acute ischemic ST abnormalities and troponin negative. The patient has a limited exercise capacity due to Parkinsons, but denies any recent exertional angina or dyspnea. There are no signs of CHF on exam. There are no malignant arrhythmias on telemetry monitoring.    Recommendations:  [] Pre-operative cardiac risk stratification; The patient is at moderate cardiovascular risk prior to moderate risk surgery; left femoral neck fracture repair, may proceed at this level of risk.    We will continue to follow along.    Marylou Fitzpatrick MD  Cardiology

## 2024-02-27 NOTE — H&P ADULT - NSHPPHYSICALEXAM_GEN_ALL_CORE
Vital Signs Last 24 Hrs  T(C): 36.2 (27 Feb 2024 11:56), Max: 36.2 (27 Feb 2024 11:56)  T(F): 97.2 (27 Feb 2024 11:56), Max: 97.2 (27 Feb 2024 11:56)  HR: 73 (27 Feb 2024 12:53) (73 - 75)  BP: 116/75 (27 Feb 2024 12:53) (105/66 - 116/75)  BP(mean): --  RR: 15 (27 Feb 2024 12:53) (15 - 19)  SpO2: 100% (27 Feb 2024 12:53) (95% - 100%)    Parameters below as of 27 Feb 2024 12:53  Patient On (Oxygen Delivery Method): room air      GENERAL- NAD  EAR/NOSE/MOUTH/THROAT -   MMM  EYES- CAN, conjunctiva and Sclera clear  NECK- supple  RESPIRATORY-  clear to auscultation bilaterally, non laboured breathing  CARDIOVASCULAR - SIS2, RRR  GI - soft NT BS present  EXTREMITIES- no pedal edema  NEUROLOGY- no gross focal deficits, LLE weakness due to left hip fracture   PSYCHIATRY- AAO X 3  MUSCULOSKELETAL- ROM  restricted to LLE

## 2024-02-27 NOTE — PROGRESS NOTE ADULT - SUBJECTIVE AND OBJECTIVE BOX
Initial Eval Note:    78y Male admitted POD #0 from left total hip arthroplasty        Vital Signs Last 24 Hrs  T(C): 36.2 (27 Feb 2024 21:43), Max: 36.9 (27 Feb 2024 17:00)  T(F): 97.2 (27 Feb 2024 21:43), Max: 98.4 (27 Feb 2024 17:00)  HR: 81 (27 Feb 2024 22:05) (65 - 83)  BP: 137/72 (27 Feb 2024 22:05) (105/66 - 185/89)  BP(mean): 115 (27 Feb 2024 15:11) (115 - 115)  RR: 14 (27 Feb 2024 22:05) (13 - 19)  SpO2: 97% (27 Feb 2024 22:05) (94% - 100%)    Parameters below as of 27 Feb 2024 21:43  Patient On (Oxygen Delivery Method): nasal cannula  O2 Flow (L/min): 2                              13.4   7.95  )-----------( 191      ( 27 Feb 2024 12:43 )             41.7         02-27    144  |  107  |  25<H>  ----------------------------<  127<H>  3.9   |  28  |  1.16    Ca    9.3      27 Feb 2024 12:43    TPro  7.2  /  Alb  3.5  /  TBili  0.7  /  DBili  x   /  AST  21  /  ALT  7<L>  /  AlkPhos  60  02-27        NEURO: no headaches, blurry vision, tremors, depression, anxity  CV: no chest pain, palpitations, murmurs, orthopnea  Resp: no shortness of breath, cough, wheeze, sputum production  GI: no stomach pain,nausea, vomitting, flatulence, hematemesis, hematochezia  PV: no swelling of extremities, no hair loss, no coolness to extremities  ENDO: no polydypsia, polyphagia, polyuria, weight loss, night sweats          NEURO: awake and alert  CV: (+) S1/S2, rrr, no mrg  RESP: CTA b/l  GI: soft, non tender

## 2024-02-27 NOTE — CONSULT NOTE ADULT - SUBJECTIVE AND OBJECTIVE BOX
KAIDEN MENDOZA  [id number]      HPI:    Kaiden Mendoza is a 78 year old man with past medical history of Parkinsons Disease with gait instability and falls, CVA, Colon cancer (s/p ostomy) and Prostate cancer (s/p radiation) presented after mechanical fall with resultant left femoral neck fracture.     ALLERGIES:  Allergy Status Unknown      CURRENT MEDICATIONS:  acetaminophen     Tablet .. 650 milliGRAM(s) Oral every 6 hours PRN  aluminum hydroxide/magnesium hydroxide/simethicone Suspension 30 milliLiter(s) Oral every 4 hours PRN  carbidopa/levodopa  25/100 1 Tablet(s) Oral three times a day  chlorhexidine 4% Liquid 1 Application(s) Topical once  dextrose 5% + sodium chloride 0.45%. 1000 milliLiter(s) IV Continuous <Continuous>  melatonin 3 milliGRAM(s) Oral at bedtime PRN  morphine  - Injectable 4 milliGRAM(s) IV Push every 4 hours PRN  ondansetron Injectable 4 milliGRAM(s) IV Push every 8 hours PRN    ROS:  All 10 systems reviewed and positives noted in HPI    OBJECTIVE:    VITAL SIGNS:  Vital Signs Last 24 Hrs  T(C): 36.3 (27 Feb 2024 15:11), Max: 36.3 (27 Feb 2024 15:11)  T(F): 97.4 (27 Feb 2024 15:11), Max: 97.4 (27 Feb 2024 15:11)  HR: 77 (27 Feb 2024 15:11) (73 - 77)  BP: 185/89 (27 Feb 2024 15:11) (105/66 - 185/89)  BP(mean): 115 (27 Feb 2024 15:11) (115 - 115)  RR: 17 (27 Feb 2024 15:11) (15 - 19)  SpO2: 97% (27 Feb 2024 15:11) (95% - 100%)    Parameters below as of 27 Feb 2024 15:11  Patient On (Oxygen Delivery Method): room air        PHYSICAL EXAM:  General: no distress  HEENT: sclera anicteric  Neck: supple  CVS: JVP ~ 7 cm H20, RRR, s1, s2, no murmurs/rubs/gallops  Chest: unlabored respirations, clear to auscultation b/l  Abdomen: non-distended  Extremities: no lower extremity edema b/l  Neuro: awake, alert & oriented  Psych: normal affect      LABS:                        13.4   7.95  )-----------( 191      ( 27 Feb 2024 12:43 )             41.7     02-27    144  |  107  |  25<H>  ----------------------------<  127<H>  3.9   |  28  |  1.16    Ca    9.3      27 Feb 2024 12:43    TPro  7.2  /  Alb  3.5  /  TBili  0.7  /  DBili  x   /  AST  21  /  ALT  7<L>  /  AlkPhos  60  02-27        PT/INR - ( 27 Feb 2024 12:43 )   PT: 12.8 sec;   INR: 1.10 ratio             ECG (2/27/24): normal sinus rhythm, incomplete RBBB

## 2024-02-27 NOTE — ED ADULT TRIAGE NOTE - HISTORY OF COVID-19 VACCINATION
Problem: Skin Integrity:  Goal: Will show no infection signs and symptoms  Description: Will show no infection signs and symptoms  12/21/2021 0814 by Oliverio Cuellar RN  Outcome: Ongoing     Problem: Skin Integrity:  Goal: Absence of new skin breakdown  Description: Absence of new skin breakdown  12/21/2021 0814 by Oliverio Cuellar RN  Outcome: Ongoing     Problem: Falls - Risk of:  Goal: Will remain free from falls  Description: Will remain free from falls  12/21/2021 0814 by Oliverio Cuellar RN  Outcome: Ongoing     Problem: Falls - Risk of:  Goal: Absence of physical injury  Description: Absence of physical injury  12/21/2021 0814 by Oliverio Cuellar RN  Outcome: Ongoing     Problem: Discharge Planning:  Goal: Discharged to appropriate level of care  Description: Discharged to appropriate level of care  12/21/2021 0814 by Oliverio Cuellar RN  Outcome: Ongoing     Problem: Gas Exchange - Impaired:  Goal: Levels of oxygenation will improve  Description: Levels of oxygenation will improve  12/21/2021 0814 by Oliverio Cuellar RN  Outcome: Ongoing     Problem: Infection, Septic Shock:  Goal: Will show no infection signs and symptoms  Description: Will show no infection signs and symptoms  12/21/2021 0814 by Oliverio Cuellar RN  Outcome: Ongoing     Problem: Infection - Ventilator-Associated Pneumonia:  Goal: Absence of pulmonary infection  Description: Absence of pulmonary infection  12/21/2021 0814 by Oliverio Cuellar RN  Outcome: Ongoing Yes

## 2024-02-27 NOTE — PRE-OP CHECKLIST - SIDE RAILS UP
Ventricular Rate : 87  Atrial Rate : 87  P-R Interval : 144  QRS Duration : 84  Q-T Interval : 368  QTC Calculation(Bazett) : 442  P Axis : 66  R Axis : 14  T Axis : 29  Diagnosis : Normal sinus rhythm  Normal ECG  No previous ECGs available  Confirmed by JOLENE MAIER MASOOD ([id number]) on 7/26/2020 12:08:37 AM  
done

## 2024-02-27 NOTE — ED PROVIDER NOTE - OBJECTIVE STATEMENT
78-year-old male status post fall complaint of left hip pain unable to ambulate history of Parkinson's disease 78-year-old male status post fall complaint of left hip pain unable to ambulate history of Parkinson's disease  Pain is 10 out of 10 worse on motion better with rest

## 2024-02-27 NOTE — CONSULT NOTE ADULT - SUBJECTIVE AND OBJECTIVE BOX
PAST MEDICAL & SURGICAL HISTORY:  Parkinsons disease      Cancer of prostate      Radiation proctitis      H/O colectomy    Social History:  denies tobacco, drug alcohol use    Allergies    Allergy Status Unknown    Intolerances    Home Medications:  Sinemet 25 mg-100 mg oral tablet: 1 orally 3 times a day (03 May 2023 16:50)    MEDICATIONS  (STANDING):  carbidopa/levodopa  25/100 1 Tablet(s) Oral three times a day  dextrose 5% + sodium chloride 0.45%. 1000 milliLiter(s) (100 mL/Hr) IV Continuous <Continuous>    MEDICATIONS  (PRN):  acetaminophen     Tablet .. 650 milliGRAM(s) Oral every 6 hours PRN Temp greater or equal to 38C (100.4F), Mild Pain (1 - 3)  aluminum hydroxide/magnesium hydroxide/simethicone Suspension 30 milliLiter(s) Oral every 4 hours PRN Dyspepsia  melatonin 3 milliGRAM(s) Oral at bedtime PRN Insomnia  morphine  - Injectable 4 milliGRAM(s) IV Push every 4 hours PRN Moderate Pain (4 - 6)  ondansetron Injectable 4 milliGRAM(s) IV Push every 8 hours PRN Nausea and/or Vomiting                          13.4   7.95  )-----------( 191      ( 27 Feb 2024 12:43 )             41.7   02-27    144  |  107  |  25<H>  ----------------------------<  127<H>  3.9   |  28  |  1.16    Ca    9.3      27 Feb 2024 12:43    TPro  7.2  /  Alb  3.5  /  TBili  0.7  /  DBili  x   /  AST  21  /  ALT  7<L>  /  AlkPhos  60  02-27      PT/INR - ( 27 Feb 2024 12:43 )   PT: 12.8 sec;   INR: 1.10 ratio         Troponin I, High Sensitivity Result: 24.6: Serial measurements of high sensitivity troponin I (hs TnI) showing rapid   upward or downward changes suggest acute myocardial injury. Please note   that a sustained elevation of hsTnI can be caused by renal disease,   chronic heart failure, sepsis, pulmonary embolism and other clinical   conditions. ng/L (02.27.24 @ 12:43)    PAST MEDICAL & SURGICAL HISTORY:  Parkinsons disease      Cancer of prostate      Radiation proctitis      H/O colectomy    Social History:  denies tobacco, drug alcohol use    Allergies    Allergy Status Unknown    Intolerances    Home Medications:  Sinemet 25 mg-100 mg oral tablet: 1 orally 3 times a day (03 May 2023 16:50)    MEDICATIONS  (STANDING):  carbidopa/levodopa  25/100 1 Tablet(s) Oral three times a day  dextrose 5% + sodium chloride 0.45%. 1000 milliLiter(s) (100 mL/Hr) IV Continuous <Continuous>    MEDICATIONS  (PRN):  acetaminophen     Tablet .. 650 milliGRAM(s) Oral every 6 hours PRN Temp greater or equal to 38C (100.4F), Mild Pain (1 - 3)  aluminum hydroxide/magnesium hydroxide/simethicone Suspension 30 milliLiter(s) Oral every 4 hours PRN Dyspepsia  melatonin 3 milliGRAM(s) Oral at bedtime PRN Insomnia  morphine  - Injectable 4 milliGRAM(s) IV Push every 4 hours PRN Moderate Pain (4 - 6)  ondansetron Injectable 4 milliGRAM(s) IV Push every 8 hours PRN Nausea and/or Vomiting                          13.4   7.95  )-----------( 191      ( 27 Feb 2024 12:43 )             41.7   02-27    144  |  107  |  25<H>  ----------------------------<  127<H>  3.9   |  28  |  1.16    Ca    9.3      27 Feb 2024 12:43    TPro  7.2  /  Alb  3.5  /  TBili  0.7  /  DBili  x   /  AST  21  /  ALT  7<L>  /  AlkPhos  60  02-27      PT/INR - ( 27 Feb 2024 12:43 )   PT: 12.8 sec;   INR: 1.10 ratio         Troponin I, High Sensitivity Result: 24.6: Serial measurements of high sensitivity troponin I (hs TnI) showing rapid   upward or downward changes suggest acute myocardial injury. Please note   that a sustained elevation of hsTnI can be caused by renal disease,   chronic heart failure, sepsis, pulmonary embolism and other clinical   conditions. ng/L (02.27.24 @ 12:43)             < from: CT Pelvis Bony Only No Cont (02.27.24 @ 14:59) >    ACC: [id number] EXAM:  CT PELVIS BONY ONLY   ORDERED BY: CARLOS NGO     PROCEDURE DATE:  02/27/2024          INTERPRETATION:  Exam Type: CT PELVIS BONY  Exam Date and Time: 2/27/2024 2:59 PM  Indication: Injury  Comparison: Pelvis radiograph performed same day.    TECHNIQUE:  Multiplanar CT images of the pelvis were obtained without contrast.    FINDINGS:    Comminuted and impacted left femoral neck fracture with anterior apex   angulation and varus deformity. The bilateral femoral heads are well   situated within the acetabulum with a background of mild to moderate   degenerative changes. Mild degenerative changes of the pubic symphysis   and bilateral sacroiliac joints. Moderate degenerative changes of the   lower lumbar spine. Vascular calcifications are present. Soft tissue   swelling is seen surrounding the fracture site.    IMPRESSION:  Comminuted and impacted left femoral neck fracture.    --- End of Report ---            VIN HUGGINS DO; Attending Radiologist  This document has been electronically signed. Feb 27 2024  3:06PM    < end of copied text >   HPI:  77M PMHx of PD (on carbidopa/levodopa 25/100 TID, f/b Movement Neurologist Dr. Ortega), prior colon CA w/ ostomy, prior prostate CA s/p radiation cystitis, blurry vision 2/2 cataracts baseline, who presents to ED s/p mechanical fall at home. per wife at the bedside pt ambulates without devise in the house, and has had multiple falls due to poor balance from Parkinsons, but usually falls on his buttocks, today he fell on his left side.   he was admitted may/2023 for dysarthia ans cva and sz was ruled out.     denies n/v, sob, fever, cough, dysuria.  per wife he is constipated, last BM 2 days back.    seen by ortho, plan for surgery this evening pending clearance   no prior echo to compare- ordered echo now, cardio consulted for clearence    (27 Feb 2024 14:47)       Pt seen and examined at bedside. daughter present.  s/p fall at home 3 days ago.  Increased pain left hip worsening over past few days. Pt ambulates with cane. mobility affected by Parkinson's.  No other associated symptoms. No LOC. Pt resting comfortably pain controlled.      PAST MEDICAL & SURGICAL HISTORY:  Parkinsons disease      Cancer of prostate      Radiation proctitis      H/O colectomy    Social History:  denies tobacco, drug alcohol use    Allergies    Allergy Status Unknown    Intolerances    Home Medications:  Sinemet 25 mg-100 mg oral tablet: 1 orally 3 times a day (03 May 2023 16:50)    MEDICATIONS  (STANDING):  carbidopa/levodopa  25/100 1 Tablet(s) Oral three times a day  dextrose 5% + sodium chloride 0.45%. 1000 milliLiter(s) (100 mL/Hr) IV Continuous <Continuous>    MEDICATIONS  (PRN):  acetaminophen     Tablet .. 650 milliGRAM(s) Oral every 6 hours PRN Temp greater or equal to 38C (100.4F), Mild Pain (1 - 3)  aluminum hydroxide/magnesium hydroxide/simethicone Suspension 30 milliLiter(s) Oral every 4 hours PRN Dyspepsia  melatonin 3 milliGRAM(s) Oral at bedtime PRN Insomnia  morphine  - Injectable 4 milliGRAM(s) IV Push every 4 hours PRN Moderate Pain (4 - 6)  ondansetron Injectable 4 milliGRAM(s) IV Push every 8 hours PRN Nausea and/or Vomiting        PE:  Vital Signs Last 24 Hrs  T(C): 36.3 (27 Feb 2024 15:11), Max: 36.3 (27 Feb 2024 15:11)  T(F): 97.4 (27 Feb 2024 15:11), Max: 97.4 (27 Feb 2024 15:11)  HR: 77 (27 Feb 2024 15:11) (73 - 77)  BP: 185/89 (27 Feb 2024 15:11) (105/66 - 185/89)  BP(mean): 115 (27 Feb 2024 15:11) (115 - 115)  RR: 17 (27 Feb 2024 15:11) (15 - 19)  SpO2: 97% (27 Feb 2024 15:11) (95% - 100%)    Parameters below as of 27 Feb 2024 15:11  Patient On (Oxygen Delivery Method): room air    GENERAL- NAD  EAR/NOSE/MOUTH/THROAT -   MMM  EYES- CAN, conjunctiva and Sclera clear  NECK- supple  RESPIRATORY-  clear to auscultation bilaterally, non laboured breathing  CARDIOVASCULAR - SIS2, RRR  GI - soft NT BS present  EXTREMITIES- no pedal edema  NEUROLOGY- no gross focal deficits, LLE weakness due to left hip fracture   PSYCHIATRY- AAO X 3  MUSCULOSKELETAL- ROM  restricted to LLE  due to pain NVI sensory intact                         13.4   7.95  )-----------( 191      ( 27 Feb 2024 12:43 )             41.7   02-27    144  |  107  |  25<H>  ----------------------------<  127<H>  3.9   |  28  |  1.16    Ca    9.3      27 Feb 2024 12:43    TPro  7.2  /  Alb  3.5  /  TBili  0.7  /  DBili  x   /  AST  21  /  ALT  7<L>  /  AlkPhos  60  02-27      PT/INR - ( 27 Feb 2024 12:43 )   PT: 12.8 sec;   INR: 1.10 ratio         Troponin I, High Sensitivity Result: 24.6: Serial measurements of high sensitivity troponin I (hs TnI) showing rapid   upward or downward changes suggest acute myocardial injury. Please note   that a sustained elevation of hsTnI can be caused by renal disease,   chronic heart failure, sepsis, pulmonary embolism and other clinical   conditions. ng/L (02.27.24 @ 12:43)             < from: CT Pelvis Bony Only No Cont (02.27.24 @ 14:59) >    ACC: [id number] EXAM:  CT PELVIS BONY ONLY   ORDERED BY: CARLOS NGO     PROCEDURE DATE:  02/27/2024          INTERPRETATION:  Exam Type: CT PELVIS BONY  Exam Date and Time: 2/27/2024 2:59 PM  Indication: Injury  Comparison: Pelvis radiograph performed same day.    TECHNIQUE:  Multiplanar CT images of the pelvis were obtained without contrast.    FINDINGS:    Comminuted and impacted left femoral neck fracture with anterior apex   angulation and varus deformity. The bilateral femoral heads are well   situated within the acetabulum with a background of mild to moderate   degenerative changes. Mild degenerative changes of the pubic symphysis   and bilateral sacroiliac joints. Moderate degenerative changes of the   lower lumbar spine. Vascular calcifications are present. Soft tissue   swelling is seen surrounding the fracture site.    IMPRESSION:  Comminuted and impacted left femoral neck fracture.    --- End of Report ---            VIN HUGGINS DO; Attending Radiologist  This document has been electronically signed. Feb 27 2024  3:06PM    < end of copied text >   HPI:  77M PMHx of PD (on carbidopa/levodopa 25/100 TID, f/b Movement Neurologist Dr. Ortega), prior colon CA w/ ostomy, prior prostate CA s/p radiation cystitis, blurry vision 2/2 cataracts baseline, who presents to ED s/p mechanical fall at home. per wife at the bedside pt ambulates without devise in the house, and has had multiple falls due to poor balance from Parkinsons, but usually falls on his buttocks, today he fell on his left side.   he was admitted may/2023 for dysarthia ans cva and sz was ruled out.     denies n/v, sob, fever, cough, dysuria.  per wife he is constipated, last BM 2 days back.    seen by ortho, plan for surgery this evening pending clearance   no prior echo to compare- ordered echo now, cardio consulted for clearence    (27 Feb 2024 14:47)       Pt seen and examined at bedside. daughter present.  s/p fall at home 3 days ago.  Increased pain left hip worsening over past few days. Pt ambulates with cane. mobility affected by Parkinson's.  No other associated symptoms. No LOC. Pt resting comfortably pain controlled.      PAST MEDICAL & SURGICAL HISTORY:  Parkinsons disease      Cancer of prostate      Radiation proctitis      H/O colectomy    Social History:  denies tobacco, drug alcohol use    Allergies    Allergy Status Unknown    Intolerances    Home Medications:  Sinemet 25 mg-100 mg oral tablet: 1 orally 3 times a day (03 May 2023 16:50)    MEDICATIONS  (STANDING):  carbidopa/levodopa  25/100 1 Tablet(s) Oral three times a day  dextrose 5% + sodium chloride 0.45%. 1000 milliLiter(s) (100 mL/Hr) IV Continuous <Continuous>    MEDICATIONS  (PRN):  acetaminophen     Tablet .. 650 milliGRAM(s) Oral every 6 hours PRN Temp greater or equal to 38C (100.4F), Mild Pain (1 - 3)  aluminum hydroxide/magnesium hydroxide/simethicone Suspension 30 milliLiter(s) Oral every 4 hours PRN Dyspepsia  melatonin 3 milliGRAM(s) Oral at bedtime PRN Insomnia  morphine  - Injectable 4 milliGRAM(s) IV Push every 4 hours PRN Moderate Pain (4 - 6)  ondansetron Injectable 4 milliGRAM(s) IV Push every 8 hours PRN Nausea and/or Vomiting        PE:  Vital Signs Last 24 Hrs  T(C): 36.3 (27 Feb 2024 15:11), Max: 36.3 (27 Feb 2024 15:11)  T(F): 97.4 (27 Feb 2024 15:11), Max: 97.4 (27 Feb 2024 15:11)  HR: 77 (27 Feb 2024 15:11) (73 - 77)  BP: 185/89 (27 Feb 2024 15:11) (105/66 - 185/89)  BP(mean): 115 (27 Feb 2024 15:11) (115 - 115)  RR: 17 (27 Feb 2024 15:11) (15 - 19)  SpO2: 97% (27 Feb 2024 15:11) (95% - 100%)    Parameters below as of 27 Feb 2024 15:11  Patient On (Oxygen Delivery Method): room air    GENERAL- NAD  EAR/NOSE/MOUTH/THROAT -   MMM  EYES- CAN, conjunctiva and Sclera clear  NECK- supple  RESPIRATORY-  clear to auscultation bilaterally, non laboured breathing  CARDIOVASCULAR - SIS2, RRR  GI - soft NT BS present  EXTREMITIES- no pedal edema  NEUROLOGY- no gross focal deficits, LLE weakness due to left hip fracture   PSYCHIATRY- AAO X 3  MUSCULOSKELETAL- ROM  restricted to LLE  due to pain NVI sensory intact rotated and shortened                        13.4   7.95  )-----------( 191      ( 27 Feb 2024 12:43 )             41.7   02-27    144  |  107  |  25<H>  ----------------------------<  127<H>  3.9   |  28  |  1.16    Ca    9.3      27 Feb 2024 12:43    TPro  7.2  /  Alb  3.5  /  TBili  0.7  /  DBili  x   /  AST  21  /  ALT  7<L>  /  AlkPhos  60  02-27      PT/INR - ( 27 Feb 2024 12:43 )   PT: 12.8 sec;   INR: 1.10 ratio         Troponin I, High Sensitivity Result: 24.6: Serial measurements of high sensitivity troponin I (hs TnI) showing rapid   upward or downward changes suggest acute myocardial injury. Please note   that a sustained elevation of hsTnI can be caused by renal disease,   chronic heart failure, sepsis, pulmonary embolism and other clinical   conditions. ng/L (02.27.24 @ 12:43)             < from: CT Pelvis Bony Only No Cont (02.27.24 @ 14:59) >    ACC: [id number] EXAM:  CT PELVIS BONY ONLY   ORDERED BY: CARLOS NGO     PROCEDURE DATE:  02/27/2024          INTERPRETATION:  Exam Type: CT PELVIS BONY  Exam Date and Time: 2/27/2024 2:59 PM  Indication: Injury  Comparison: Pelvis radiograph performed same day.    TECHNIQUE:  Multiplanar CT images of the pelvis were obtained without contrast.    FINDINGS:    Comminuted and impacted left femoral neck fracture with anterior apex   angulation and varus deformity. The bilateral femoral heads are well   situated within the acetabulum with a background of mild to moderate   degenerative changes. Mild degenerative changes of the pubic symphysis   and bilateral sacroiliac joints. Moderate degenerative changes of the   lower lumbar spine. Vascular calcifications are present. Soft tissue   swelling is seen surrounding the fracture site.    IMPRESSION:  Comminuted and impacted left femoral neck fracture.    --- End of Report ---            VIN HUGGINS DO; Attending Radiologist  This document has been electronically signed. Feb 27 2024  3:06PM    < end of copied text >

## 2024-02-27 NOTE — PROGRESS NOTE ADULT - ASSESSMENT
78y Male admitted POD #0 from left total hip arthroplasty          PLAN:    -serial exams, any changes in exam to be escelated to surgical team immedietly  -pain control  -finish post op anbx  -IVFs until taking adequate PO  -advance diet per surgical team  -dressing changes per surgical team  -DVT prophylaxis  -AM labs           TIME SPENT:  55 minutes of  time spent providing medical care for patient's acute illness/conditions that impairs at least one vital organ system and/or poses a high risk of imminent or life threatening deterioration in the patient's condition. It includes time spent evaluating and treating the patient's acute illness as well as time spent reviewing labs, radiology, discussing goals of care with patient and/or patient's family, and discussing the case with a multidisciplinary team, including the eICU, in an effort to prevent further life threatening deterioration or end organ damage. This time is independent of any procedures performed.    DATE OF ENTRY OF THIS NOTE IS EQUAL TO THE DATE OF SERVICES RENDERED

## 2024-02-27 NOTE — ED ADULT NURSE NOTE - DRUG PRE-SCREENING (DAST -1)
- listed for liver transplant with MELD 20  - continue diuretics  - continue lactulose/rifaximin/zinc  - continue midodrine    MELD-Na score: 14 at 10/21/2020  6:33 AM  MELD score: 14 at 10/21/2020  6:33 AM  Calculated from:  Serum Creatinine: 1.1 mg/dL at 10/21/2020  6:33 AM  Serum Sodium: 138 mmol/L (Rounded to 137 mmol/L) at 10/21/2020  6:33 AM  Total Bilirubin: 3.1 mg/dL at 10/21/2020  6:33 AM  INR(ratio): 1.2 at 10/21/2020  6:33 AM  Age: 54 years 1 month   Statement Selected

## 2024-02-27 NOTE — CONSULT NOTE ADULT - ASSESSMENT
A: left femoral neck fracture  P: bedrest  NPO  type and cross   pain meds  IVF  medical clearance  plan OR left hemiarthroplasty tthis georgette if cleared by medicine A: left femoral neck fracture  all diagnostic imaging and results reviewed by Dr Diaz he will see pt this evening  P: bedrest  NPO  type and cross   pain meds  IVF  medical/cardiac clearance  plan OR left hemiarthroplasty this georgette if cleared by medicine A: left femoral neck fracture  all diagnostic imaging and results reviewed by Dr Diaz he will see pt this evening  P: bedrest  NPO  type and cross   pain meds  IVF  medical/cardiac clearance  plan OR left JENNIFER  this georgette if cleared by medicine

## 2024-02-27 NOTE — H&P ADULT - ASSESSMENT
77M PMHx of PD (on carbidopa/levodopa 25/100 TID, f/b Movement Neurologist Dr. Ortega), prior colon CA w/ ostomy, prior prostate CA s/p radiation cystitis, blurry vision 2/2 cataracts baseline, who presents to ED s/p mechanical fall at home. per wife at the bedside pt ambulates without devise in the house, and has had multiple falls due to poor balance from Parkinsons, but usually falls on his buttocks, today he fell on his left side.   he was admitted may/2023 for dysarthia ans cva and sz was ruled out.     mechanical fall, severe pain int he left hip, left hip fracture  admit to med/surg  bedrest, NWB LLE  fall and asp precautions  npo  iv fluids  pain meds- morphine prn   ortho dr. german called by ED-   seen by ortho, plan for surgery this evening pending clearance   no prior echo to compare- ordered echo now, cardio consulted for clearance     parkinson's-   c/w sinemet    constipation-  per wife he is constipated, last BM 2 days back.  Miralax prn    vte ppx- PAS for now pending surgical procedure this evening   will defer  chemical ppx to ortho pot procedure    gi ppx-  ppi     plan d/w wife and pt at the bedside  and d/w dr. fairchild

## 2024-02-27 NOTE — H&P ADULT - HISTORY OF PRESENT ILLNESS
77M PMHx of PD (on carbidopa/levodopa 25/100 TID, f/b Movement Neurologist Dr. Ortega), prior colon CA w/ ostomy, prior prostate CA s/p radiation cystitis, blurry vision 2/2 cataracts baseline, who presents to ED s/p mechanical fall at home. per wife at the bedside pt ambulates without devise in the house, and has had multiple falls due to poor balance from Parkinsons, but usually falls on his buttocks, today he fell on his left side.   he was admitted may/2023 for dysarthia ans cva and sz was ruled out.     denies n/v, sob, fever, cough, dysuria.  per wife he is constipated, last BM 2 days back.    seen by ortho, plan for surgery this evening pending clearance   no prior echo to compare- ordered echo now, cardio consulted for clearence

## 2024-02-27 NOTE — ED PROVIDER NOTE - PHYSICAL EXAMINATION
General:     NAD, well-nourished, well-appearing  Head:     NC/AT, EOMI, oral mucosa moist  Neck:     trachea midline  Lungs:     CTA b/l, no w/r/r  CVS:     S1S2, RRR, no m/g/r  Abd:     +BS, s/nt/nd, no organomegaly  Ext:    2+ radial and pedal pulses, no c/c/e  Ortho–significant left hip tenderness extremely painful range of motion externally rotated left foot Neurovascular lower extremities intact  Neuro: AAOx3, no sensory/motor deficits

## 2024-02-27 NOTE — H&P ADULT - NSHPLABSRESULTS_GEN_ALL_CORE
13.4                 144  | 28   | 25           7.95  >-----------< 191     ------------------------< 127                   41.7                 3.9  | 107  | 1.16                                         Ca 9.3   Mg x     Ph x        INR: 1.10: Recommended targets/ranges for therapeutic INR:  2.0-3.0 Deep vein thrombosis, pulmonary embolism, atrial fibrillation  2.0-3.0 Mechanical aortic valve, antiphospholipid syndrome with previous  arterial or venous thromboembolism  2.5-3.5 Mechanical mitral valve, double mechanical valve (aortic and  mitral positions, high risk valves)  Note: Chest 2012 Feb;141(2 Suppl):7S-47S  Routine coagulation results should be interpreted with caution when  taking Factor Xa inhibitors or direct thrombin inhibitors; blood sampling  prior to drug intake is recommended. ratio (02.27.24 @ 12:43)      Troponin I, High Sensitivity Result: 24.6: Serial measurements of high sensitivity troponin I (hs TnI) showing rapid  upward or downward changes suggest acute myocardial injury.  Please note  that a sustained elevation of hsTnI can be caused by renal disease,  chronic heart failure, sepsis, pulmonary embolism and other clinical  conditions. ng/L (02.27.24 @ 12:43)      x ray pelvis- left femur fx     Labs reviewed:     CXR personally reviewed: napd    ECG reviewed and interpreted: sinus at 74

## 2024-02-27 NOTE — PATIENT PROFILE ADULT - FALL HARM RISK - HARM RISK INTERVENTIONS

## 2024-02-28 LAB
ALBUMIN SERPL ELPH-MCNC: 3.1 G/DL — LOW (ref 3.3–5)
ALP SERPL-CCNC: 51 U/L — SIGNIFICANT CHANGE UP (ref 40–120)
ALT FLD-CCNC: 6 U/L — LOW (ref 10–45)
ANION GAP SERPL CALC-SCNC: 12 MMOL/L — SIGNIFICANT CHANGE UP (ref 5–17)
AST SERPL-CCNC: 34 U/L — SIGNIFICANT CHANGE UP (ref 10–40)
BASOPHILS # BLD AUTO: 0.03 K/UL — SIGNIFICANT CHANGE UP (ref 0–0.2)
BASOPHILS NFR BLD AUTO: 0.4 % — SIGNIFICANT CHANGE UP (ref 0–2)
BILIRUB SERPL-MCNC: 0.8 MG/DL — SIGNIFICANT CHANGE UP (ref 0.2–1.2)
BUN SERPL-MCNC: 14 MG/DL — SIGNIFICANT CHANGE UP (ref 7–23)
CALCIUM SERPL-MCNC: 8.9 MG/DL — SIGNIFICANT CHANGE UP (ref 8.4–10.5)
CHLORIDE SERPL-SCNC: 106 MMOL/L — SIGNIFICANT CHANGE UP (ref 96–108)
CO2 SERPL-SCNC: 26 MMOL/L — SIGNIFICANT CHANGE UP (ref 22–31)
CREAT SERPL-MCNC: 0.96 MG/DL — SIGNIFICANT CHANGE UP (ref 0.5–1.3)
EGFR: 80 ML/MIN/1.73M2 — SIGNIFICANT CHANGE UP
EOSINOPHIL # BLD AUTO: 0.34 K/UL — SIGNIFICANT CHANGE UP (ref 0–0.5)
EOSINOPHIL NFR BLD AUTO: 5.1 % — SIGNIFICANT CHANGE UP (ref 0–6)
GLUCOSE BLDC GLUCOMTR-MCNC: 115 MG/DL — HIGH (ref 70–99)
GLUCOSE SERPL-MCNC: 109 MG/DL — HIGH (ref 70–99)
HCT VFR BLD CALC: 38.8 % — LOW (ref 39–50)
HGB BLD-MCNC: 12.4 G/DL — LOW (ref 13–17)
IMM GRANULOCYTES NFR BLD AUTO: 0.4 % — SIGNIFICANT CHANGE UP (ref 0–0.9)
LYMPHOCYTES # BLD AUTO: 0.86 K/UL — LOW (ref 1–3.3)
LYMPHOCYTES # BLD AUTO: 12.8 % — LOW (ref 13–44)
MCHC RBC-ENTMCNC: 28.3 PG — SIGNIFICANT CHANGE UP (ref 27–34)
MCHC RBC-ENTMCNC: 32 GM/DL — SIGNIFICANT CHANGE UP (ref 32–36)
MCV RBC AUTO: 88.6 FL — SIGNIFICANT CHANGE UP (ref 80–100)
MONOCYTES # BLD AUTO: 0.51 K/UL — SIGNIFICANT CHANGE UP (ref 0–0.9)
MONOCYTES NFR BLD AUTO: 7.6 % — SIGNIFICANT CHANGE UP (ref 2–14)
NEUTROPHILS # BLD AUTO: 4.95 K/UL — SIGNIFICANT CHANGE UP (ref 1.8–7.4)
NEUTROPHILS NFR BLD AUTO: 73.7 % — SIGNIFICANT CHANGE UP (ref 43–77)
NRBC # BLD: 0 /100 WBCS — SIGNIFICANT CHANGE UP (ref 0–0)
PLATELET # BLD AUTO: 193 K/UL — SIGNIFICANT CHANGE UP (ref 150–400)
POTASSIUM SERPL-MCNC: 3.7 MMOL/L — SIGNIFICANT CHANGE UP (ref 3.5–5.3)
POTASSIUM SERPL-SCNC: 3.7 MMOL/L — SIGNIFICANT CHANGE UP (ref 3.5–5.3)
PROT SERPL-MCNC: 6.6 G/DL — SIGNIFICANT CHANGE UP (ref 6–8.3)
RBC # BLD: 4.38 M/UL — SIGNIFICANT CHANGE UP (ref 4.2–5.8)
RBC # FLD: 14.4 % — SIGNIFICANT CHANGE UP (ref 10.3–14.5)
SODIUM SERPL-SCNC: 144 MMOL/L — SIGNIFICANT CHANGE UP (ref 135–145)
WBC # BLD: 6.72 K/UL — SIGNIFICANT CHANGE UP (ref 3.8–10.5)
WBC # FLD AUTO: 6.72 K/UL — SIGNIFICANT CHANGE UP (ref 3.8–10.5)

## 2024-02-28 PROCEDURE — 99232 SBSQ HOSP IP/OBS MODERATE 35: CPT

## 2024-02-28 PROCEDURE — 93010 ELECTROCARDIOGRAM REPORT: CPT

## 2024-02-28 PROCEDURE — 99233 SBSQ HOSP IP/OBS HIGH 50: CPT

## 2024-02-28 RX ORDER — POLYETHYLENE GLYCOL 3350 17 G/17G
17 POWDER, FOR SOLUTION ORAL DAILY
Refills: 0 | Status: DISCONTINUED | OUTPATIENT
Start: 2024-02-28 | End: 2024-03-01

## 2024-02-28 RX ORDER — SENNA PLUS 8.6 MG/1
2 TABLET ORAL AT BEDTIME
Refills: 0 | Status: DISCONTINUED | OUTPATIENT
Start: 2024-02-28 | End: 2024-03-01

## 2024-02-28 RX ORDER — SODIUM CHLORIDE 9 MG/ML
500 INJECTION, SOLUTION INTRAVENOUS
Refills: 0 | Status: DISCONTINUED | OUTPATIENT
Start: 2024-02-28 | End: 2024-02-28

## 2024-02-28 RX ADMIN — Medication 650 MILLIGRAM(S): at 10:18

## 2024-02-28 RX ADMIN — CARBIDOPA AND LEVODOPA 1 TABLET(S): 25; 100 TABLET ORAL at 21:09

## 2024-02-28 RX ADMIN — Medication 650 MILLIGRAM(S): at 09:18

## 2024-02-28 RX ADMIN — SENNA PLUS 2 TABLET(S): 8.6 TABLET ORAL at 21:09

## 2024-02-28 RX ADMIN — POLYETHYLENE GLYCOL 3350 17 GRAM(S): 17 POWDER, FOR SOLUTION ORAL at 17:22

## 2024-02-28 RX ADMIN — Medication 100 MILLIGRAM(S): at 14:31

## 2024-02-28 RX ADMIN — Medication 81 MILLIGRAM(S): at 05:49

## 2024-02-28 RX ADMIN — Medication 100 MILLIGRAM(S): at 19:44

## 2024-02-28 RX ADMIN — SODIUM CHLORIDE 75 MILLILITER(S): 9 INJECTION, SOLUTION INTRAVENOUS at 05:49

## 2024-02-28 RX ADMIN — Medication 650 MILLIGRAM(S): at 18:38

## 2024-02-28 RX ADMIN — CARBIDOPA AND LEVODOPA 1 TABLET(S): 25; 100 TABLET ORAL at 05:49

## 2024-02-28 RX ADMIN — Medication 100 MILLIGRAM(S): at 04:37

## 2024-02-28 RX ADMIN — SODIUM CHLORIDE 500 MILLILITER(S): 9 INJECTION, SOLUTION INTRAVENOUS at 10:18

## 2024-02-28 RX ADMIN — SODIUM CHLORIDE 75 MILLILITER(S): 9 INJECTION, SOLUTION INTRAVENOUS at 00:30

## 2024-02-28 RX ADMIN — Medication 81 MILLIGRAM(S): at 17:22

## 2024-02-28 RX ADMIN — CARBIDOPA AND LEVODOPA 1 TABLET(S): 25; 100 TABLET ORAL at 14:31

## 2024-02-28 RX ADMIN — CARBIDOPA AND LEVODOPA 1 TABLET(S): 25; 100 TABLET ORAL at 00:28

## 2024-02-28 NOTE — PROGRESS NOTE ADULT - SUBJECTIVE AND OBJECTIVE BOX
Patient is a 78y old  Male who presents with a chief complaint of fall, left hip pain (28 Feb 2024 09:44)  S/P left Total Hip Replacement  POD #1  Patient was seen and examined by me this am.    There were no acuite events noted overnight but he had an episode of dizziness when attempting to get OOB and vaso vagaled.    He is feeling much better this afternoon but still a little lethargic.     He is able to answer questions appropriately, tolerated something to eat, voiding,   He is not complaining of pain.    ALLERGIES:  No Known Allergies    MEDICATIONS  (STANDING):  aspirin enteric coated 81 milliGRAM(s) Oral every 12 hours  carbidopa/levodopa  25/100 1 Tablet(s) Oral three times a day  ceFAZolin   IVPB 2000 milliGRAM(s) IV Intermittent every 8 hours  lactated ringers. 1000 milliLiter(s) (75 mL/Hr) IV Continuous <Continuous>  lactated ringers. 500 milliLiter(s) (500 mL/Hr) IV Continuous <Continuous>  senna 2 Tablet(s) Oral at bedtime    MEDICATIONS  (PRN):  acetaminophen     Tablet .. 650 milliGRAM(s) Oral every 6 hours PRN Temp greater or equal to 38C (100.4F), Mild Pain (1 - 3)  aluminum hydroxide/magnesium hydroxide/simethicone Suspension 30 milliLiter(s) Oral every 4 hours PRN Dyspepsia  morphine  - Injectable 2 milliGRAM(s) IV Push every 6 hours PRN Severe Pain (7 - 10)  ondansetron Injectable 4 milliGRAM(s) IV Push every 8 hours PRN Nausea and/or Vomiting  oxyCODONE  IR 5 milliGRAM(s) Oral every 6 hours PRN Moderate Pain (4 - 6)  polyethylene glycol 3350 17 Gram(s) Oral daily PRN Constipation    Vital Signs Last 24 Hrs  T(F): 99.1 (28 Feb 2024 12:49), Max: 99.1 (28 Feb 2024 12:49)  HR: 106 (28 Feb 2024 12:49) (65 - 106)  BP: 110/89 (28 Feb 2024 14:29) (110/89 - 180/75)  RR: 18 (28 Feb 2024 12:49) (13 - 24)  SpO2: 98% (28 Feb 2024 12:49) (94% - 99%)  I&O's Summary    27 Feb 2024 07:01  -  28 Feb 2024 07:00  --------------------------------------------------------  IN: 100 mL / OUT: 1 mL / NET: 99 mL      PHYSICAL EXAM:  General: NAD, A/O x 3  ENT: MMM  Neck: Supple, No JVD  Lungs: Clear to auscultation bilaterally  Cardio: RRR, S1/S2, No murmurs  Abdomen: Soft, Nontender, Nondistended; Bowel sounds present, voiding  Extremities: No calf tenderness, No pitting edema right   SCD s bilateral   Left hip dressing clean and dry, thigh supple , no ecchymosis noted, + neurovascular intact     LABS:                        12.4   6.72  )-----------( 193      ( 28 Feb 2024 06:25 )             38.8     02-28    144  |  106  |  14  ----------------------------<  109  3.7   |  26  |  0.96    Ca    8.9      28 Feb 2024 06:25    TPro  6.6  /  Alb  3.1  /  TBili  0.8  /  DBili  x   /  AST  34  /  ALT  6   /  AlkPhos  51  02-28      PT/INR - ( 27 Feb 2024 12:43 )   PT: 12.8 sec;   INR: 1.10 ratio          CARDIAC MARKERS ( 27 Feb 2024 12:43 )  x     / 24.6 ng/L / x     / x     / x          POCT Blood Glucose.: 115 mg/dL (28 Feb 2024 10:06)      Urinalysis Basic - ( 28 Feb 2024 06:25 )    Color: x / Appearance: x / SG: x / pH: x  Gluc: 109 mg/dL / Ketone: x  / Bili: x / Urobili: x   Blood: x / Protein: x / Nitrite: x   Leuk Esterase: x / RBC: x / WBC x   Sq Epi: x / Non Sq Epi: x / Bacteria: x        RADIOLOGY & ADDITIONAL TESTS:    Care Discussed with Consultants/Other Providers:

## 2024-02-28 NOTE — PROGRESS NOTE ADULT - ASSESSMENT
78y Male admitted POD #1 from left total hip arthroplasty          PLAN:    -serial exams, any changes in exam to be escelated to surgical team immedietly  -pain control  -finish post op anbx  -IVFs until taking adequate PO  -advance diet per surgical team  -dressing changes per surgical team  -DVT prophylaxis  -AM labs           TIME SPENT:  35 minutes of  time spent providing medical care for patient's acute illness/conditions that impairs at least one vital organ system and/or poses a high risk of imminent or life threatening deterioration in the patient's condition. It includes time spent evaluating and treating the patient's acute illness as well as time spent reviewing labs, radiology, discussing goals of care with patient and/or patient's family, and discussing the case with a multidisciplinary team, including the eICU, in an effort to prevent further life threatening deterioration or end organ damage. This time is independent of any procedures performed.    DATE OF ENTRY OF THIS NOTE IS EQUAL TO THE DATE OF SERVICES RENDERED

## 2024-02-28 NOTE — PROGRESS NOTE ADULT - SUBJECTIVE AND OBJECTIVE BOX
Patient is a 78y old  Male who presents with a chief complaint of fall, left hip pain (28 Feb 2024 09:44)    Patient seen and examined at bedside. No overnight events reported. Patient seen and examined, was alert, oriented, conversational - shortly after, patient was taken out of bed to work with PT and appeared to syncopize and wasn't responding normally for a matter of seconds, RRT was called. Patient was assisted back into bed. Now is alert, no change in mental status compared to prior to this event     ALLERGIES:  No Known Allergies    MEDICATIONS  (STANDING):  aspirin enteric coated 81 milliGRAM(s) Oral every 12 hours  carbidopa/levodopa  25/100 1 Tablet(s) Oral three times a day  ceFAZolin   IVPB 2000 milliGRAM(s) IV Intermittent every 8 hours  lactated ringers. 1000 milliLiter(s) (75 mL/Hr) IV Continuous <Continuous>  lactated ringers. 500 milliLiter(s) (500 mL/Hr) IV Continuous <Continuous>  senna 2 Tablet(s) Oral at bedtime    MEDICATIONS  (PRN):  acetaminophen     Tablet .. 650 milliGRAM(s) Oral every 6 hours PRN Temp greater or equal to 38C (100.4F), Mild Pain (1 - 3)  aluminum hydroxide/magnesium hydroxide/simethicone Suspension 30 milliLiter(s) Oral every 4 hours PRN Dyspepsia  morphine  - Injectable 2 milliGRAM(s) IV Push every 6 hours PRN Severe Pain (7 - 10)  ondansetron Injectable 4 milliGRAM(s) IV Push every 8 hours PRN Nausea and/or Vomiting  oxyCODONE    IR 5 milliGRAM(s) Oral every 6 hours PRN Moderate Pain (4 - 6)  polyethylene glycol 3350 17 Gram(s) Oral daily PRN Constipation    Vital Signs Last 24 Hrs  T(F): 97.2 (27 Feb 2024 22:45), Max: 98.4 (27 Feb 2024 17:00)  HR: 77 (27 Feb 2024 22:45) (65 - 98)  BP: 157/75 (27 Feb 2024 22:45) (105/66 - 185/89)  RR: 14 (27 Feb 2024 22:45) (13 - 19)  SpO2: 96% (27 Feb 2024 22:45) (94% - 100%)    I&O's Summary  27 Feb 2024 07:01  -  28 Feb 2024 07:00  --------------------------------------------------------  IN: 100 mL / OUT: 1 mL / NET: 99 mL    PHYSICAL EXAM:  General: NAD, A/O x 3, denies pain   ENT: No gross hearing impairment, Moist mucous membranes, no thrush  Neck: Supple, No JVD  Lungs: Clear to auscultation bilaterally, good air entry, non-labored breathing  Cardio: RRR, S1/S2, No murmur  Abdomen: Soft, Nontender, Nondistended; Bowel sounds present  Extremities: No calf tenderness, No cyanosis, No pitting edema. Left hip surgical dressing clean, dry, intact   Psych: Appropriate mood and affect    LABS:                        12.4   6.72  )-----------( 193      ( 28 Feb 2024 06:25 )             38.8     02-28    144  |  106  |  14  ----------------------------<  109  3.7   |  26  |  0.96    Ca    8.9      28 Feb 2024 06:25    TPro  6.6  /  Alb  3.1  /  TBili  0.8  /  DBili  x   /  AST  34  /  ALT  6   /  AlkPhos  51  02-28          PT/INR - ( 27 Feb 2024 12:43 )   PT: 12.8 sec;   INR: 1.10 ratio      CARDIAC MARKERS ( 27 Feb 2024 12:43 )  x     / 24.6 ng/L / x     / x     / x        POCT Blood Glucose.: 115 mg/dL (28 Feb 2024 10:06)    Urinalysis Basic - ( 28 Feb 2024 06:25 )    Color: x / Appearance: x / SG: x / pH: x  Gluc: 109 mg/dL / Ketone: x  / Bili: x / Urobili: x   Blood: x / Protein: x / Nitrite: x   Leuk Esterase: x / RBC: x / WBC x   Sq Epi: x / Non Sq Epi: x / Bacteria: x    RADIOLOGY & ADDITIONAL TESTS:    Care Discussed with Consultants/Other Providers:

## 2024-02-28 NOTE — PROGRESS NOTE ADULT - SUBJECTIVE AND OBJECTIVE BOX
ADELAIDE MENDOZA  [id number]      Chief Complaint: Left femoral neck fracture s/p arthroplasty    Interval History: The patient reports feeling well this morning. Denies chest pain or shortness of breath.     Tele: sinus rhythm 70s BPM      Current meds:   acetaminophen     Tablet .. 650 milliGRAM(s) Oral every 6 hours PRN  aluminum hydroxide/magnesium hydroxide/simethicone Suspension 30 milliLiter(s) Oral every 4 hours PRN  aspirin enteric coated 81 milliGRAM(s) Oral every 12 hours  carbidopa/levodopa  25/100 1 Tablet(s) Oral three times a day  ceFAZolin   IVPB 2000 milliGRAM(s) IV Intermittent every 8 hours  lactated ringers. 1000 milliLiter(s) IV Continuous <Continuous>  morphine  - Injectable 2 milliGRAM(s) IV Push every 6 hours PRN  ondansetron Injectable 4 milliGRAM(s) IV Push every 8 hours PRN  oxyCODONE    IR 5 milliGRAM(s) Oral every 6 hours PRN  polyethylene glycol 3350 17 Gram(s) Oral daily PRN  senna 2 Tablet(s) Oral at bedtime      Objective:     Vital Signs:   T(C): 36.2 (02-27-24 @ 22:45), Max: 36.9 (02-27-24 @ 17:00)  HR: 77 (02-27-24 @ 22:45) (65 - 98)  BP: 157/75 (02-27-24 @ 22:45) (105/66 - 185/89)  RR: 14 (02-27-24 @ 22:45) (13 - 19)  SpO2: 96% (02-27-24 @ 22:45) (94% - 100%)  Wt(kg): --    Physical Exam:   General: no distress  HEENT: sclera anicteric  Neck: supple  CVS: JVP ~ 7 cm H20, RRR, s1, s2, no murmurs/rubs/gallops  Chest: unlabored respirations, clear to auscultation b/l  Abdomen: non-distended  Extremities: no lower extremity edema b/l  Neuro: awake, alert & oriented  Psych: normal affect      Labs:   28 Feb 2024 06:25    144    |  106    |  14     ----------------------------<  109    3.7     |  26     |  0.96     Ca    8.9        28 Feb 2024 06:25    TPro  6.6    /  Alb  3.1    /  TBili  0.8    /  DBili  x      /  AST  34     /  ALT  6      /  AlkPhos  51     28 Feb 2024 06:25                          12.4   6.72  )-----------( 193      ( 28 Feb 2024 06:25 )             38.8     PT/INR - ( 27 Feb 2024 12:43 )   PT: 12.8 sec;   INR: 1.10 ratio           ECG (2/27/24): normal sinus rhythm, incomplete RBBB    TTE (2/27/24):   1. Technically difficult study with poor endocardial visualization.   2. Normal global left ventricular systolic function.   3. Left ventricular ejection fraction, by visual estimation, is 55 to 60%.   4. Normal left ventricular internal cavity size.   5. The left ventricle endocardium is not well visualized.   6. Mild aortic valve calcification. No aortic valve stenosis.   7. There is a significant pericardial fat pad present.   8. There is no evidence of pericardial effusion.

## 2024-02-28 NOTE — PROGRESS NOTE ADULT - SUBJECTIVE AND OBJECTIVE BOX
F/U Note:    78y Male admitted POD # 1 from left total hip arthroplasty        Vital Signs Last 24 Hrs  T(C): 37.3 (28 Feb 2024 12:49), Max: 37.3 (28 Feb 2024 12:49)  T(F): 99.1 (28 Feb 2024 12:49), Max: 99.1 (28 Feb 2024 12:49)  HR: 106 (28 Feb 2024 12:49) (70 - 106)  BP: 110/89 (28 Feb 2024 14:29) (110/89 - 179/113)  BP(mean): --  RR: 18 (28 Feb 2024 12:49) (13 - 24)  SpO2: 98% (28 Feb 2024 12:49) (96% - 99%)    Parameters below as of 28 Feb 2024 12:49  Patient On (Oxygen Delivery Method): room air                                12.4   6.72  )-----------( 193      ( 28 Feb 2024 06:25 )             38.8         02-28    144  |  106  |  14  ----------------------------<  109<H>  3.7   |  26  |  0.96    Ca    8.9      28 Feb 2024 06:25    TPro  6.6  /  Alb  3.1<L>  /  TBili  0.8  /  DBili  x   /  AST  34  /  ALT  6<L>  /  AlkPhos  51  02-28        NEURO: no headaches, blurry vision, tremors, depression, anxity  CV: no chest pain, palpitations, murmurs, orthopnea  Resp: no shortness of breath, cough, wheeze, sputum production  GI: no stomach pain,nausea, vomitting, flatulence, hematemesis, hematochezia  PV: no swelling of extremities, no hair loss, no coolness to extremities  ENDO: no polydypsia, polyphagia, polyuria, weight loss, night sweats          NEURO: awake and alert  CV: (+) S1/S2, rrr, no mrg  RESP: CTA b/l  GI: soft, non tender

## 2024-02-28 NOTE — PHYSICAL THERAPY INITIAL EVALUATION ADULT - ORIENTATION, REHAB EVAL
[facility] at [place]. Tried calling the pharmacy, closed. Please verify meds in AM. oriented to person, place, time and situation

## 2024-02-28 NOTE — PHYSICAL THERAPY INITIAL EVALUATION ADULT - PERTINENT HX OF CURRENT PROBLEM, REHAB EVAL
77M PMHx of PD (on carbidopa/levodopa 25/100 TID, f/b Movement Neurologist Dr. Ortega), prior colon CA w/ ostomy, prior prostate CA s/p radiation cystitis, blurry vision 2/2 cataracts baseline, who presents to ED s/p mechanical fall at home. per wife at the bedside pt ambulates without devise in the house, and has had multiple falls due to poor balance from Parkinsons, but usually falls on his buttocks, today he fell on his left side.   he was admitted may/2023 for dysarthia ans cva and sz was ruled out.

## 2024-02-28 NOTE — PROGRESS NOTE ADULT - ASSESSMENT
Assessment:  Kaiden Rodriguez is a 78 year old man with past medical history of Parkinsons Disease with gait instability and falls, CVA, Colon cancer (s/p ostomy) and Prostate cancer (s/p radiation) presented after mechanical fall with resultant left femoral neck fracture.     Cardiology consulted for pre-operative cardiac risk stratification prior to left femoral neck fracture repair. ECG consistent with sinus rhythm and incomplete RBBB, no acute ischemic ST abnormalities and troponin negative. The patient has a limited exercise capacity due to Parkinsons, but denies any recent exertional angina or dyspnea. There are no signs of CHF on exam. There are no malignant arrhythmias on telemetry monitoring.    Recommendations:  [] The patient is now s/p left hip arthroplasty and tolerated procedure well. He denies chest discomfort or shortness of breath. Echo consistent with normal LVEF 55-60%.     The patient is CV stable, we will sign off, please re-consult if needed.     Marylou Fitzpatrick MD  Cardiology

## 2024-02-28 NOTE — PROGRESS NOTE ADULT - ASSESSMENT
78 year old male with hx of Parkinsons, prostate ca , colostomy is surgically stable s/p Left Total Hip Replacement POD #1    Labs stable     Plan:   PT/OT WBAT left leg            DVT prophylaxis             pain management             Total hip precautions             As per medicine

## 2024-02-28 NOTE — PROGRESS NOTE ADULT - ASSESSMENT
77M PMHx of PD (on carbidopa/levodopa 25/100 TID, f/b Movement Neurologist Dr. Ortega), prior colon CA w/ ostomy, prior prostate CA s/p radiation cystitis, blurry vision 2/2 cataracts baseline, who presents to ED s/p mechanical fall at home. per wife at the bedside pt ambulates without devise in the house, and has had multiple falls due to poor balance from Parkinsons, but usually falls on his buttocks, today he fell on his left side.   He was admitted may/2023 for dysarthria and cva and sz was ruled out.     #Left hip fracture s/p fall   - s/p left hip arthoplasty with Dr Diaz on 2/27 - POD 1  - Complete post-op ancef  - Pain control  - IVFs until taking adequate PO  - DVT ppx with aspirin BID  - PT/OT/PMR consult   - Cardiology consulted for clearance   - Surgery following     #Parkinson's disease   - Continue sinemet  - Acute rehab eval     #Constipation  - Bowel regimen     #DVT ppx  - Aspirin BID    Dispo: Pending above     wife Maribeth [phone] 77M PMHx of PD (on carbidopa/levodopa 25/100 TID, f/b Movement Neurologist Dr. Ortega), prior colon CA w/ ostomy, prior prostate CA s/p radiation cystitis, blurry vision 2/2 cataracts baseline, who presents to ED s/p mechanical fall at home. per wife at the bedside pt ambulates without devise in the house, and has had multiple falls due to poor balance from Parkinsons, but usually falls on his buttocks, today he fell on his left side.   He was admitted may/2023 for dysarthria and cva and sz was ruled out.     #Left hip fracture s/p fall   - s/p left hip arthoplasty with Dr Diaz on 2/27 - POD 1  - Complete post-op ancef  - Pain control  - IVFs until taking adequate PO - will give additional 500 ml bolus given syncope/RRT this morning   - DVT ppx with aspirin BID  - PT/OT/PMR consult   - Cardiology consulted for clearance prior to OR, notified re: syncope, will follow up   - Surgery following     #Parkinson's disease   - Continue sinemet  - Acute rehab eval     #Constipation  - Bowel regimen     #DVT ppx  - Aspirin BID    Dispo: Pending above     2/28: Updated wife at bedside

## 2024-02-28 NOTE — PHYSICAL THERAPY INITIAL EVALUATION ADULT - ADDITIONAL COMMENTS
pt lives in condo w/spouse, elevator to apt, no stairs. pt independent w/ambulation in home, uses cane in community. pt has rw and w/c. Spouse assists w/ADL's as needed

## 2024-02-29 ENCOUNTER — TRANSCRIPTION ENCOUNTER (OUTPATIENT)
Age: 79
End: 2024-02-29

## 2024-02-29 LAB
ANION GAP SERPL CALC-SCNC: 9 MMOL/L — SIGNIFICANT CHANGE UP (ref 5–17)
BUN SERPL-MCNC: 16 MG/DL — SIGNIFICANT CHANGE UP (ref 7–23)
CALCIUM SERPL-MCNC: 9.2 MG/DL — SIGNIFICANT CHANGE UP (ref 8.4–10.5)
CHLORIDE SERPL-SCNC: 104 MMOL/L — SIGNIFICANT CHANGE UP (ref 96–108)
CO2 SERPL-SCNC: 27 MMOL/L — SIGNIFICANT CHANGE UP (ref 22–31)
CREAT SERPL-MCNC: 0.82 MG/DL — SIGNIFICANT CHANGE UP (ref 0.5–1.3)
EGFR: 90 ML/MIN/1.73M2 — SIGNIFICANT CHANGE UP
GLUCOSE SERPL-MCNC: 112 MG/DL — HIGH (ref 70–99)
HCT VFR BLD CALC: 36.3 % — LOW (ref 39–50)
HGB BLD-MCNC: 11.8 G/DL — LOW (ref 13–17)
MCHC RBC-ENTMCNC: 28.5 PG — SIGNIFICANT CHANGE UP (ref 27–34)
MCHC RBC-ENTMCNC: 32.5 GM/DL — SIGNIFICANT CHANGE UP (ref 32–36)
MCV RBC AUTO: 87.7 FL — SIGNIFICANT CHANGE UP (ref 80–100)
NRBC # BLD: 0 /100 WBCS — SIGNIFICANT CHANGE UP (ref 0–0)
PLATELET # BLD AUTO: 193 K/UL — SIGNIFICANT CHANGE UP (ref 150–400)
POTASSIUM SERPL-MCNC: 3.8 MMOL/L — SIGNIFICANT CHANGE UP (ref 3.5–5.3)
POTASSIUM SERPL-SCNC: 3.8 MMOL/L — SIGNIFICANT CHANGE UP (ref 3.5–5.3)
RBC # BLD: 4.14 M/UL — LOW (ref 4.2–5.8)
RBC # FLD: 14.3 % — SIGNIFICANT CHANGE UP (ref 10.3–14.5)
SODIUM SERPL-SCNC: 140 MMOL/L — SIGNIFICANT CHANGE UP (ref 135–145)
WBC # BLD: 7.61 K/UL — SIGNIFICANT CHANGE UP (ref 3.8–10.5)
WBC # FLD AUTO: 7.61 K/UL — SIGNIFICANT CHANGE UP (ref 3.8–10.5)

## 2024-02-29 PROCEDURE — 99233 SBSQ HOSP IP/OBS HIGH 50: CPT

## 2024-02-29 RX ORDER — OXYCODONE HYDROCHLORIDE 5 MG/1
5 TABLET ORAL EVERY 6 HOURS
Refills: 0 | Status: DISCONTINUED | OUTPATIENT
Start: 2024-02-29 | End: 2024-03-01

## 2024-02-29 RX ORDER — ACETAMINOPHEN 500 MG
650 TABLET ORAL EVERY 8 HOURS
Refills: 0 | Status: DISCONTINUED | OUTPATIENT
Start: 2024-02-29 | End: 2024-03-01

## 2024-02-29 RX ORDER — ENOXAPARIN SODIUM 100 MG/ML
40 INJECTION SUBCUTANEOUS EVERY 24 HOURS
Refills: 0 | Status: DISCONTINUED | OUTPATIENT
Start: 2024-02-29 | End: 2024-03-01

## 2024-02-29 RX ORDER — OXYCODONE HYDROCHLORIDE 5 MG/1
2.5 TABLET ORAL EVERY 6 HOURS
Refills: 0 | Status: DISCONTINUED | OUTPATIENT
Start: 2024-02-29 | End: 2024-03-01

## 2024-02-29 RX ADMIN — Medication 81 MILLIGRAM(S): at 05:41

## 2024-02-29 RX ADMIN — Medication 650 MILLIGRAM(S): at 15:07

## 2024-02-29 RX ADMIN — Medication 650 MILLIGRAM(S): at 21:41

## 2024-02-29 RX ADMIN — CARBIDOPA AND LEVODOPA 1 TABLET(S): 25; 100 TABLET ORAL at 21:41

## 2024-02-29 RX ADMIN — Medication 650 MILLIGRAM(S): at 14:07

## 2024-02-29 RX ADMIN — Medication 650 MILLIGRAM(S): at 22:41

## 2024-02-29 RX ADMIN — CARBIDOPA AND LEVODOPA 1 TABLET(S): 25; 100 TABLET ORAL at 14:07

## 2024-02-29 RX ADMIN — ENOXAPARIN SODIUM 40 MILLIGRAM(S): 100 INJECTION SUBCUTANEOUS at 14:08

## 2024-02-29 RX ADMIN — SENNA PLUS 2 TABLET(S): 8.6 TABLET ORAL at 21:41

## 2024-02-29 RX ADMIN — CARBIDOPA AND LEVODOPA 1 TABLET(S): 25; 100 TABLET ORAL at 05:41

## 2024-02-29 NOTE — PROGRESS NOTE ADULT - SUBJECTIVE AND OBJECTIVE BOX
Patient is a 78y old  Male who presents with a chief complaint of fall, left hip pain (28 Feb 2024 19:44)      Patient seen and examined at bedside.    ALLERGIES:  No Known Allergies    MEDICATIONS  (STANDING):  aspirin enteric coated 81 milliGRAM(s) Oral every 12 hours  carbidopa/levodopa  25/100 1 Tablet(s) Oral three times a day  senna 2 Tablet(s) Oral at bedtime    MEDICATIONS  (PRN):  acetaminophen     Tablet .. 650 milliGRAM(s) Oral every 6 hours PRN Temp greater or equal to 38C (100.4F), Mild Pain (1 - 3)  aluminum hydroxide/magnesium hydroxide/simethicone Suspension 30 milliLiter(s) Oral every 4 hours PRN Dyspepsia  morphine  - Injectable 2 milliGRAM(s) IV Push every 6 hours PRN Severe Pain (7 - 10)  ondansetron Injectable 4 milliGRAM(s) IV Push every 8 hours PRN Nausea and/or Vomiting  oxyCODONE    IR 5 milliGRAM(s) Oral every 6 hours PRN Moderate Pain (4 - 6)  polyethylene glycol 3350 17 Gram(s) Oral daily PRN Constipation    Vital Signs Last 24 Hrs  T(F): 99.5 (29 Feb 2024 05:00), Max: 99.5 (29 Feb 2024 05:00)  HR: 79 (29 Feb 2024 05:00) (79 - 106)  BP: 131/88 (29 Feb 2024 05:00) (110/89 - 179/113)  RR: 15 (29 Feb 2024 05:00) (15 - 24)  SpO2: 96% (29 Feb 2024 05:00) (95% - 99%)  I&O's Summary    28 Feb 2024 07:01  -  29 Feb 2024 07:00  --------------------------------------------------------  IN: 0 mL / OUT: 300 mL / NET: -300 mL      PHYSICAL EXAM:  General: NAD, A/O x 3  ENT: MMM  Neck: Supple, No JVD  Lungs: Clear to auscultation bilaterally, Non labored breathing   Cardio: RRR, S1/S2, No murmurs  Abdomen: Soft, Nontender, Nondistended; Bowel sounds present  Extremities: No calf tenderness, No pitting edema    LABS:                        12.4   6.72  )-----------( 193      ( 28 Feb 2024 06:25 )             38.8     02-28    144  |  106  |  14  ----------------------------<  109  3.7   |  26  |  0.96    Ca    8.9      28 Feb 2024 06:25    TPro  6.6  /  Alb  3.1  /  TBili  0.8  /  DBili  x   /  AST  34  /  ALT  6   /  AlkPhos  51  02-28      PT/INR - ( 27 Feb 2024 12:43 )   PT: 12.8 sec;   INR: 1.10 ratio             CARDIAC MARKERS ( 27 Feb 2024 12:43 )  x     / 24.6 ng/L / x     / x     / x                        POCT Blood Glucose.: 115 mg/dL (28 Feb 2024 10:06)      Urinalysis Basic - ( 28 Feb 2024 06:25 )    Color: x / Appearance: x / SG: x / pH: x  Gluc: 109 mg/dL / Ketone: x  / Bili: x / Urobili: x   Blood: x / Protein: x / Nitrite: x   Leuk Esterase: x / RBC: x / WBC x   Sq Epi: x / Non Sq Epi: x / Bacteria: x          RADIOLOGY & ADDITIONAL TESTS:    Care Discussed with Consultants/Other Providers:    Patient is a 78y old  Male who presents with a chief complaint of fall, left hip pain (28 Feb 2024 19:44)      Patient seen and examined at bedside. Pt without complaints of pain.  No events over night     ALLERGIES:  No Known Allergies    MEDICATIONS  (STANDING):  aspirin enteric coated 81 milliGRAM(s) Oral every 12 hours  carbidopa/levodopa  25/100 1 Tablet(s) Oral three times a day  senna 2 Tablet(s) Oral at bedtime    MEDICATIONS  (PRN):  acetaminophen     Tablet .. 650 milliGRAM(s) Oral every 6 hours PRN Temp greater or equal to 38C (100.4F), Mild Pain (1 - 3)  aluminum hydroxide/magnesium hydroxide/simethicone Suspension 30 milliLiter(s) Oral every 4 hours PRN Dyspepsia  morphine  - Injectable 2 milliGRAM(s) IV Push every 6 hours PRN Severe Pain (7 - 10)  ondansetron Injectable 4 milliGRAM(s) IV Push every 8 hours PRN Nausea and/or Vomiting  oxyCODONE    IR 5 milliGRAM(s) Oral every 6 hours PRN Moderate Pain (4 - 6)  polyethylene glycol 3350 17 Gram(s) Oral daily PRN Constipation    Vital Signs Last 24 Hrs  T(F): 99.5 (29 Feb 2024 05:00), Max: 99.5 (29 Feb 2024 05:00)  HR: 79 (29 Feb 2024 05:00) (79 - 106)  BP: 131/88 (29 Feb 2024 05:00) (110/89 - 179/113)  RR: 15 (29 Feb 2024 05:00) (15 - 24)  SpO2: 96% (29 Feb 2024 05:00) (95% - 99%)  I&O's Summary    28 Feb 2024 07:01  -  29 Feb 2024 07:00  --------------------------------------------------------  IN: 0 mL / OUT: 300 mL / NET: -300 mL      PHYSICAL EXAM:  General:79 y/o male in  NAD, A/O x 3  ENT: MMM  Neck: Supple, No JVD  Lungs: Clear to auscultation bilaterally, Non labored breathing   Cardio: RRR, S1/S2, No murmurs  Abdomen: Soft, Nontender, Nondistended; Bowel sounds present  Extremities: No calf tenderness, No pitting edema S/p hip orif     LABS:                        12.4   6.72  )-----------( 193      ( 28 Feb 2024 06:25 )             38.8     02-28    144  |  106  |  14  ----------------------------<  109  3.7   |  26  |  0.96    Ca    8.9      28 Feb 2024 06:25    TPro  6.6  /  Alb  3.1  /  TBili  0.8  /  DBili  x   /  AST  34  /  ALT  6   /  AlkPhos  51  02-28      PT/INR - ( 27 Feb 2024 12:43 )   PT: 12.8 sec;   INR: 1.10 ratio             CARDIAC MARKERS ( 27 Feb 2024 12:43 )  x     / 24.6 ng/L / x     / x     / x                        POCT Blood Glucose.: 115 mg/dL (28 Feb 2024 10:06)      Urinalysis Basic - ( 28 Feb 2024 06:25 )    Color: x / Appearance: x / SG: x / pH: x  Gluc: 109 mg/dL / Ketone: x  / Bili: x / Urobili: x   Blood: x / Protein: x / Nitrite: x   Leuk Esterase: x / RBC: x / WBC x   Sq Epi: x / Non Sq Epi: x / Bacteria: x          RADIOLOGY & ADDITIONAL TESTS:    Care Discussed with Consultants/Other Providers:

## 2024-02-29 NOTE — DISCHARGE NOTE PROVIDER - NSDCFUSCHEDAPPT_GEN_ALL_CORE_FT
Ray Ortega  [facility]  NEUROLOGY [address] [facility]  Scheduled Appointment: 04/10/2024

## 2024-02-29 NOTE — OCCUPATIONAL THERAPY INITIAL EVALUATION ADULT - GENERAL OBSERVATIONS, REHAB EVAL
Pt received supine in bed +PIV, cardiac monitor, condom catheter. Pt's wife present through OT evaluation and assisted in questions. RN made aware of orthostatics. Pt left supine in bed with wife present, call bell in reach, abduction pillow placed. All lines intact except condom catheter which became not intact during transfers. RN aware and to reattach.,

## 2024-02-29 NOTE — DISCHARGE NOTE PROVIDER - NSDCMRMEDTOKEN_GEN_ALL_CORE_FT
Sinemet 25 mg-100 mg oral tablet: 1 orally 3 times a day   acetaminophen 325 mg oral tablet: 2 tab(s) orally every 8 hours  oxyCODONE 5 mg oral tablet: 1 tab(s) orally every 6 hours As needed Severe Pain (7 - 10)  polyethylene glycol 3350 oral powder for reconstitution: 17 gram(s) orally once a day As needed Constipation  senna leaf extract oral tablet: 2 tab(s) orally once a day (at bedtime)  Sinemet 25 mg-100 mg oral tablet: 1 orally 3 times a day   acetaminophen 325 mg oral tablet: 2 tab(s) orally every 8 hours  Lovenox 40 mg/0.4 mL injectable solution: 40 milligram(s) injectable once a day  polyethylene glycol 3350 oral powder for reconstitution: 17 gram(s) orally once a day As needed Constipation  senna leaf extract oral tablet: 2 tab(s) orally once a day (at bedtime)  Sinemet 25 mg-100 mg oral tablet: 1 orally 3 times a day

## 2024-02-29 NOTE — DISCHARGE NOTE PROVIDER - CARE PROVIDER_API CALL
Sonido Alvarez.  [facility]  [address] [place] [address]  Phone: [phone]  Fax: [phone]  Follow Up Time:    Sonido Alvarez  [facility]  [address] [place] [address]  Phone: [phone]  Fax: [phone]  Follow Up Time:     Adonis Diaz  Orthopaedic Surgery  [address] [place] [address], [facility] [address]  [place] [address]  Phone: [phone]  Fax: [phone]  Follow Up Time:

## 2024-02-29 NOTE — DISCHARGE NOTE PROVIDER - HOSPITAL COURSE
Hospital Course  HPI:  77M PMHx of PD (on carbidopa/levodopa 25/100 TID, f/b Movement Neurologist Dr. Ortega), prior colon CA w/ ostomy, prior prostate CA s/p radiation cystitis, blurry vision 2/2 cataracts baseline, who presents to ED s/p mechanical fall at home. per wife at the bedside pt ambulates without devise in the house, and has had multiple falls due to poor balance from Parkinsons, but usually falls on his buttocks, today he fell on his left side.   he was admitted may/2023 for dysarthia ans cva and sz was ruled out.   echo reviewed,  cardio consulted for clearence   Pt taken to OR by ortho for ORIF of left hip, post op pt had a syncopal episode - mostlikely vasovagal vs orthostatic. Resolved.   Pt evaluated by PT and OT recommanded acute rehab referral.      You were admitted for pain in Left hip after fall   You were diagnosed with Left hip fracture   You were treated with surgical repair of hip fracture   You were prescribed the following new medications:    You will need to follow up with your primary care physician.    Source of Infection: NA  Antibiotic / Last Day: NA    Palliative Care / Advanced Care Planning  Code Status: full code   Patient/Family agreeable to Hospice/Palliative (N)  Summary of Goals of Care Conversation:    Discharging Provider:    Contact Info: Cell  - Please call with any questions or concerns.    Outpatient Provider:  Dr Alvarez        Hospital Course  HPI:  77M PMHx of PD (on carbidopa/levodopa 25/100 TID, f/b Movement Neurologist Dr. Ortega), prior colon CA w/ ostomy, prior prostate CA s/p radiation cystitis, blurry vision 2/2 cataracts baseline, who presents to ED s/p mechanical fall at home. per wife at the bedside pt ambulates without devise in the house, and has had multiple falls due to poor balance from Parkinsons, but usually falls on his buttocks, today he fell on his left side.   he was admitted may/2023 for dysarthia ans cva and sz was ruled out.   echo reviewed,  cardio consulted for clearence   Pt taken to OR by ortho for ORIF of left hip, post op pt had a syncopal episode - mostlikely vasovagal vs orthostatic. Resolved.   Pt evaluated by PT and OT recommended acute rehab referral.  PMR accepted patient for acute rehab.     You were admitted for pain in Left hip after fall   You were diagnosed with Left hip fracture   You were treated with surgical repair of hip fracture   You were prescribed the following new medications:    You will need to follow up with your primary care physician.    Source of Infection: NA  Antibiotic / Last Day: NA    Palliative Care / Advanced Care Planning  Code Status: full code   Patient/Family agreeable to Hospice/Palliative (N)  Summary of Goals of Care Conversation:    Discharging Provider:  Melisa Cardenas NP  Contact Info:  [phone]. Please call with any questions or concerns.    Outpatient Provider:  Dr Alvarez          Hospital Course  HPI:  77M PMHx of PD (on carbidopa/levodopa 25/100 TID, f/b Movement Neurologist Dr. Ortega), prior colon CA w/ ostomy, prior prostate CA s/p radiation cystitis, blurry vision 2/2 cataracts baseline, who presents to ED s/p mechanical fall at home. per wife at the bedside pt ambulates without devise in the house, and has had multiple falls due to poor balance from Parkinsons, but usually falls on his buttocks, today he fell on his left side.   he was admitted may/2023 for dysarthia ans cva and sz was ruled out.   echo reviewed,  cardio consulted for clearence   Pt taken to OR by ortho for ORIF of left hip, post op pt had a syncopal episode - most likely vasovagal vs orthostatic. Resolved.   Pt evaluated by PT and OT recommended acute rehab referral.  PMR accepted patient for acute rehab.     You were admitted for pain in Left hip after fall   You were diagnosed with Left hip fracture   You were treated with surgical repair of hip fracture   You were prescribed the following new medications:    You will need to follow up with your primary care physician.    Source of Infection: NA  Antibiotic / Last Day: NA    Palliative Care / Advanced Care Planning  Code Status: full code   Patient/Family agreeable to Hospice/Palliative (N)  Summary of Goals of Care Conversation:    Discharging Provider:  Melisa Cardenas NP  Contact Info:  [phone]. Please call with any questions or concerns.    Outpatient Provider:  Dr Alvarez

## 2024-02-29 NOTE — DISCHARGE NOTE PROVIDER - NSDCCPCAREPLAN_GEN_ALL_CORE_FT
PRINCIPAL DISCHARGE DIAGNOSIS  Diagnosis: Hip fracture, left  Assessment and Plan of Treatment:      PRINCIPAL DISCHARGE DIAGNOSIS  Diagnosis: Hip fracture, left  Assessment and Plan of Treatment: S/p ORIF by ortho. You are being discharged to acute rehab for therapy      SECONDARY DISCHARGE DIAGNOSES  Diagnosis: Parkinsons  Assessment and Plan of Treatment: c/w sinemet    Diagnosis: Orthostatic hypotension  Assessment and Plan of Treatment: Likely due to dehydration and also due to autonomic instability from parkinsons. Now resolved.

## 2024-02-29 NOTE — GOALS OF CARE CONVERSATION - ADVANCED CARE PLANNING - CONVERSATION DETAILS
Met with patient and his wife Maribeth at bedside. Pt. is here s/p fall at home and surgical repair of left femur fx. He is A&Ox3. He has Parkinsons disease, speech is slow and slightly garbled. He also has a hx. of prostate ca., and colectomy.   Wife stated she is the HCP, and their son Maxine is the alternate agent. Form at home.  I asked the patient about GOC. Pt. initially stated he would not want attempted resuscitation or intubation in the event of cardiac arrest, but would prefer to allow natural death. However, wife did not want to have patient sign MOLST without speaking privately with the patient, and also including their children in the discussion. I offered to send wife the ACP CPR video, but she declined. I left her a blank MOLST form to review. Also, I offered her the Caregiver Support group which meets monthly. She states they live in [place] and she does not drive. I will give her information about zoom-based support group offered ar [facility].  Pt. remains Full Code.

## 2024-02-29 NOTE — OCCUPATIONAL THERAPY INITIAL EVALUATION ADULT - ADDITIONAL COMMENTS
Following information obtained from pt and pt's wife. pt lives in condo w/spouse, elevator to apt, no stairs. pt independent w/ambulation in home, uses cane in community. pt has rw, raised toilet seat, grab bars, shower chair and w/c. Spouse reports pt was mostly independent with BADLs however, intermittently required assistance from spouse who is home with pt. Pt reports walk in shower +grab bars

## 2024-02-29 NOTE — PROGRESS NOTE ADULT - ASSESSMENT
77M PMHx of PD (on carbidopa/levodopa 25/100 TID, f/b Movement Neurologist Dr. Ortega), prior colon CA w/ ostomy, prior prostate CA s/p radiation cystitis, blurry vision 2/2 cataracts baseline, who presents to ED s/p mechanical fall at home. per wife at the bedside pt ambulates without devise in the house, and has had multiple falls due to poor balance from Parkinsons, but usually falls on his buttocks, today he fell on his left side.   He was admitted may/2023 for dysarthria and cva and sz was ruled out.     #Left hip fracture s/p fall   - s/p left hip arthoplasty with Dr Diaz on 2/27 - POD 2  - Complete post-op ancef  - Pain control  - IVFs until taking adequate PO   - DVT ppx with aspirin BID  - PT/OT/PMR consult   - Cardiology consulted cleared for procedure-- pt cardio stable - signed off   - Surgery following     #Parkinson's disease   - Continue sinemet  - Acute rehab eval     #Constipation  - Bowel regimen     #DVT ppx  - Aspirin BID    Dispo: Pending above     2/28: Updated wife at bedside  77M PMHx of PD (on carbidopa/levodopa 25/100 TID, f/b Movement Neurologist Dr. Ortega), prior colon CA w/ ostomy, prior prostate CA s/p radiation cystitis, blurry vision 2/2 cataracts baseline, who presents to ED s/p mechanical fall at home. per wife at the bedside pt ambulates without devise in the house, and has had multiple falls due to poor balance from Parkinsons, but usually falls on his buttocks, today he fell on his left side.   He was admitted may/2023 for dysarthria and cva and sz was ruled out.     #Left hip fracture s/p fall   POD 2   - s/p left hip arthoplasty with Dr Diaz on 2/27 - POD 2  - Complete post-op ancef  - Pain control   - DVT ppx discussed with ortho will change asa to lovenox due to decreased mobilization   - PT/OT/PMR consult   - Cardiology consulted cleared for procedure-- pt cardio stable - signed off   - Surgery following     #Parkinson's disease   - Continue sinemet  - Acute rehab eval pending     #Constipation  - Bowel regimen     #DVT ppx  - Aspirin BID    Dispo: Pending above     2/29 : Updated wife and son at bedside

## 2024-02-29 NOTE — DISCHARGE NOTE PROVIDER - NSDCFUADDINST_GEN_ALL_CORE_FT
Ensure Surgery Cans or Servings Per Day:  1       Frequency:  Three Times a day  Left Leg- Weight bearing as tolerated. Total Hip precautions, abduction pillow.   Lovenox for DVT prophylaxis while inpatient, then convert to Aspirin 81 mg twice a day when discharged to home to complete course.      Left Leg- Weight bearing as tolerated. Total Hip precautions, abduction pillow.   Lovenox for DVT prophylaxis while inpatient, then convert to Aspirin 81 mg twice a day when discharged to home to complete course (6 weeks).

## 2024-02-29 NOTE — OCCUPATIONAL THERAPY INITIAL EVALUATION ADULT - PLANNED THERAPY INTERVENTIONS, OT EVAL
ADL retraining/balance training/bed mobility training/cognitive, visual perceptual/fine motor coordination training/joint mobilization/massage/motor coordination training/neuromuscular re-education/parent/caregiver training.../stretching/transfer training

## 2024-02-29 NOTE — OCCUPATIONAL THERAPY INITIAL EVALUATION ADULT - LEVEL OF INDEPENDENCE: TOILET, REHAB EVAL
[facility]  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  Last week, she presented with new onset of shortness of breath.  She was hospitalized.  A CT scan of the chest showed no evidence for pulmonary emboli.  There was new interstitial edema and bilateral pleural effusions worrisome for lymphangitic tumor.  Her proBNP was significantly elevated.  An echocardiogram showed well-preserved left ventricular function.  She underwent a thoracentesis.  Cytology is positive for malignant cells which are ER positive.    SOCIAL HISTORY:       FAMILY HISTORY:      CURRENT MEDICATIONS:   Drug Name Dose Route Frequency   Vitamin D 2,000 Units PO QD   Cogentin 1 mg PO As needed   Levoxyl 0.5 mcg PO QD   Multiple Vitamins 800 mg PO QD   Warfarin   Oral Daily   furosemide 1 Tablet Oral Daily   Xgeva 120 mg SQ subcutaneous every month   Haldol 0.5 mg Oral     omeprazole 1 Capsule Oral Daily   warfarin 1 - 2 Tablet  Oral as directed by MD     REVIEW OF SYSTEMS: As above.     PERFORMANCE STATUS: ECOG Score 0.     DISTRESS CODE: Not done today.    PHYSICAL EXAMINATION:   GENERAL: She appears fatigued.    VITAL SIGNS: Blood pressure 142/82, weight 210 pounds.  She has lost 10 pounds since her last visit.  HEENT: Anicteric.  No stomatitis. Oral mucous membranes are moist. I saw no stomatitis or thrush.   NECK: Supple.  No cervical or supraclavicular adenopathy.  I could feel no  axillary adenopathy.  BREAST: Right mastectomy site well healed. There is some telangiectasia in the right lateral chest and axilla.  The left breast showed no mass.  LUNGS: Breath sounds are mildly diminished bilaterally.  I heard no wheezing.  CARDIAC: Regular rhythm, S1 and S2 are normal.  There is a Port-A-Cath in place on the left.  The site appears clean.  ABDOMEN: Obese and soft.  I felt no abdominal mass.   : Deferred  EXTREMITIES: Trace ankle edema bilaterally.   NEURO: She is alert and well oriented.                PAIN ASSESSMENT: She denies pain today.  EMOTIONAL/PSYCH: She is in good spirits today.  I reviewed her distress.  She rated her distress at 6.  I discussed her distress score with her.  She is having no specific issues.  She was concerned about the results of her cytology.      LABORATORY DATA:   Date 1/30/2017   CBC       WBC (10^3/uL) [C] 6.9     NEUT% (%) [C] 72.1     NEUT (10^3/uL) [C] 5.0     HGB (g/dL) [C] 10.5 L     HCT (%) [C] 33.2 L     PLT (10^3/uL) [C] 464.0 H   Chemistry       SODIUM (mmol/L) [C] 137     POTASSIUM (mmol/L) [C] 4.3     GLUCOSE (mg/dL) [C] 90     UREA NITROGEN (BUN) (mg/dL) [C] 10     CREATININE (mg/dL) [C] 0.84     AST (Unit/l) [C] 35     ALT (Unit/l) [C] 43 H     CALCIUM (mg/dL) [C] 9.4     ALKALINE PHOSPHATASE (Unit/l) [C] 55   Other Labs       RBC (10^6/uL) [C] 3.86 L     LYMPH (10^3/uL) [C] 0.8     LYMPH% (%) [C] 10.9 L     MONO% (%) [C] 15.2 H     EOS (10^3/uL) [C] 0.1     CHLORIDE (mmol/L) [C] 98      BILIRUBIN, TOTAL (mg/dL) [C] 0.4     MONO (10^3/uL) [C] 1.1 H     EOS% (%) [C] 1.5     BASO% (%) [C] 0.3     BASO (10^3/uL) [C] 0.0     MCV (fml) [C] 86.0     MCH (pg) [C] 27.2     eGFR NON-AFR. AMERICAN (mL/min/1.73m2) [C] 72     eGFR AFRICAN AMERICAN (mL/min/1.73m2) [C] 83     BUN/CREATININE RATIO ((calc)) [C] NOT APPLICABLE     CARBON DIOXIDE (mmol/L) [C] 28     PROTEIN, TOTAL (g/dL) [C] 6.4     ALBUMIN (g/dL) [C] 3.7     GLOBULIN (g/dL (calc)) [C] 2.7     ALBUMIN/GLOBULIN RATIO ((calc)) [C] 1.4     CA 27.29 (Unit/ml) 2224 H     MCHC (g/dL) [C] 31.6     RDW-SD (fml) [C] 68.7 H     RDW-CV (%) [C] 22.8     MPV (fml) [C] 8.9   Her INR today is 2.0.    DIAGNOSTIC TESTS: Her pleural fluid cytology is positive for metastatic carcinoma which is estrogen receptor positive.      ASSESSMENT: 1. Metastatic breast cancer.  Her disease appears to be limited to the bone.  Her disease progressed on exemestane and everolimus.  Her disease has progressed on fulvestrant.  She had progressive disease after a good response to capecitabine.  She had a long period of stable disease with weekly vinorelbine well being.  She has now progressed.    2. 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  She had an episode of upper gastrointestinal hemorrhage while on rivaroxaban.  She was found to have a Dilieufoy lesion in the stomach which was cauterized and clipped.  Her rivaroxaban was discontinued. She has been on warfarin without further bleeding problems.  3.  New pleural effusions and increased interstitial markings consistent with lymphangitic spread of tumor.    PLAN: 1. Continue monthly denosumab.  Discontinue vinorelbine.  We’ll begin salvage treatment with docetaxel 35 mg/m² IV on days 1, 8, and 15 of a 4 week treatment cycle.  I discussed the potential toxicities of therapy with the patient and her .  We discussed the non-curative nature of the therapy.  We’ll check her CA 27.29 today  for baseline.  She should have a bone scan to screen for long bone disease.  Recheck her INR today.  Start her chemotherapy next week.        Electronically signed  Duke Oro M.D.  January 30, 2017, 4:50 PM   Cc: Dr. GEMA Vasquez     moderate assist (50% patients effort)

## 2024-02-29 NOTE — OCCUPATIONAL THERAPY INITIAL EVALUATION ADULT - PERTINENT HX OF CURRENT PROBLEM, REHAB EVAL
77M PMHx of PD (on carbidopa/levodopa 25/100 TID, f/b Movement Neurologist Dr. Ortega), prior colon CA w/ ostomy, prior prostate CA s/p radiation cystitis, blurry vision 2/2 cataracts baseline, who presents to ED s/p mechanical fall at home. per wife at the bedside pt ambulates without devise in the house, and has had multiple falls due to poor balance from Parkinsons, but usually falls on his buttocks, today he fell on his left side.   He was admitted may/2023 for dysarthria and cva and sz was ruled out.     Pt now s/p left hip arthroplasty hard copy, drawn during this pregnancy

## 2024-02-29 NOTE — DISCHARGE NOTE PROVIDER - CARE PROVIDERS DIRECT ADDRESSES
,DirectAddress_Unknown ,DirectAddress_Unknown,[email or web address]

## 2024-02-29 NOTE — PROGRESS NOTE ADULT - ASSESSMENT
78 year old male with hx of Parkinsons , prostate ca , colostomy is ortho stable s/p left THR POD #2    Labs stable   Discussed with Dr Diaz    Plan:  continue PT /OT WBAT left leg            Lovenox for DVT prophylaxis while in patient to convert to asa 81 mg bid when d/c home           pain management with tylenol , oxy for severe            total hip precautions            awaiting eval for acute rehab            medical management as per hospitalist s service

## 2024-02-29 NOTE — PROGRESS NOTE ADULT - SUBJECTIVE AND OBJECTIVE BOX
Patient is a 78y old  Male who presents with a chief complaint of fall, left hip pain (29 Feb 2024 07:14)  S/P Left THR POD #2    Patient seen and examined at bedside., wife by his side.   Today he was more alert and able to stand with PT ,   He is now tired and resting.   He denies any surgical discomfort .   He is tolerating a diet although appetite not   great.         ALLERGIES:  No Known Allergies    MEDICATIONS  (STANDING):  acetaminophen     Tablet .. 650 milliGRAM(s) Oral every 8 hours  carbidopa/levodopa  25/100 1 Tablet(s) Oral three times a day  enoxaparin Injectable 40 milliGRAM(s) SubCutaneous every 24 hours  senna 2 Tablet(s) Oral at bedtime    MEDICATIONS  (PRN):  aluminum hydroxide/magnesium hydroxide/simethicone Suspension 30 milliLiter(s) Oral every 4 hours PRN Dyspepsia  ondansetron Injectable 4 milliGRAM(s) IV Push every 8 hours PRN Nausea and/or Vomiting  oxyCODONE    IR 2.5 milliGRAM(s) Oral every 6 hours PRN Moderate Pain (4 - 6)  oxyCODONE    IR 5 milliGRAM(s) Oral every 6 hours PRN Severe Pain (7 - 10)  polyethylene glycol 3350 17 Gram(s) Oral daily PRN Constipation    Vital Signs Last 24 Hrs  T(F): 99.5 (29 Feb 2024 05:00), Max: 99.5 (29 Feb 2024 05:00)  HR: 75 (29 Feb 2024 11:24) (75 - 80)  BP: 123/72 (29 Feb 2024 11:24) (120/64 - 131/88)  RR: 15 (29 Feb 2024 05:00) (15 - 22)  SpO2: 96% (29 Feb 2024 11:24) (95% - 96%)  I&O's Summary    28 Feb 2024 07:01  -  29 Feb 2024 07:00  --------------------------------------------------------  IN: 0 mL / OUT: 300 mL / NET: -300 mL      PHYSICAL EXAM:  General: NAD, A/O x 3  ENT: MMM  Neck: Supple, No JVD  Lungs: Clear to auscultation bilaterally  Cardio: RRR, S1/S2, No murmurs  Abdomen: Soft, Nontender, Nondistended; colostomy in place - small trace of stool seen, voiding adequately   Extremities: No calf tenderness, No pitting edema right leg  Left hip dressing clean and intact , thigh supple , + neurovascular intact   no ecchymosis or erythema noted, abduction pillow in place     LABS:                        11.8   7.61  )-----------( 193      ( 29 Feb 2024 07:30 )             36.3     02-29    140  |  104  |  16  ----------------------------<  112  3.8   |  27  |  0.82    Ca    9.2      29 Feb 2024 07:30    TPro  6.6  /  Alb  3.1  /  TBili  0.8  /  DBili  x   /  AST  34  /  ALT  6   /  AlkPhos  51  02-28      PT/INR - ( 27 Feb 2024 12:43 )   PT: 12.8 sec;   INR: 1.10 ratio             CARDIAC MARKERS ( 27 Feb 2024 12:43 )  x     / 24.6 ng/L / x     / x     / x        Urinalysis Basic - ( 29 Feb 2024 07:30 )    Color: x / Appearance: x / SG: x / pH: x  Gluc: 112 mg/dL / Ketone: x  / Bili: x / Urobili: x   Blood: x / Protein: x / Nitrite: x   Leuk Esterase: x / RBC: x / WBC x   Sq Epi: x / Non Sq Epi: x / Bacteria: x      RADIOLOGY & ADDITIONAL TESTS:    Care Discussed with Consultants/Other Providers:

## 2024-03-01 ENCOUNTER — INPATIENT (INPATIENT)
Facility: HOSPITAL | Age: 79
LOS: 13 days | Discharge: HOME HEALTH | DRG: 560 | End: 2024-03-15
Attending: PSYCHIATRY & NEUROLOGY | Admitting: PSYCHIATRY & NEUROLOGY
Payer: MEDICARE

## 2024-03-01 ENCOUNTER — TRANSCRIPTION ENCOUNTER (OUTPATIENT)
Age: 79
End: 2024-03-01

## 2024-03-01 VITALS
DIASTOLIC BLOOD PRESSURE: 69 MMHG | HEART RATE: 77 BPM | SYSTOLIC BLOOD PRESSURE: 130 MMHG | HEIGHT: 69 IN | RESPIRATION RATE: 16 BRPM | WEIGHT: 185.41 LBS | OXYGEN SATURATION: 94 %

## 2024-03-01 VITALS — DIASTOLIC BLOOD PRESSURE: 63 MMHG | HEART RATE: 77 BPM | SYSTOLIC BLOOD PRESSURE: 133 MMHG

## 2024-03-01 DIAGNOSIS — S72.002A FRACTURE OF UNSPECIFIED PART OF NECK OF LEFT FEMUR, INITIAL ENCOUNTER FOR CLOSED FRACTURE: ICD-10-CM

## 2024-03-01 DIAGNOSIS — S72.146A NONDISPLACED INTERTROCHANTERIC FRACTURE OF UNSPECIFIED FEMUR, INITIAL ENCOUNTER FOR CLOSED FRACTURE: ICD-10-CM

## 2024-03-01 DIAGNOSIS — Z90.49 ACQUIRED ABSENCE OF OTHER SPECIFIED PARTS OF DIGESTIVE TRACT: Chronic | ICD-10-CM

## 2024-03-01 LAB
ANION GAP SERPL CALC-SCNC: 7 MMOL/L — SIGNIFICANT CHANGE UP (ref 5–17)
BUN SERPL-MCNC: 22 MG/DL — SIGNIFICANT CHANGE UP (ref 7–23)
CALCIUM SERPL-MCNC: 9.1 MG/DL — SIGNIFICANT CHANGE UP (ref 8.4–10.5)
CHLORIDE SERPL-SCNC: 104 MMOL/L — SIGNIFICANT CHANGE UP (ref 96–108)
CO2 SERPL-SCNC: 30 MMOL/L — SIGNIFICANT CHANGE UP (ref 22–31)
CREAT SERPL-MCNC: 0.9 MG/DL — SIGNIFICANT CHANGE UP (ref 0.5–1.3)
EGFR: 87 ML/MIN/1.73M2 — SIGNIFICANT CHANGE UP
FLUAV AG NPH QL: SIGNIFICANT CHANGE UP
FLUBV AG NPH QL: SIGNIFICANT CHANGE UP
GLUCOSE SERPL-MCNC: 103 MG/DL — HIGH (ref 70–99)
HCT VFR BLD CALC: 36.9 % — LOW (ref 39–50)
HGB BLD-MCNC: 11.7 G/DL — LOW (ref 13–17)
MCHC RBC-ENTMCNC: 28.3 PG — SIGNIFICANT CHANGE UP (ref 27–34)
MCHC RBC-ENTMCNC: 31.7 GM/DL — LOW (ref 32–36)
MCV RBC AUTO: 89.3 FL — SIGNIFICANT CHANGE UP (ref 80–100)
NRBC # BLD: 0 /100 WBCS — SIGNIFICANT CHANGE UP (ref 0–0)
PLATELET # BLD AUTO: 191 K/UL — SIGNIFICANT CHANGE UP (ref 150–400)
POTASSIUM SERPL-MCNC: 4.2 MMOL/L — SIGNIFICANT CHANGE UP (ref 3.5–5.3)
POTASSIUM SERPL-SCNC: 4.2 MMOL/L — SIGNIFICANT CHANGE UP (ref 3.5–5.3)
RBC # BLD: 4.13 M/UL — LOW (ref 4.2–5.8)
RBC # FLD: 14.4 % — SIGNIFICANT CHANGE UP (ref 10.3–14.5)
RSV RNA NPH QL NAA+NON-PROBE: SIGNIFICANT CHANGE UP
SARS-COV-2 RNA SPEC QL NAA+PROBE: SIGNIFICANT CHANGE UP
SODIUM SERPL-SCNC: 141 MMOL/L — SIGNIFICANT CHANGE UP (ref 135–145)
WBC # BLD: 7.42 K/UL — SIGNIFICANT CHANGE UP (ref 3.8–10.5)
WBC # FLD AUTO: 7.42 K/UL — SIGNIFICANT CHANGE UP (ref 3.8–10.5)

## 2024-03-01 PROCEDURE — 85610 PROTHROMBIN TIME: CPT

## 2024-03-01 PROCEDURE — 96374 THER/PROPH/DIAG INJ IV PUSH: CPT

## 2024-03-01 PROCEDURE — 97162 PT EVAL MOD COMPLEX 30 MIN: CPT

## 2024-03-01 PROCEDURE — 71045 X-RAY EXAM CHEST 1 VIEW: CPT

## 2024-03-01 PROCEDURE — 99285 EMERGENCY DEPT VISIT HI MDM: CPT | Mod: 25

## 2024-03-01 PROCEDURE — 80048 BASIC METABOLIC PNL TOTAL CA: CPT

## 2024-03-01 PROCEDURE — 86850 RBC ANTIBODY SCREEN: CPT

## 2024-03-01 PROCEDURE — 99239 HOSP IP/OBS DSCHRG MGMT >30: CPT

## 2024-03-01 PROCEDURE — 88305 TISSUE EXAM BY PATHOLOGIST: CPT

## 2024-03-01 PROCEDURE — 85027 COMPLETE CBC AUTOMATED: CPT

## 2024-03-01 PROCEDURE — 93306 TTE W/DOPPLER COMPLETE: CPT

## 2024-03-01 PROCEDURE — 86901 BLOOD TYPING SEROLOGIC RH(D): CPT

## 2024-03-01 PROCEDURE — 88311 DECALCIFY TISSUE: CPT

## 2024-03-01 PROCEDURE — 80053 COMPREHEN METABOLIC PANEL: CPT

## 2024-03-01 PROCEDURE — 72192 CT PELVIS W/O DYE: CPT | Mod: MC

## 2024-03-01 PROCEDURE — 93010 ELECTROCARDIOGRAM REPORT: CPT

## 2024-03-01 PROCEDURE — 86900 BLOOD TYPING SEROLOGIC ABO: CPT

## 2024-03-01 PROCEDURE — 82962 GLUCOSE BLOOD TEST: CPT

## 2024-03-01 PROCEDURE — C1713: CPT

## 2024-03-01 PROCEDURE — 93005 ELECTROCARDIOGRAM TRACING: CPT

## 2024-03-01 PROCEDURE — 97530 THERAPEUTIC ACTIVITIES: CPT

## 2024-03-01 PROCEDURE — 73502 X-RAY EXAM HIP UNI 2-3 VIEWS: CPT

## 2024-03-01 PROCEDURE — C1776: CPT

## 2024-03-01 PROCEDURE — 84484 ASSAY OF TROPONIN QUANT: CPT

## 2024-03-01 PROCEDURE — 97166 OT EVAL MOD COMPLEX 45 MIN: CPT

## 2024-03-01 PROCEDURE — 73501 X-RAY EXAM HIP UNI 1 VIEW: CPT

## 2024-03-01 PROCEDURE — 85025 COMPLETE CBC W/AUTO DIFF WBC: CPT

## 2024-03-01 PROCEDURE — 36415 COLL VENOUS BLD VENIPUNCTURE: CPT

## 2024-03-01 PROCEDURE — C9399: CPT

## 2024-03-01 PROCEDURE — 99223 1ST HOSP IP/OBS HIGH 75: CPT

## 2024-03-01 PROCEDURE — 72170 X-RAY EXAM OF PELVIS: CPT

## 2024-03-01 PROCEDURE — 99222 1ST HOSP IP/OBS MODERATE 55: CPT | Mod: GC

## 2024-03-01 RX ORDER — ACETAMINOPHEN 500 MG
975 TABLET ORAL EVERY 8 HOURS
Refills: 0 | Status: COMPLETED | OUTPATIENT
Start: 2024-03-01 | End: 2024-03-04

## 2024-03-01 RX ORDER — SENNA PLUS 8.6 MG/1
2 TABLET ORAL
Qty: 0 | Refills: 0 | DISCHARGE
Start: 2024-03-01

## 2024-03-01 RX ORDER — TRAMADOL HYDROCHLORIDE 50 MG/1
25 TABLET ORAL THREE TIMES A DAY
Refills: 0 | Status: DISCONTINUED | OUTPATIENT
Start: 2024-03-01 | End: 2024-03-04

## 2024-03-01 RX ORDER — ENOXAPARIN SODIUM 100 MG/ML
40 INJECTION SUBCUTANEOUS
Qty: 42 | Refills: 0
Start: 2024-03-01 | End: 2024-04-11

## 2024-03-01 RX ORDER — SENNA PLUS 8.6 MG/1
2 TABLET ORAL AT BEDTIME
Refills: 0 | Status: DISCONTINUED | OUTPATIENT
Start: 2024-03-01 | End: 2024-03-15

## 2024-03-01 RX ORDER — LIDOCAINE 4 G/100G
1 CREAM TOPICAL DAILY
Refills: 0 | Status: DISCONTINUED | OUTPATIENT
Start: 2024-03-01 | End: 2024-03-15

## 2024-03-01 RX ORDER — ENOXAPARIN SODIUM 100 MG/ML
40 INJECTION SUBCUTANEOUS EVERY 24 HOURS
Refills: 0 | Status: DISCONTINUED | OUTPATIENT
Start: 2024-03-02 | End: 2024-03-15

## 2024-03-01 RX ORDER — POLYETHYLENE GLYCOL 3350 17 G/17G
17 POWDER, FOR SOLUTION ORAL DAILY
Refills: 0 | Status: DISCONTINUED | OUTPATIENT
Start: 2024-03-02 | End: 2024-03-04

## 2024-03-01 RX ORDER — OXYCODONE HYDROCHLORIDE 5 MG/1
1 TABLET ORAL
Qty: 0 | Refills: 0 | DISCHARGE
Start: 2024-03-01

## 2024-03-01 RX ORDER — ACETAMINOPHEN 500 MG
2 TABLET ORAL
Qty: 0 | Refills: 0 | DISCHARGE
Start: 2024-03-01

## 2024-03-01 RX ORDER — POLYETHYLENE GLYCOL 3350 17 G/17G
17 POWDER, FOR SOLUTION ORAL
Qty: 0 | Refills: 0 | DISCHARGE
Start: 2024-03-01

## 2024-03-01 RX ORDER — CARBIDOPA AND LEVODOPA 25; 100 MG/1; MG/1
1 TABLET ORAL
Refills: 0 | Status: DISCONTINUED | OUTPATIENT
Start: 2024-03-01 | End: 2024-03-04

## 2024-03-01 RX ADMIN — Medication 975 MILLIGRAM(S): at 21:53

## 2024-03-01 RX ADMIN — CARBIDOPA AND LEVODOPA 1 TABLET(S): 25; 100 TABLET ORAL at 13:05

## 2024-03-01 RX ADMIN — CARBIDOPA AND LEVODOPA 1 TABLET(S): 25; 100 TABLET ORAL at 21:53

## 2024-03-01 RX ADMIN — ENOXAPARIN SODIUM 40 MILLIGRAM(S): 100 INJECTION SUBCUTANEOUS at 13:07

## 2024-03-01 RX ADMIN — Medication 650 MILLIGRAM(S): at 13:05

## 2024-03-01 RX ADMIN — CARBIDOPA AND LEVODOPA 1 TABLET(S): 25; 100 TABLET ORAL at 05:34

## 2024-03-01 RX ADMIN — Medication 650 MILLIGRAM(S): at 05:34

## 2024-03-01 RX ADMIN — Medication 650 MILLIGRAM(S): at 06:34

## 2024-03-01 NOTE — DISCHARGE NOTE NURSING/CASE MANAGEMENT/SOCIAL WORK - NSDCPEFALRISK_GEN_ALL_CORE
For information on Fall & Injury Prevention, visit: [email or web address] OR  [email or web address] OR  https://www.cdc.gov/steadi/patient.html

## 2024-03-01 NOTE — PROGRESS NOTE ADULT - SUBJECTIVE AND OBJECTIVE BOX
78 yom who presents with a chief complaint of fall, left hip pain S/P Left THR POD #3, this morning pt comfortable on bed, admits to pain that is managed. denies chest pain, sob,n,v    PAST MEDICAL & SURGICAL HISTORY:  Parkinsons disease      Cancer of prostate      Radiation proctitis      H/O colectomy      MEDICATIONS  (STANDING):  acetaminophen     Tablet .. 650 milliGRAM(s) Oral every 8 hours  carbidopa/levodopa  25/100 1 Tablet(s) Oral three times a day  enoxaparin Injectable 40 milliGRAM(s) SubCutaneous every 24 hours  senna 2 Tablet(s) Oral at bedtime    MEDICATIONS  (PRN):  aluminum hydroxide/magnesium hydroxide/simethicone Suspension 30 milliLiter(s) Oral every 4 hours PRN Dyspepsia  ondansetron Injectable 4 milliGRAM(s) IV Push every 8 hours PRN Nausea and/or Vomiting  oxyCODONE    IR 5 milliGRAM(s) Oral every 6 hours PRN Severe Pain (7 - 10)  oxyCODONE    IR 2.5 milliGRAM(s) Oral every 6 hours PRN Moderate Pain (4 - 6)  polyethylene glycol 3350 17 Gram(s) Oral daily PRN Constipation    PE:   Vital Signs Last 24 Hrs  T(C): 37.1 (01 Mar 2024 06:13), Max: 37.2 (29 Feb 2024 14:56)  T(F): 98.8 (01 Mar 2024 06:13), Max: 98.9 (29 Feb 2024 14:56)  HR: 76 (01 Mar 2024 06:13) (75 - 98)  BP: 146/74 (01 Mar 2024 06:13) (123/72 - 150/68)  BP(mean): --  RR: 15 (01 Mar 2024 06:13) (15 - 16)  SpO2: 97% (01 Mar 2024 06:13) (95% - 98%)    Parameters below as of 01 Mar 2024 06:13  Patient On (Oxygen Delivery Method): room air  Gen: Awake NAD  Lungs: Clear to auscultation bilaterally  Abdomen: Soft, Nontender, Nondistended; colostomy in place - small trace of stool seen,   : voiding   LE:  No calf tenderness, No pitting edema right leg  Left hip dressing clean and intact , thigh mild swelling ,no ecchymosis or erythema noted, abduction pillow intact, Palpable PT pulse  sensate intact      CBC Full  -  ( 01 Mar 2024 05:46 )  WBC Count : 7.42 K/uL  RBC Count : 4.13 M/uL  Hemoglobin : 11.7 g/dL  Hematocrit : 36.9 %  Platelet Count - Automated : 191 K/uL  Mean Cell Volume : 89.3 fl  Mean Cell Hemoglobin : 28.3 pg  Mean Cell Hemoglobin Concentration : 31.7 gm/dL  Auto Neutrophil # : x  Auto Lymphocyte # : x  Auto Monocyte # : x  Auto Eosinophil # : x  Auto Basophil # : x  Auto Neutrophil % : x  Auto Lymphocyte % : x  Auto Monocyte % : x  Auto Eosinophil % : x  Auto Basophil % : x      03-01    141  |  104  |  22  ----------------------------<  103<H>  4.2   |  30  |  0.90    Ca    9.1      01 Mar 2024 05:46

## 2024-03-01 NOTE — PATIENT PROFILE ADULT - FALL HARM RISK - HARM RISK INTERVENTIONS
Assistance with ambulation/Assistance OOB with selected safe patient handling equipment/Communicate Risk of Fall with Harm to all staff/Discuss with provider need for PT consult/Monitor gait and stability/Reinforce activity limits and safety measures with patient and family/Sit up slowly, dangle for a short time, stand at bedside before walking/Tailored Fall Risk Interventions/Use of alarms - bed, chair and/or voice tab/Visual Cue: Yellow wristband and red socks/Bed in lowest position, wheels locked, appropriate side rails in place/Call bell, personal items and telephone in reach/Instruct patient to call for assistance before getting out of bed or chair/Non-slip footwear when patient is out of bed/[place] to call system/Physically safe environment - no spills, clutter or unnecessary equipment/Purposeful Proactive Rounding/Room/bathroom lighting operational, light cord in reach

## 2024-03-01 NOTE — H&P ADULT - NSHPPHYSICALEXAM_GEN_ALL_CORE
PHYSICAL EXAM  VITALS  T(C): 37.2 (03-01-24 @ 13:02), Max: 37.2 (03-01-24 @ 13:02)  HR: 77 (03-01-24 @ 15:37) (76 - 77)  BP: 133/63 (03-01-24 @ 15:37) (114/61 - 150/68)  RR: 15 (03-01-24 @ 13:02) (15 - 15)  SpO2: 97% (03-01-24 @ 13:02) (95% - 97%)    Gen - NAD, Comfortable  HEENT - NCAT, EOMI, MMM  Neck - Supple, No limited ROM  Pulm - CTAB, No wheeze, No rhonchi, No crackles  Cardiovascular - RRR, S1S2  Chest - good chest expansion, good respiratory effort  Abdomen - Soft, NT/ND, +BS  Extremities - No Cyanosis, no clubbing, edema to left hip, no calf tenderness  Neuro-     Cognitive - awake, alert, oriented to person, sates he is at [facility], knew the month but did not know the year.  Able  to follow command     Communication - Fluent, Comprehensible     Attention: Intact     Memory: unable to recall 3  minutes later     Cranial Nerves - CN 2-12 intact.      Motor -                    LEFT    UE - 4+/5                    RIGHT UE - 4+/5                    LEFT    LE - HF- not tested, KE 3/5, DF 4/5, PF 4/5                    RIGHT LE - 4+/5        Sensory - Intact  to LT  bilaterally     Reflexes - DTR Intact     Tone - normal  Psychiatric - Mood stable  Skin: Left hip incision with aqaucel dressing PHYSICAL EXAM  VITALS  T(C): 37.2 (03-01-24 @ 13:02), Max: 37.2 (03-01-24 @ 13:02)  HR: 77 (03-01-24 @ 15:37) (76 - 77)  BP: 133/63 (03-01-24 @ 15:37) (114/61 - 150/68)  RR: 15 (03-01-24 @ 13:02) (15 - 15)  SpO2: 97% (03-01-24 @ 13:02) (95% - 97%)    Gen - NAD, Comfortable  HEENT - NCAT, EOMI, MMM  Neck - Supple, No limited ROM  Pulm - CTAB, No wheeze, No rhonchi, No crackles  Cardiovascular - RRR, S1S2  Chest - good chest expansion, good respiratory effort  Abdomen - Soft, NT/ND, +BS  Extremities - No Cyanosis, no clubbing, edema to left hip, no calf tenderness  Neuro-     Cognitive - awake, alert, oriented to person oriented to month and year, oriented to [place] but not this region specifically, Able to follow simple commands     Communication - Fluent, Comprehensible     Attention: Intact     Memory: immediate recall intact     Cranial Nerves - CN 2-12 intact.      Motor -                    LEFT    UE - 4+/5                    RIGHT UE - 4+/5                    LEFT    LE - HF- not tested, KE 3/5, DF 4/5, PF 4/5                    RIGHT LE - 4+/5        Sensory - Intact  to LT  bilaterally     Reflexes - DTR Intact     Tone - normal  Psychiatric - Mood stable  Skin: Left hip incision with aqaucel dressing

## 2024-03-01 NOTE — PROGRESS NOTE ADULT - PROVIDER SPECIALTY LIST ADULT
Critical Care
Hospitalist
Orthopedics
Cardiology
Critical Care
Hospitalist
Hospitalist
Orthopedics
Surgery

## 2024-03-01 NOTE — DISCHARGE NOTE NURSING/CASE MANAGEMENT/SOCIAL WORK - PATIENT PORTAL LINK FT
You can access the FollowMyHealth Patient Portal offered by [facility] by registering at the following website: [email or web address]. By joining [employer]’s FollowMyHealth portal, you will also be able to view your health information using other applications (apps) compatible with our system.

## 2024-03-01 NOTE — H&P ADULT - NSHPLABSRESULTS_GEN_ALL_CORE
LABS:                          11.7   7.42  )-----------( 191      ( 01 Mar 2024 05:46 )             36.9     03-01    141  |  104  |  22  ----------------------------<  103<H>  4.2   |  30  |  0.90    Ca    9.1      01 Mar 2024 05:46      < from: Xray Pelvis AP only (02.27.24 @ 13:21) >      IMPRESSION: Subcapital fracture of the left hip replacement with a hip   replacement.    Mild left perihilar infiltrate at this time.    < end of copied text >    < from: Xray Hip 2-3 Views, Left (02.27.24 @ 13:22) >    IMPRESSION: Subcapital fracture of the left hip replacement with a hip   replacement.    Mild left perihilar infiltrate at this time.    < end of copied text >    < from: CT Pelvis Bony Only No Cont (02.27.24 @ 14:59) >    IMPRESSION:  Comminuted and impacted left femoral neck fracture.    < end of copied text > LABS:                           11.7   7.42  )-----------( 191      ( 01 Mar 2024 05:46 )               36.9     03-01    141  |  104  |  22  ----------------------------<  103<H>  4.2   |  30  |  0.90    Ca    9.1      01 Mar 2024 05:46       Xray Pelvis AP only (02.27.24 @ 13:21)       IMPRESSION: Subcapital fracture of the left hip replacement with a hip   replacement.    Mild left perihilar infiltrate at this time.    < end of copied text >    < from: Xray Hip 2-3 Views, Left (02.27.24 @ 13:22) >    IMPRESSION: Subcapital fracture of the left hip replacement with a hip   replacement.    Mild left perihilar infiltrate at this time.    < end of copied text >    < from: CT Pelvis Bony Only No Cont (02.27.24 @ 14:59) >    IMPRESSION:  Comminuted and impacted left femoral neck fracture.    < end of copied text >

## 2024-03-01 NOTE — H&P ADULT - NSHPSOCIALHISTORY_GEN_ALL_CORE
SOCIAL HISTORY  Smoking - Denied  EtOH - Denied   Drugs - Denied    FUNCTIONAL HISTORY  pt lives in condo w/spouse, elevator to apt, no stairs. pt independent w/ambulation in home, uses cane in community.  Prior Level of Function: Mostly independent with BADLs however, intermittently required assistance from spouse who is home    CURRENT FUNCTIONAL STATUS: mod a for transfers / max for ambulation

## 2024-03-01 NOTE — PROGRESS NOTE ADULT - ASSESSMENT
78yo male w. [facility] Parkinsons dz (on carbidopa/levodopa 25/100 TID, f/b Movement Neurologist Dr. Ortega), prior colon CA with ostomy, prior prostate CA s/p radiation cystitis, blurry vision 2/2 cataracts baseline, Dysarthria (admitted May '23- CVA/Sz r/o'd) who presents to ED s/p mechanical fall at home. per wife at the bedside pt ambulates without devise in the house, and has had multiple falls due to poor balance from Parkinsons but usually falls on his buttocks, today he fell on his left side.     *Left hip fracture s/p fall   POD#3  - s/p left hip arthoplasty with Dr Diaz on 2/27   - Cardiology consulted cleared for procedure-- pt cardio stable - signed off   - Completed post-op Ancef  - Pain management PRN   - DVT ppx discussed with Ortho, changed ASA to Lovenox due to decreased mobilization   - PT/OT/PMR recs appreciated rec for Acute Rehab   - Surgery recs appreciated     #Parkinson's disease   - Continue home sinemet  - Acute rehab dispo    #Constipation  - Bowel regimen     #DVT ppx  - Lovenox    Dispo: Cleared from medical and surgical perspective for d/c to Acute Rehab.     3.1 Family updated at bedside

## 2024-03-01 NOTE — H&P ADULT - ASSESSMENT
ASSESSMENT/PLAN  77 year old male of Parkinsons disease since 2022 and follows with Dr. Ortega, prior colon CA w/ ostomy, prior prostate CA s/p radiation cystitis, blurry vision 2/2 cataracts baseline, who presented to ED at [facility] on 2/27 s/p mechanical fall at home. Per wife patient ambulated without devise in the house, and has had multiple falls due to poor balance from Parkinsons, but usually falls on his buttocks. He was found to have a left hip fracture and underwent Left hip replacement with Dr. Diaz.. Post operatively he had orthostatic hypotension which self resolved. His hospital course was uneventful and he was evaluated by PT and OT who recommended acute rehab. He was admitted to [facility] on 3/1/24.       #Left hip fracture s/p hip replacement now with gait Instability, ADL impairments and Functional impairments  - Start Comprehensive Rehab Program of PT/OT/SLP  -Continue lovenox for DVT prophylaxis and then switch to aspirin 81mg BID for total of 6 weeks  -Continue total hip precautions with abduction pillow in bed   -Continue weight bearing as tolerated    ------------------------------------------------------  #Parkinson's related motor symptoms bradykinesia, falls, rigidity, tremors  -Continue Sinemet 25/100mg 1 tab at 8am, 2pm, and 10pm  -Movement disorders following    --------------------------------------------------------  Parkinson's related non-motor symptoms    #Orthostatic hypotension  -Monitor OH vital signs  -Has had periods of OH - consider midodrine     #Pain control  - Tylenol PRN  -Tramadol PRN  -Lidocaine patch    #GI/Bowel Management/Constipation  - Continue Senna at bedtime   - Miralax PRN    #Bladder management  - Continue to monitor PVR q 8 hours (SC if > 400)  -Monitor UO    ----------------------------------------------------------  Concurrent medical problems    #DVT Prophylaxis  - Lovenox  - TEDs     #Skin:  -***    FEN   - Diet - Regular with thins  - Dysphagia  SLP - evaluation and treatment    Precautions / PROPHYLAXIS:   - Falls  - ortho: Weight bearing status: WBAT with hip precautions   - Lungs: Incentive Spirometer   - Pressure injury/Skin: OOB to Chair, PT/OT        Sonido Alvarez  [address] [address]  Phone: [phone]  Fax: [phone]  Follow Up Time:     Adonis Diaz  Orthopaedic Surgery  [address] [place] [address] [address]  [place] [address]  Phone: [phone]  Fax: [phone]      MEDICAL PROGNOSIS: GOOD              REHAB POTENTIAL: GOOD              ESTIMATED DISPOSITION: HOME WITH HOME CARE              ELOS: 10-14 Days   EXPECTED THERAPY:     P.T. 1hr/day       O.T. 1hr/day      S.L.P. 1hr/day       P&O Unnecessary       EXP FREQUENCY: 5 days per 7 day period     PRESCREEN COMPARISON:   I have reviewed the prescreen information and I have found no relevant changes between the preadmission screening and my post admission evaluation     RATIONALE FOR INPATIENT ADMISSION - Patient demonstrates the following: (check all that apply)  [X] Medically appropriate for rehabilitation admission  [X] Has attainable rehab goals with an appropriate initial discharge plan  [X] Has rehabilitation potential (expected to make a significant improvement within a reasonable period of time)   [X] Requires close medical management by a rehab physician, rehab nursing care, Hospitalist and comprehensive interdisciplinary team (including PT, OT, & or SLP, Prosthetics and Orthotics)   ASSESSMENT/PLAN  77 year old male of Parkinson's disease since 2022 and follows with Dr. Ortega, prior colon CA w/ ostomy, prior prostate CA s/p radiation cystitis, blurry vision 2/2 cataracts baseline, who presented to ED at [facility] on 2/27 s/p mechanical fall at home. Per wife patient ambulated without devise in the house, and has had multiple falls due to poor balance from Parkinson's but usually falls on his buttocks. He was found to have a left hip fracture and underwent Left hip replacement with Dr. Diaz. Post operatively he had orthostatic hypotension which self resolved. His hospital course was uneventful and he was evaluated by PT and OT who recommended acute rehab. He was admitted to [facility] on 3/1/24.       #Left hip fracture s/p hip replacement now with gait Instability, ADL impairments and Functional impairments  - Start Comprehensive Rehab Program of PT/OT/SLP  -Continue Lovenox for DVT prophylaxis and then switch to aspirin 81mg BID for total of 6 weeks  -Continue total hip precautions with abduction pillow in bed   -Continue weight bearing as tolerated    ------------------------------------------------------  #Parkinson's related motor symptoms bradykinesia, falls, rigidity, tremors  -Continue Sinemet 25/100mg 1 tab at 8am, 2pm, and 10pm  -Movement disorders following    --------------------------------------------------------  Parkinson's related non-motor symptoms    #Orthostatic hypotension  -Monitor OH vital signs  -Has had periods of OH - consider midodrine     #Pain control  - Tylenol PRN  - Tramadol PRN  - Lidocaine patch    #GI/Bowel Management/Constipation  - Continue Senna at bedtime   - Miralax PRN    #Bladder management  - Continue to monitor PVR q 8 hours (SC if > 400)  -Monitor UO    ----------------------------------------------------------  Concurrent medical problems    #DVT Prophylaxis  - Lovenox  - TEDs     #Skin:  - Left hip incision with aquacel dressing    FEN   - Diet - Regular with thins  - Dysphagia  SLP - evaluation and treatment    Precautions / PROPHYLAXIS:   - Falls  - ortho: Weight bearing status: WBAT with hip precautions   - Lungs: Incentive Spirometer   - Pressure injury/Skin: OOB to Chair, PT/OT        Sonido Alvarez  [address] [address]  Phone: [phone]  Fax: [phone]  Follow Up Time:     Adonis Diaz  Orthopaedic Surgery  [address] [place] [address] [address]  [place] [address]  Phone: [phone]  Fax: [phone]      MEDICAL PROGNOSIS: GOOD              REHAB POTENTIAL: GOOD              ESTIMATED DISPOSITION: HOME WITH HOME CARE              ELOS: 10-14 Days   EXPECTED THERAPY:     P.T. 1hr/day       O.T. 1hr/day      S.L.P. 1hr/day       P&O Unnecessary       EXP FREQUENCY: 5 days per 7 day period     PRESCREEN COMPARISON:   I have reviewed the prescreen information and I have found no relevant changes between the preadmission screening and my post admission evaluation     RATIONALE FOR INPATIENT ADMISSION - Patient demonstrates the following: (check all that apply)  [X] Medically appropriate for rehabilitation admission  [X] Has attainable rehab goals with an appropriate initial discharge plan  [X] Has rehabilitation potential (expected to make a significant improvement within a reasonable period of time)   [X] Requires close medical management by a rehab physician, rehab nursing care, Hospitalist and comprehensive interdisciplinary team (including PT, OT, & or SLP, Prosthetics and Orthotics)

## 2024-03-01 NOTE — H&P ADULT - HISTORY OF PRESENT ILLNESS
77 year old male of Parkinsons disease since 2022 and follows with Dr. Ortega, prior colon CA w/ ostomy, prior prostate CA s/p radiation cystitis, blurry vision 2/2 cataracts baseline, who presented to ED at [facility] on 2/27 s/p mechanical fall at home. Per wife patient ambulated without devise in the house, and has had multiple falls due to poor balance from Parkinsons, but usually falls on his buttocks. He was found to have a left hip fracture and underwent Left hip replacement with Dr. Diaz. Post operatively he had orthostatic hypotension which self resolved. His hospital course was uneventful and he was evaluated by PT and OT who recommended acute rehab. He was admitted to [facility] on 3/1/24.

## 2024-03-01 NOTE — CONSULT NOTE ADULT - ASSESSMENT
78 yo male with PD, hip fracture s/p JENNIFER   1. PT- bed mobility,transfers, gait and balance training  2. OT- ADL'S  3.pain- tylenol. lidocaine patch   4. PD- may need medication adjustment   5. Patient would benefit from acute rehab, needs a multidisciplinary team including PT, OT and speech. Can tolerate 3 hours of therapy a day.  Will follow.

## 2024-03-01 NOTE — PROGRESS NOTE ADULT - PROBLEM SELECTOR PLAN 1
PT /OT WBAT left leg   Lovenox for DVT prophylaxis while in patient to convert to asa 81 mg bid when d/c home  total hip precautions  Rehab as per medicine PT /OT WBAT left leg   Lovenox for DVT prophylaxis while in patient to convert to asa 81 mg bid when d/c home for total of 6 weeks  total hip precautions  Rehab as per medicine  Discussed with Orthopedic surgeon

## 2024-03-01 NOTE — CONSULT NOTE ADULT - SUBJECTIVE AND OBJECTIVE BOX
HPI:  77M PMHx of PD (on carbidopa/levodopa 25/100 TID, f/b Movement Neurologist Dr. Ortega), prior colon CA w/ ostomy, prior prostate CA s/p radiation cystitis, blurry vision 2/2 cataracts baseline, who presents to ED s/p mechanical fall at home. per wife at the bedside pt ambulates without devise in the house, and has had multiple falls due to poor balance from Parkinsons, but usually falls on his buttocks, today he fell on his left side.   he was admitted may/2023 for dysarthia ans cva and sz was ruled out.     s/p JENNIFER POD3      seen by ortho, plan for surgery this evening pending clearance   no prior echo to compare- ordered echo now, cardio consulted for clearence    (27 Feb 2024 14:47)      REVIEW OF SYSTEMS: No chest pain, shortness of breath, nausea, vomiting or diarhea.      PAST MEDICAL & SURGICAL HISTORY  No pertinent past medical history    Parkinsons disease    Cancer of prostate    Radiation proctitis    H/O colectomy        SOCIAL HISTORY    Following information obtained from pt and pt's wife. [facility], elevator to apt, no stairs. pt independent w/ambulation in home, uses cane in community. pt has rw, raised toilet seat, grab bars, shower chair and w/c. Spouse reports pt was mostly independent with BADLs however, intermittently required assistance from spouse who is home with pt. Pt reports walk in shower +grab bars    FUNCTIONAL HISTORY:   Mod a     CURRENT FUNCTIONAL STATUS:      FAMILY HISTORY   No pertinent family history in first degree relatives        RECENT LABS/IMAGING  CBC Full  -  ( 01 Mar 2024 05:46 )  WBC Count : 7.42 K/uL  RBC Count : 4.13 M/uL  Hemoglobin : 11.7 g/dL  Hematocrit : 36.9 %  Platelet Count - Automated : 191 K/uL  Mean Cell Volume : 89.3 fl  Mean Cell Hemoglobin : 28.3 pg  Mean Cell Hemoglobin Concentration : 31.7 gm/dL  Auto Neutrophil # : x  Auto Lymphocyte # : x  Auto Monocyte # : x  Auto Eosinophil # : x  Auto Basophil # : x  Auto Neutrophil % : x  Auto Lymphocyte % : x  Auto Monocyte % : x  Auto Eosinophil % : x  Auto Basophil % : x    03-01    141  |  104  |  22  ----------------------------<  103<H>  4.2   |  30  |  0.90    Ca    9.1      01 Mar 2024 05:46      Urinalysis Basic - ( 01 Mar 2024 05:46 )    Color: x / Appearance: x / SG: x / pH: x  Gluc: 103 mg/dL / Ketone: x  / Bili: x / Urobili: x   Blood: x / Protein: x / Nitrite: x   Leuk Esterase: x / RBC: x / WBC x   Sq Epi: x / Non Sq Epi: x / Bacteria: x        VITALS  T(C): 37.1 (03-01-24 @ 06:13), Max: 37.2 (02-29-24 @ 14:56)  HR: 76 (03-01-24 @ 06:13) (75 - 98)  BP: 146/74 (03-01-24 @ 06:13) (123/72 - 150/68)  RR: 15 (03-01-24 @ 06:13) (15 - 16)  SpO2: 97% (03-01-24 @ 06:13) (95% - 98%)  Wt(kg): --    ALLERGIES  No Known Allergies      MEDICATIONS   acetaminophen     Tablet .. 650 milliGRAM(s) Oral every 8 hours  aluminum hydroxide/magnesium hydroxide/simethicone Suspension 30 milliLiter(s) Oral every 4 hours PRN  carbidopa/levodopa  25/100 1 Tablet(s) Oral three times a day  enoxaparin Injectable 40 milliGRAM(s) SubCutaneous every 24 hours  ondansetron Injectable 4 milliGRAM(s) IV Push every 8 hours PRN  oxyCODONE    IR 2.5 milliGRAM(s) Oral every 6 hours PRN  oxyCODONE    IR 5 milliGRAM(s) Oral every 6 hours PRN  polyethylene glycol 3350 17 Gram(s) Oral daily PRN  senna 2 Tablet(s) Oral at bedtime      ----------------------------------------------------------------------------------------  PHYSICAL EXAM  Constitutional - NAD, Comfortable  HEENT - NCAT, EOMI  Neck - Supple, No limited ROM  Chest - CTA bilaterally, No wheeze, No rhonchi, No crackles  Cardiovascular - RRR, S1S2, No murmurs  Abdomen - BS+, Soft, NTND  Extremities - No C/C/E, No calf tenderness   Neurologic Exam -                    Cognitive - Awake, Alert, AAO to self, place, date, year, situation     Communication - Fluent, No dysarthria, no aphasia     Cranial Nerves - CN 2-12 intact     Motor - No focal deficits                       Sensory - Intact to LT     Reflexes - DTR Intact, No primitive reflexive     Balance - WNL Static  Psychiatric - Mood stable, Affect WNL         HPI:  77M PMHx of PD (on carbidopa/levodopa 25/100 TID, f/b Movement Neurologist Dr. Ortega), prior colon CA w/ ostomy, prior prostate CA s/p radiation cystitis, blurry vision 2/2 cataracts baseline, who presents to ED s/p mechanical fall at home. per wife at the bedside pt ambulates without devise in the house, and has had multiple falls due to poor balance from Parkinsons, but usually falls on his buttocks, today he fell on his left side.   he was admitted may/2023 for dysarthia ans cva and sz was ruled out.     s/p JENNIFER POD3  PAin controlled with Tylenol \  + bm today   condom cath on       REVIEW OF SYSTEMS: No chest pain, shortness of breath, nausea, vomiting or diarhea.      PAST MEDICAL & SURGICAL HISTORY  No pertinent past medical history    Parkinsons disease    Cancer of prostate    Radiation proctitis    H/O colectomy        SOCIAL HISTORY    Following information obtained from pt and pt's wife. [facility], elevator to apt, no stairs. pt independent w/ambulation in home, uses cane in community. pt has rw, raised toilet seat, grab bars, shower chair and w/c. Spouse reports pt was mostly independent with BADLs however, intermittently required assistance from spouse who is home with pt. Pt reports walk in shower +grab bars        CURRENT FUNCTIONAL STATUS: mod a for transfers / max for ambulation       FAMILY HISTORY   No pertinent family history in first degree relatives        RECENT LABS/IMAGING  CBC Full  -  ( 01 Mar 2024 05:46 )  WBC Count : 7.42 K/uL  RBC Count : 4.13 M/uL  Hemoglobin : 11.7 g/dL  Hematocrit : 36.9 %  Platelet Count - Automated : 191 K/uL  Mean Cell Volume : 89.3 fl  Mean Cell Hemoglobin : 28.3 pg  Mean Cell Hemoglobin Concentration : 31.7 gm/dL  Auto Neutrophil # : x  Auto Lymphocyte # : x  Auto Monocyte # : x  Auto Eosinophil # : x  Auto Basophil # : x  Auto Neutrophil % : x  Auto Lymphocyte % : x  Auto Monocyte % : x  Auto Eosinophil % : x  Auto Basophil % : x    03-01    141  |  104  |  22  ----------------------------<  103<H>  4.2   |  30  |  0.90    Ca    9.1      01 Mar 2024 05:46      Urinalysis Basic - ( 01 Mar 2024 05:46 )    Color: x / Appearance: x / SG: x / pH: x  Gluc: 103 mg/dL / Ketone: x  / Bili: x / Urobili: x   Blood: x / Protein: x / Nitrite: x   Leuk Esterase: x / RBC: x / WBC x   Sq Epi: x / Non Sq Epi: x / Bacteria: x        VITALS  T(C): 37.1 (03-01-24 @ 06:13), Max: 37.2 (02-29-24 @ 14:56)  HR: 76 (03-01-24 @ 06:13) (75 - 98)  BP: 146/74 (03-01-24 @ 06:13) (123/72 - 150/68)  RR: 15 (03-01-24 @ 06:13) (15 - 16)  SpO2: 97% (03-01-24 @ 06:13) (95% - 98%)  Wt(kg): --    ALLERGIES  No Known Allergies      MEDICATIONS   acetaminophen     Tablet .. 650 milliGRAM(s) Oral every 8 hours  aluminum hydroxide/magnesium hydroxide/simethicone Suspension 30 milliLiter(s) Oral every 4 hours PRN  carbidopa/levodopa  25/100 1 Tablet(s) Oral three times a day  enoxaparin Injectable 40 milliGRAM(s) SubCutaneous every 24 hours  ondansetron Injectable 4 milliGRAM(s) IV Push every 8 hours PRN  oxyCODONE    IR 2.5 milliGRAM(s) Oral every 6 hours PRN  oxyCODONE    IR 5 milliGRAM(s) Oral every 6 hours PRN  polyethylene glycol 3350 17 Gram(s) Oral daily PRN  senna 2 Tablet(s) Oral at bedtime      ----------------------------------------------------------------------------------------  PHYSICAL EXAM  Constitutional - NAD, Comfortable  HEENT - NCAT, EOMI  Neck - rotated to the right, tight SCM   Chest - CTA bilaterally, No wheeze, No rhonchi, No crackles  Cardiovascular - RRR, S1S2, No murmurs  Abdomen - BS+, Soft, NTND  Extremities - No C/C/E, No calf tenderness   Neurologic Exam -                    Cognitive - Awake, Alert, AAO to self, place, month , not  year, situation     Communication - hypophonic      Cranial Nerves - CN 2-12 intact     Motor - 4/5 thoughout , limited by pain on LE   cogwheel rigidity b/l UE                        Sensory - Intact to LT     Reflexes - DTR Intact, No primitive reflexive     Balance - WNL Static  Psychiatric - masked facies, appropriate

## 2024-03-01 NOTE — PROGRESS NOTE ADULT - NS ATTEND AMEND GEN_ALL_CORE FT
#Left hip fracture   - s/p left hip arthoplasty  - pt tolerated procedure  - cont pain control with tylenol ATC, oxy PRN  - PT/OT    #Parkinson disease  #orthostatic hypotension  - s/p IVF on 2/28  -orthostatics now improved  -MORAIMA stocking  -cont sinemet.    Plan discussed with wife at bedside.  DC to acute rehab. Time spent 50 mins
#Left hip fracture   - s/p left hip arthoplasty  - pt tolerated procedure  - cont pain control  - PT/OT    #Parkinson disease  #Syncope  -pt had a RRT today for syncope, likely due to orthostatic hypotension  -500cc IVF bolus  -gentle IVF  -check orthostatic VS   -MORAIMA stocking  -no abd binder as pt with colostomy  -cont sinemet
#Left hip fracture   - s/p left hip arthoplasty  - pt tolerated procedure  - cont pain control with tylenol ATC, oxy PRN  - PT/OT    #Parkinson disease  #orthostatic hypotension  -orthostatics normal today, s/p IVF on 2/28  -MORAIMA stocking  -cont sinemet.    Plan discussed with son at bedside.  DC to acute rehab hopefully tomorrow

## 2024-03-01 NOTE — PROGRESS NOTE ADULT - REASON FOR ADMISSION
fall, left hip pain

## 2024-03-01 NOTE — H&P ADULT - TIME BILLING
I spent 60 minutes with history, physical exam, discussing care with patient, coordinating care with nursing, therapists, residents, staff members, reviewing/adjusting current and outside hospital records.

## 2024-03-01 NOTE — PROGRESS NOTE ADULT - SUBJECTIVE AND OBJECTIVE BOX
Patient is a 78y old  Male who presents with a chief complaint of fall, left hip pain (01 Mar 2024 10:23)    Patient seen and examined at bedside.  S: Pain controlled. Denies new complaints.     ALLERGIES:  No Known Allergies    MEDICATIONS:  acetaminophen     Tablet .. 650 milliGRAM(s) Oral every 8 hours  aluminum hydroxide/magnesium hydroxide/simethicone Suspension 30 milliLiter(s) Oral every 4 hours PRN  carbidopa/levodopa  25/100 1 Tablet(s) Oral three times a day  enoxaparin Injectable 40 milliGRAM(s) SubCutaneous every 24 hours  ondansetron Injectable 4 milliGRAM(s) IV Push every 8 hours PRN  oxyCODONE    IR 5 milliGRAM(s) Oral every 6 hours PRN  oxyCODONE    IR 2.5 milliGRAM(s) Oral every 6 hours PRN  polyethylene glycol 3350 17 Gram(s) Oral daily PRN  senna 2 Tablet(s) Oral at bedtime    Vital Signs Last 24 Hrs  T(F): 98.8 (01 Mar 2024 06:13), Max: 98.9 (29 Feb 2024 14:56)  HR: 76 (01 Mar 2024 06:13) (76 - 98)  BP: 146/74 (01 Mar 2024 06:13) (137/78 - 150/68)  RR: 15 (01 Mar 2024 06:13) (15 - 16)  SpO2: 97% (01 Mar 2024 06:13) (95% - 98%)  I&O's Summary      PHYSICAL EXAM:  General: NAD, A/O x 3  ENT: MMM  Lungs: Clear to auscultation bilaterally (Anteriorly)   Cardio: RR, S1/S2, No murmurs  Abdomen: Obese, Soft, NT/ND, Normal active Bowel Sounds   Extremities: No cyanosis, No edema. LLE- Hip dsg c/d/i, no strikethrough, leg soft, +DP pulse, warm well perfused. Hip abduction pillow in place.      LABS:                        11.7   7.42  )-----------( 191      ( 01 Mar 2024 05:46 )             36.9     03-01    141  |  104  |  22  ----------------------------<  103  4.2   |  30  |  0.90    Ca    9.1      01 Mar 2024 05:46    TPro  6.6  /  Alb  3.1  /  TBili  0.8  /  DBili  x   /  AST  34  /  ALT  6   /  AlkPhos  51  02-28      PT/INR - ( 27 Feb 2024 12:43 )   PT: 12.8 sec;   INR: 1.10 ratio           CARDIAC MARKERS ( 27 Feb 2024 12:43 )  x     / 24.6 ng/L / x     / x     / x            Urinalysis Basic - ( 01 Mar 2024 05:46 )  Color: x / Appearance: x / SG: x / pH: x  Gluc: 103 mg/dL / Ketone: x  / Bili: x / Urobili: x   Blood: x / Protein: x / Nitrite: x   Leuk Esterase: x / RBC: x / WBC x   Sq Epi: x / Non Sq Epi: x / Bacteria: x          RADIOLOGY & ADDITIONAL TESTS:  < from: Xray Hip w/ Pelvis 1 View, Left (02.27.24 @ 21:45) >  IMPRESSION: Subcapital fracture of the left hip replacement with a hip   replacement.  Mild left perihilar infiltrate at this time.    < from: CT Pelvis Bony Only No Cont (02.27.24 @ 14:59) >  IMPRESSION:  Comminuted and impacted left femoral neck fracture.    Care Discussed with Consultants/Other Providers:   FREDA Cardenas discussed case and plan with Dr. Hanson

## 2024-03-01 NOTE — H&P ADULT - NSHPREVIEWOFSYSTEMS_GEN_ALL_CORE
REVIEW OF SYSTEMS  Constitutional: No fever, No Chills, No fatigue  HEENT: No eye pain, No visual disturbances, No difficulty hearing  Pulm: No cough,  No shortness of breath  Cardio: No chest pain, No palpitations  GI:  No abdominal pain, No nausea, No vomiting, No diarrhea, No constipation  : No dysuria, No frequency, No hematuria  Neuro: No headaches, + memory loss, + loss of strength, + numbness, + tremors  Skin: No itching, No rashes, No lesions   Endo: No temperature intolerance  MSK: + joint pain- left hip, No joint swelling, No muscle pain, No Neck pain,  No back pain  Psych:  No depression, No anxiety

## 2024-03-02 LAB
ALBUMIN SERPL ELPH-MCNC: 2.6 G/DL — LOW (ref 3.3–5)
ALP SERPL-CCNC: 59 U/L — SIGNIFICANT CHANGE UP (ref 40–120)
ALT FLD-CCNC: 24 U/L — SIGNIFICANT CHANGE UP (ref 10–45)
ANION GAP SERPL CALC-SCNC: 7 MMOL/L — SIGNIFICANT CHANGE UP (ref 5–17)
AST SERPL-CCNC: 52 U/L — HIGH (ref 10–40)
BILIRUB SERPL-MCNC: 0.6 MG/DL — SIGNIFICANT CHANGE UP (ref 0.2–1.2)
BUN SERPL-MCNC: 25 MG/DL — HIGH (ref 7–23)
CALCIUM SERPL-MCNC: 9.3 MG/DL — SIGNIFICANT CHANGE UP (ref 8.4–10.5)
CHLORIDE SERPL-SCNC: 106 MMOL/L — SIGNIFICANT CHANGE UP (ref 96–108)
CO2 SERPL-SCNC: 30 MMOL/L — SIGNIFICANT CHANGE UP (ref 22–31)
CREAT SERPL-MCNC: 0.89 MG/DL — SIGNIFICANT CHANGE UP (ref 0.5–1.3)
EGFR: 87 ML/MIN/1.73M2 — SIGNIFICANT CHANGE UP
GLUCOSE SERPL-MCNC: 101 MG/DL — HIGH (ref 70–99)
HCT VFR BLD CALC: 36.5 % — LOW (ref 39–50)
HGB BLD-MCNC: 11.5 G/DL — LOW (ref 13–17)
MCHC RBC-ENTMCNC: 28 PG — SIGNIFICANT CHANGE UP (ref 27–34)
MCHC RBC-ENTMCNC: 31.5 GM/DL — LOW (ref 32–36)
MCV RBC AUTO: 89 FL — SIGNIFICANT CHANGE UP (ref 80–100)
NRBC # BLD: 0 /100 WBCS — SIGNIFICANT CHANGE UP (ref 0–0)
PLATELET # BLD AUTO: 247 K/UL — SIGNIFICANT CHANGE UP (ref 150–400)
POTASSIUM SERPL-MCNC: 4.9 MMOL/L — SIGNIFICANT CHANGE UP (ref 3.5–5.3)
POTASSIUM SERPL-SCNC: 4.9 MMOL/L — SIGNIFICANT CHANGE UP (ref 3.5–5.3)
PROT SERPL-MCNC: 6.8 G/DL — SIGNIFICANT CHANGE UP (ref 6–8.3)
RBC # BLD: 4.1 M/UL — LOW (ref 4.2–5.8)
RBC # FLD: 13.9 % — SIGNIFICANT CHANGE UP (ref 10.3–14.5)
SODIUM SERPL-SCNC: 143 MMOL/L — SIGNIFICANT CHANGE UP (ref 135–145)
WBC # BLD: 7.16 K/UL — SIGNIFICANT CHANGE UP (ref 3.8–10.5)
WBC # FLD AUTO: 7.16 K/UL — SIGNIFICANT CHANGE UP (ref 3.8–10.5)

## 2024-03-02 PROCEDURE — 99223 1ST HOSP IP/OBS HIGH 75: CPT

## 2024-03-02 PROCEDURE — 99232 SBSQ HOSP IP/OBS MODERATE 35: CPT

## 2024-03-02 RX ADMIN — CARBIDOPA AND LEVODOPA 1 TABLET(S): 25; 100 TABLET ORAL at 22:13

## 2024-03-02 RX ADMIN — Medication 975 MILLIGRAM(S): at 07:59

## 2024-03-02 RX ADMIN — CARBIDOPA AND LEVODOPA 1 TABLET(S): 25; 100 TABLET ORAL at 14:19

## 2024-03-02 RX ADMIN — CARBIDOPA AND LEVODOPA 1 TABLET(S): 25; 100 TABLET ORAL at 08:45

## 2024-03-02 RX ADMIN — Medication 975 MILLIGRAM(S): at 06:24

## 2024-03-02 RX ADMIN — ENOXAPARIN SODIUM 40 MILLIGRAM(S): 100 INJECTION SUBCUTANEOUS at 06:25

## 2024-03-02 NOTE — DIETITIAN INITIAL EVALUATION ADULT - PERTINENT LABORATORY DATA
03-02    143  |  106  |  25<H>  ----------------------------<  101<H>  4.9   |  30  |  0.89    Ca    9.3      02 Mar 2024 06:49    TPro  6.8  /  Alb  2.6<L>  /  TBili  0.6  /  DBili  x   /  AST  52<H>  /  ALT  24  /  AlkPhos  59  03-02

## 2024-03-02 NOTE — PROGRESS NOTE ADULT - SUBJECTIVE AND OBJECTIVE BOX
Cc: Gait dysfunction    HPI: Patient with no new medical issues overnight.  Pain controlled, no chest pain, no N/V, no Fevers/Chills.   No other new ROS  Has been tolerating rehabilitation program.    acetaminophen     Tablet .. 975 milliGRAM(s) Oral every 8 hours  aluminum hydroxide/magnesium hydroxide/simethicone Suspension 30 milliLiter(s) Oral every 4 hours PRN  carbidopa/levodopa  25/100 1 Tablet(s) Oral <User Schedule>  enoxaparin Injectable 40 milliGRAM(s) SubCutaneous every 24 hours  lidocaine   4% Patch 1 Patch Transdermal daily  multivitamin 1 Tablet(s) Oral daily  polyethylene glycol 3350 17 Gram(s) Oral daily PRN  senna 2 Tablet(s) Oral at bedtime  traMADol 25 milliGRAM(s) Oral three times a day PRN      T(C): 36.8 (03-02-24 @ 15:20), Max: 36.8 (03-02-24 @ 15:20)  HR: 71 (03-02-24 @ 08:54) (71 - 82)  BP: 103/60 (03-02-24 @ 08:54) (103/60 - 168/73)  RR: 16 (03-02-24 @ 08:54) (16 - 16)  SpO2: 98% (03-02-24 @ 08:54) (94% - 98%)    In NAD  HEENT- EOMI  Heart- RRR, S1S2  Lungs- CTA bl.  Abd- + BS, NT  Ext- No calf pain  Neuro- Exam unchanged  ostomy without bilious bloody mater

## 2024-03-02 NOTE — DIETITIAN INITIAL EVALUATION ADULT - OTHER INFO
77 year old male of Parkinson's disease since 2022 and follows with Dr. Ortega, prior colon CA w/ ostomy, prior prostate CA s/p radiation cystitis, blurry vision 2/2 cataracts baseline, who presented to ED at [facility] on 2/27 s/p mechanical fall at home. Per wife patient ambulated without devise in the house, and has had multiple falls due to poor balance from Parkinson's but usually falls on his buttocks. He was found to have a left hip fracture and underwent Left hip replacement with Dr. Diaz. Post operatively he had orthostatic hypotension which self resolved. His hospital course was uneventful and he was evaluated by PT and OT who recommended acute rehab. He was admitted to [facility] on 3/1/24.   Pt with improving appetite since surgery, wife states that he consumed all of his lunch this afternoon. Good acceptance of Ensure, but has not been consuming TID. Will reduce to BID. -180lbs, .4lbs. Weights stable. +1 edema bilateral ankles. No pressure ulcers. +ostomy, miralax/senna. Pt with hx of Parkinson's, on Sinemet TID at home- protein/levodopa interaction reviewed with pt's wife. Education well received and validated by teach back.

## 2024-03-02 NOTE — DIETITIAN INITIAL EVALUATION ADULT - ORAL INTAKE PTA/DIET HISTORY
Pt asleep at time of visit, interview conducted with pt's wife at bedside. Pt tolerates a regular diet at home with good appetite. Wife prepares all meals + snacks in between. NKFA. Pt eats Halal foods. Pt wife admits that pt needs encouragement to drink more fluids.

## 2024-03-02 NOTE — PROGRESS NOTE ADULT - ASSESSMENT
Imp: Patient with diagnosis of   Left hip replacement       admitted for comprehensive acute rehabilitation.    Plan:  - Continue PT/OT/SLP  - DVT prophylaxis  - Skin- Turn q2h, check skin daily  - Continue current medications; patient medically stable.   -Active issues-   - Patient is stable to continue current rehabilitation program.   - noted orthostatic vital signs 3/2/2024  - counselled patient and relative on expectations while in acute in patient rehab

## 2024-03-02 NOTE — CONSULT NOTE ADULT - SUBJECTIVE AND OBJECTIVE BOX
Patient is a 78y old  Male who presents with a chief complaint of Left hip replacement (01 Mar 2024 13:18)      HPI:  HPI:  77 year old male of Parkinsons disease since 2022 and follows with Dr. Ortega, prior colon CA w/ ostomy, prior prostate CA s/p radiation cystitis, blurry vision 2/2 cataracts baseline, who presented to ED at [facility] on 2/27 s/p mechanical fall at home. Per wife patient ambulated without devise in the house, and has had multiple falls due to poor balance from Parkinsons, but usually falls on his buttocks. He was found to have a left hip fracture and underwent Left hip replacement with Dr. Diaz. Post operatively he had orthostatic hypotension which self resolved. His hospital course was uneventful and he was evaluated by PT and OT who recommended acute rehab. He was admitted to [facility] on 3/1/24.  (01 Mar 2024 13:18)      PAST MEDICAL & SURGICAL HISTORY:  Parkinsons disease      Cancer of prostate      Radiation proctitis      H/O colectomy          Father: - at age - with history of   Mother: - at age - with history of           Substance Use (street drugs): (  ) never used  (  ) other:  Tobacco Usage:  (   ) never smoked   (   ) former smoker   (   ) current smoker  (     ) pack year  Alcohol Usage:  Sexual History:   Recent Travel:      Allergies    No Known Allergies    Intolerances            REVIEW OF SYSTEMS:  CONSTITUTIONAL: No fever, weight loss, or fatigue  EYES: No eye pain, visual disturbances, or discharge  ENMT:  No difficulty hearing, tinnitus, vertigo; No sinus or throat pain  NECK: No pain or stiffness  BREASTS: No pain, masses, or nipple discharge  RESPIRATORY: No cough, wheezing, chills or hemoptysis; No shortness of breath  CARDIOVASCULAR: No chest pain, palpitations, dizziness, or leg swelling  GASTROINTESTINAL: No abdominal or epigastric pain. No nausea, vomiting, or hematemesis; No diarrhea or constipation. No melena or hematochezia.  GENITOURINARY: No dysuria, frequency, hematuria, or incontinence  NEUROLOGICAL: No headaches, memory loss, loss of strength, numbness, or tremors  SKIN: No itching, burning, rashes, or lesions   LYMPH NODES: No enlarged glands  ENDOCRINE: No heat or cold intolerance; No hair loss  MUSCULOSKELETAL: No joint pain or swelling; No muscle, back, or extremity pain  PSYCHIATRIC: No depression, anxiety, mood swings, or difficulty sleeping  HEME/LYMPH: No easy bruising, or bleeding gums  ALLERY AND IMMUNOLOGIC: No hives or eczema    ALL ROS REVIEWED AND NORMAL EXCEPT AS STATED ABOVE    T(C): 36.7 (03-01-24 @ 21:47), Max: 36.7 (03-01-24 @ 21:47)  HR: 71 (03-02-24 @ 08:54) (71 - 82)  BP: 103/60 (03-02-24 @ 08:54) (103/60 - 168/73)  RR: 16 (03-02-24 @ 08:54) (16 - 16)  SpO2: 98% (03-02-24 @ 08:54) (94% - 98%)  Wt(kg): --Vital Signs Last 24 Hrs  T(C): 36.7 (01 Mar 2024 21:47), Max: 36.7 (01 Mar 2024 21:47)  T(F): 98.1 (01 Mar 2024 21:47), Max: 98.1 (01 Mar 2024 21:47)  HR: 71 (02 Mar 2024 08:54) (71 - 82)  BP: 103/60 (02 Mar 2024 08:54) (103/60 - 168/73)  BP(mean): --  RR: 16 (02 Mar 2024 08:54) (16 - 16)  SpO2: 98% (02 Mar 2024 08:54) (94% - 98%)    Parameters below as of 02 Mar 2024 08:54  Patient On (Oxygen Delivery Method): room air        PHYSICAL EXAM:  Gen - NAD, Comfortable  HEENT - NCAT, EOMI, MMM  Neck - Supple, No limited ROM  Pulm - CTAB, No wheeze, No rhonchi, No crackles  Cardiovascular - RRR, S1S2  Chest - good chest expansion, good respiratory effort  Abdomen - Soft, NT/ND, +BS        LABS:                        11.5   7.16  )-----------( 247      ( 02 Mar 2024 06:49 )             36.5     03-02    143  |  106  |  25<H>  ----------------------------<  101<H>  4.9   |  30  |  0.89    Ca    9.3      02 Mar 2024 06:49    TPro  6.8  /  Alb  2.6<L>  /  TBili  0.6  /  DBili  x   /  AST  52<H>  /  ALT  24  /  AlkPhos  59  03-02      Urinalysis Basic - ( 02 Mar 2024 06:49 )    Color: x / Appearance: x / SG: x / pH: x  Gluc: 101 mg/dL / Ketone: x  / Bili: x / Urobili: x   Blood: x / Protein: x / Nitrite: x   Leuk Esterase: x / RBC: x / WBC x   Sq Epi: x / Non Sq Epi: x / Bacteria: x      RADIOLOGY & ADDITIONAL TESTS:    Consultant(s) Notes Reviewed:  [x ] YES  [ ] NO  Care Discussed with Consultants/Other Providers [ x] YES  [ ] NO  Imaging Personally Reviewed:  [ ] YES  [ ] NO

## 2024-03-02 NOTE — DIETITIAN INITIAL EVALUATION ADULT - PERTINENT MEDS FT
MEDICATIONS  (STANDING):  acetaminophen     Tablet .. 975 milliGRAM(s) Oral every 8 hours  carbidopa/levodopa  25/100 1 Tablet(s) Oral <User Schedule>  enoxaparin Injectable 40 milliGRAM(s) SubCutaneous every 24 hours  lidocaine   4% Patch 1 Patch Transdermal daily  senna 2 Tablet(s) Oral at bedtime    MEDICATIONS  (PRN):  aluminum hydroxide/magnesium hydroxide/simethicone Suspension 30 milliLiter(s) Oral every 4 hours PRN Dyspepsia  polyethylene glycol 3350 17 Gram(s) Oral daily PRN Constipation  traMADol 25 milliGRAM(s) Oral three times a day PRN Moderate Pain (4 - 6)

## 2024-03-02 NOTE — CONSULT NOTE ADULT - ASSESSMENT
76 y/o man of Parkinson's disease since 2022 and follows with Dr. Ortega, prior colon CA w/ ostomy, prior prostate CA s/p radiation cystitis, blurry vision 2/2 cataracts baseline, who presented to ED at [facility] on 2/27 s/p mechanical fall at home. Per wife patient ambulated without devise in the house, and has had multiple falls due to poor balance from Parkinson's but usually falls on his buttocks. He was found to have a left hip fracture and underwent Left hip replacement with Dr. Diaz. Post operatively he had orthostatic hypotension which self resolved. His hospital course was uneventful and he was evaluated by PT and OT who recommended acute rehab. He was admitted to [facility] on 3/1/24.     Rehab: Functional and gait instability:  - C/w PT/OT per primary team     Ortho: Left hip fracture s/p hip replacement   -Continue Lovenox for DVT prophylaxis and then switch to aspirin 81mg BID for total of 6 weeks    Neuro: Parkinson's related motor symptoms bradykinesia, falls, rigidity, tremors  -Continue Sinemet 25/100mg 1 tab at 8am, 2pm, and 10pm  -Movement disorders following  - Orthostatic hypotension likely related to PD     DVT Prophylaxis  - Lovenox  - Clarisse

## 2024-03-03 PROCEDURE — 71045 X-RAY EXAM CHEST 1 VIEW: CPT | Mod: 26

## 2024-03-03 PROCEDURE — 99233 SBSQ HOSP IP/OBS HIGH 50: CPT

## 2024-03-03 PROCEDURE — 99232 SBSQ HOSP IP/OBS MODERATE 35: CPT

## 2024-03-03 RX ADMIN — ENOXAPARIN SODIUM 40 MILLIGRAM(S): 100 INJECTION SUBCUTANEOUS at 06:02

## 2024-03-03 RX ADMIN — CARBIDOPA AND LEVODOPA 1 TABLET(S): 25; 100 TABLET ORAL at 08:16

## 2024-03-03 RX ADMIN — CARBIDOPA AND LEVODOPA 1 TABLET(S): 25; 100 TABLET ORAL at 22:20

## 2024-03-03 RX ADMIN — CARBIDOPA AND LEVODOPA 1 TABLET(S): 25; 100 TABLET ORAL at 14:11

## 2024-03-03 RX ADMIN — SENNA PLUS 2 TABLET(S): 8.6 TABLET ORAL at 22:20

## 2024-03-03 NOTE — PROGRESS NOTE ADULT - ASSESSMENT
Imp: Patient with diagnosis of    Left JENNIFER      admitted for comprehensive acute rehabilitation.    Plan:  - Continue PT/OT/SLP  - DVT prophylaxis  - Skin- Turn q2h, check skin daily  - Continue current medications; patient medically stable.   -Active issues-   - Patient is stable to continue current rehabilitation program.   - cough on exam today, discussed with medicine during IDR on 3/3 AM, plan for chest XR  - noted pattern of decreased ostomy output followed by large volume output, discussed with medicine during IDR 3/3 AM

## 2024-03-03 NOTE — PROGRESS NOTE ADULT - SUBJECTIVE AND OBJECTIVE BOX
Hospitalist: Bg Pham DO    CHIEF COMPLAINT: Patient is a 78y old  male who presents with a chief complaint of Left hip replacement (02 Mar 2024 16:04)      SUBJECTIVE / OVERNIGHT EVENTS: Patient seen and examined. No acute events overnight. Pain well controlled and patient without any complaints.    MEDICATIONS  (STANDING):  acetaminophen     Tablet .. 975 milliGRAM(s) Oral every 8 hours  carbidopa/levodopa  25/100 1 Tablet(s) Oral <User Schedule>  enoxaparin Injectable 40 milliGRAM(s) SubCutaneous every 24 hours  lidocaine   4% Patch 1 Patch Transdermal daily  senna 2 Tablet(s) Oral at bedtime    MEDICATIONS  (PRN):  aluminum hydroxide/magnesium hydroxide/simethicone Suspension 30 milliLiter(s) Oral every 4 hours PRN Dyspepsia  polyethylene glycol 3350 17 Gram(s) Oral daily PRN Constipation  traMADol 25 milliGRAM(s) Oral three times a day PRN Moderate Pain (4 - 6)      VITALS:  T(F): 98.3 (03-03-24 @ 08:22), Max: 98.3 (03-02-24 @ 15:20)  HR: 65 (03-03-24 @ 08:22) (65 - 65)  BP: 150/76 (03-03-24 @ 08:22) (150/76 - 150/76)  RR: 16 (03-03-24 @ 08:22) (16 - 16)  SpO2: 95% (03-03-24 @ 08:22)      REVIEW OF SYSTEMS:  For ROV please refer back to H&P     PHYSICAL EXAM:  Gen - NAD, Comfortable  HEENT - NCAT, EOMI, MMM  Neck - Supple, No limited ROM  Pulm - CTAB, No wheeze, No rhonchi, No crackles  Cardiovascular - RRR, S1S2  Chest - good chest expansion, good respiratory effort  Abdomen - Soft, NT/ND, +BS        LABS:              11.5                 143  | 30   | 25           7.16  >-----------< 247     ------------------------< 101                   36.5                 4.9  | 106  | 0.89                                         Ca 9.3   Mg x     Ph x           TPro  6.8  /  Alb  2.6      TBili  0.6  /  DBili  x         AST  52  /  ALT  24            AlkPhos  59          MICROBIOLOGY:  Urinalysis Basic - ( 02 Mar 2024 06:49 )    Color: x / Appearance: x / SG: x / pH: x  Gluc: 101 mg/dL / Ketone: x  / Bili: x / Urobili: x   Blood: x / Protein: x / Nitrite: x   Leuk Esterase: x / RBC: x / WBC x   Sq Epi: x / Non Sq Epi: x / Bacteria: x      RADIOLOGY & ADDITIONAL TESTS:    Imaging Personally Reviewed:    [X] Consultant(s) Notes Reviewed:  [X] Care Discussed with Consultants/Other Providers:

## 2024-03-03 NOTE — PROGRESS NOTE ADULT - ASSESSMENT
78 y/o man of Parkinson's disease since 2022 and follows with Dr. Ortega, prior colon CA w/ ostomy, prior prostate CA s/p radiation cystitis, blurry vision 2/2 cataracts baseline, who presented to ED at [facility] on 2/27 s/p mechanical fall at home. Per wife patient ambulated without devise in the house, and has had multiple falls due to poor balance from Parkinson's but usually falls on his buttocks. He was found to have a left hip fracture and underwent Left hip replacement with Dr. Diaz. Post operatively he had orthostatic hypotension which self resolved. His hospital course was uneventful and he was evaluated by PT and OT who recommended acute rehab. He was admitted to [facility] on 3/1/24.     Rehab: Functional and gait instability:  - C/w PT/OT per primary team     Resp:   will get Chest Xray     Ortho: Left hip fracture s/p hip replacement   -Continue Lovenox for DVT prophylaxis and then switch to aspirin 81mg BID for total of 6 weeks    Neuro: Parkinson's related motor symptoms bradykinesia, falls, rigidity, tremors  -Continue Sinemet 25/100mg 1 tab at 8am, 2pm, and 10pm  -Movement disorders following  - Orthostatic hypotension likely related to PD     DVT Prophylaxis  - Lovenox  Ebony Robb

## 2024-03-03 NOTE — PROGRESS NOTE ADULT - SUBJECTIVE AND OBJECTIVE BOX
Cc: Gait dysfunction    HPI: Patient with no new medical issues overnight.  Pain controlled, no chest pain, no N/V, no Fevers/Chills.   No other new ROS  Has been tolerating rehabilitation program.    acetaminophen     Tablet .. 975 milliGRAM(s) Oral every 8 hours  aluminum hydroxide/magnesium hydroxide/simethicone Suspension 30 milliLiter(s) Oral every 4 hours PRN  carbidopa/levodopa  25/100 1 Tablet(s) Oral <User Schedule>  enoxaparin Injectable 40 milliGRAM(s) SubCutaneous every 24 hours  lidocaine   4% Patch 1 Patch Transdermal daily  polyethylene glycol 3350 17 Gram(s) Oral daily PRN  senna 2 Tablet(s) Oral at bedtime  traMADol 25 milliGRAM(s) Oral three times a day PRN      T(C): 36.8 (03-03-24 @ 08:22), Max: 36.8 (03-02-24 @ 15:20)  HR: 65 (03-03-24 @ 08:22) (65 - 65)  BP: 150/76 (03-03-24 @ 08:22) (150/76 - 150/76)  RR: 16 (03-03-24 @ 08:22) (16 - 16)  SpO2: 95% (03-03-24 @ 08:22) (95% - 98%)    In NAD  initially confused upon waking the patient from sleep, able to orient to person, place, and year & month  HEENT- EOMI  Heart- RRR, S1S2  Lungs- CTA bl. repeatedly coughing during exam today  Abd- + BS, NT, soft, no significant distension  Ext- No calf pain  Neuro- Exam unchanged

## 2024-03-04 LAB
ALBUMIN SERPL ELPH-MCNC: 2.7 G/DL — LOW (ref 3.3–5)
ALP SERPL-CCNC: 58 U/L — SIGNIFICANT CHANGE UP (ref 40–120)
ALT FLD-CCNC: 18 U/L — SIGNIFICANT CHANGE UP (ref 10–45)
ANION GAP SERPL CALC-SCNC: 5 MMOL/L — SIGNIFICANT CHANGE UP (ref 5–17)
AST SERPL-CCNC: 53 U/L — HIGH (ref 10–40)
BASOPHILS # BLD AUTO: 0.06 K/UL — SIGNIFICANT CHANGE UP (ref 0–0.2)
BASOPHILS NFR BLD AUTO: 1 % — SIGNIFICANT CHANGE UP (ref 0–2)
BILIRUB SERPL-MCNC: 0.5 MG/DL — SIGNIFICANT CHANGE UP (ref 0.2–1.2)
BUN SERPL-MCNC: 23 MG/DL — SIGNIFICANT CHANGE UP (ref 7–23)
CALCIUM SERPL-MCNC: 9.5 MG/DL — SIGNIFICANT CHANGE UP (ref 8.4–10.5)
CHLORIDE SERPL-SCNC: 103 MMOL/L — SIGNIFICANT CHANGE UP (ref 96–108)
CO2 SERPL-SCNC: 31 MMOL/L — SIGNIFICANT CHANGE UP (ref 22–31)
CREAT SERPL-MCNC: 0.91 MG/DL — SIGNIFICANT CHANGE UP (ref 0.5–1.3)
EGFR: 86 ML/MIN/1.73M2 — SIGNIFICANT CHANGE UP
EOSINOPHIL # BLD AUTO: 0.43 K/UL — SIGNIFICANT CHANGE UP (ref 0–0.5)
EOSINOPHIL NFR BLD AUTO: 6.9 % — HIGH (ref 0–6)
GLUCOSE SERPL-MCNC: 98 MG/DL — SIGNIFICANT CHANGE UP (ref 70–99)
HCT VFR BLD CALC: 36.9 % — LOW (ref 39–50)
HGB BLD-MCNC: 12 G/DL — LOW (ref 13–17)
IMM GRANULOCYTES NFR BLD AUTO: 1.8 % — HIGH (ref 0–0.9)
LYMPHOCYTES # BLD AUTO: 1.02 K/UL — SIGNIFICANT CHANGE UP (ref 1–3.3)
LYMPHOCYTES # BLD AUTO: 16.4 % — SIGNIFICANT CHANGE UP (ref 13–44)
MCHC RBC-ENTMCNC: 28.5 PG — SIGNIFICANT CHANGE UP (ref 27–34)
MCHC RBC-ENTMCNC: 32.5 GM/DL — SIGNIFICANT CHANGE UP (ref 32–36)
MCV RBC AUTO: 87.6 FL — SIGNIFICANT CHANGE UP (ref 80–100)
MONOCYTES # BLD AUTO: 0.54 K/UL — SIGNIFICANT CHANGE UP (ref 0–0.9)
MONOCYTES NFR BLD AUTO: 8.7 % — SIGNIFICANT CHANGE UP (ref 2–14)
NEUTROPHILS # BLD AUTO: 4.06 K/UL — SIGNIFICANT CHANGE UP (ref 1.8–7.4)
NEUTROPHILS NFR BLD AUTO: 65.2 % — SIGNIFICANT CHANGE UP (ref 43–77)
NRBC # BLD: 0 /100 WBCS — SIGNIFICANT CHANGE UP (ref 0–0)
PLATELET # BLD AUTO: 321 K/UL — SIGNIFICANT CHANGE UP (ref 150–400)
POTASSIUM SERPL-MCNC: 4.8 MMOL/L — SIGNIFICANT CHANGE UP (ref 3.5–5.3)
POTASSIUM SERPL-SCNC: 4.8 MMOL/L — SIGNIFICANT CHANGE UP (ref 3.5–5.3)
PROT SERPL-MCNC: 6.9 G/DL — SIGNIFICANT CHANGE UP (ref 6–8.3)
RBC # BLD: 4.21 M/UL — SIGNIFICANT CHANGE UP (ref 4.2–5.8)
RBC # FLD: 13.8 % — SIGNIFICANT CHANGE UP (ref 10.3–14.5)
SODIUM SERPL-SCNC: 139 MMOL/L — SIGNIFICANT CHANGE UP (ref 135–145)
WBC # BLD: 6.22 K/UL — SIGNIFICANT CHANGE UP (ref 3.8–10.5)
WBC # FLD AUTO: 6.22 K/UL — SIGNIFICANT CHANGE UP (ref 3.8–10.5)

## 2024-03-04 PROCEDURE — 99232 SBSQ HOSP IP/OBS MODERATE 35: CPT

## 2024-03-04 RX ORDER — CARBIDOPA AND LEVODOPA 25; 100 MG/1; MG/1
1 TABLET ORAL
Refills: 0 | Status: DISCONTINUED | OUTPATIENT
Start: 2024-03-04 | End: 2024-03-05

## 2024-03-04 RX ORDER — CARBIDOPA AND LEVODOPA 25; 100 MG/1; MG/1
1 TABLET ORAL
Refills: 0 | Status: DISCONTINUED | OUTPATIENT
Start: 2024-03-04 | End: 2024-03-04

## 2024-03-04 RX ORDER — MIDODRINE HYDROCHLORIDE 2.5 MG/1
2.5 TABLET ORAL
Refills: 0 | Status: DISCONTINUED | OUTPATIENT
Start: 2024-03-04 | End: 2024-03-06

## 2024-03-04 RX ORDER — POLYETHYLENE GLYCOL 3350 17 G/17G
17 POWDER, FOR SOLUTION ORAL DAILY
Refills: 0 | Status: DISCONTINUED | OUTPATIENT
Start: 2024-03-04 | End: 2024-03-15

## 2024-03-04 RX ADMIN — TRAMADOL HYDROCHLORIDE 25 MILLIGRAM(S): 50 TABLET ORAL at 18:34

## 2024-03-04 RX ADMIN — CARBIDOPA AND LEVODOPA 1 TABLET(S): 25; 100 TABLET ORAL at 08:29

## 2024-03-04 RX ADMIN — POLYETHYLENE GLYCOL 3350 17 GRAM(S): 17 POWDER, FOR SOLUTION ORAL at 11:11

## 2024-03-04 RX ADMIN — TRAMADOL HYDROCHLORIDE 25 MILLIGRAM(S): 50 TABLET ORAL at 19:34

## 2024-03-04 RX ADMIN — CARBIDOPA AND LEVODOPA 1 TABLET(S): 25; 100 TABLET ORAL at 12:57

## 2024-03-04 RX ADMIN — ENOXAPARIN SODIUM 40 MILLIGRAM(S): 100 INJECTION SUBCUTANEOUS at 06:36

## 2024-03-04 RX ADMIN — CARBIDOPA AND LEVODOPA 1 TABLET(S): 25; 100 TABLET ORAL at 18:02

## 2024-03-04 NOTE — CHART NOTE - NSCHARTNOTEFT_GEN_A_CORE
Reviewed chart, approached Pt twice, before noon, Pt was being seen by movement disorders fellow, and then at 3 p.m. Pt was asleep, his wife declined for him. Agreed to attempt tomorrow. Will follow up.

## 2024-03-04 NOTE — PROGRESS NOTE ADULT - ASSESSMENT
ASSESSMENT/PLAN  77 year old male of Parkinson's disease since 2022 and follows with Dr. Ortega, prior colon CA w/ ostomy, prior prostate CA s/p radiation cystitis, blurry vision 2/2 cataracts baseline, who presented to ED at [facility] on 2/27 s/p mechanical fall at home. Per wife patient ambulated without devise in the house, and has had multiple falls due to poor balance from Parkinson's but usually falls on his buttocks. He was found to have a left hip fracture and underwent Left hip replacement with Dr. Diaz. Post operatively he had orthostatic hypotension which self resolved. His hospital course was uneventful and he was evaluated by PT and OT who recommended acute rehab. He was admitted to [facility] on 3/1/24.     #Left hip fracture s/p hip replacement now with gait Instability, ADL impairments and Functional impairments  - Start Comprehensive Rehab Program of PT/OT/SLP  -Continue Lovenox for DVT prophylaxis and then switch to aspirin 81mg BID for total of 6 weeks  -Continue total hip precautions with abduction pillow in bed   -Continue weight bearing as tolerated    ------------------------------------------------------  #Parkinson's related motor symptoms bradykinesia, falls, rigidity, tremors  -Continue Sinemet 25/100mg 1 tab at 8am, 2pm, and 10pm  -Movement disorders following    --------------------------------------------------------  Parkinson's related non-motor symptoms    #Orthostatic hypotension  -Significant OH at admission   -Started midodrine 2.5 mg AM on 3/4    #Pain control  - Tylenol PRN  - Tramadol PRN  - Lidocaine patch    #GI/Bowel Management/Constipation  - Continue Senna at bedtime   - Miralax daily 3/4    #Bladder management  - Continue to monitor PVR q 8 hours (SC if > 400)  -Monitor UO    ----------------------------------------------------------  Concurrent medical problems    #DVT Prophylaxis  - Lovenox  - TEDs     #Skin:  - Left hip incision with aquacel dressing    FEN   - Diet - Regular with thins  - Dysphagia  SLP - evaluation and treatment    Precautions / PROPHYLAXIS:   - Falls  - ortho: Weight bearing status: WBAT with hip precautions   - Lungs: Incentive Spirometer   - Pressure injury/Skin: OOB to Chair, PT/OT        Follow up     [facility]  [address] [place] [id number]  Phone: [phone]  Fax: [phone]  Follow Up Time:     Adonis Diaz  Orthopaedic Surgery  [address] [place] [address] [address]  [place] [address]  Phone: [phone]  Fax: [phone]

## 2024-03-04 NOTE — PROGRESS NOTE ADULT - SUBJECTIVE AND OBJECTIVE BOX
78y old  male who presents with a chief complaint of Left hip replacement      Patient seen and examined. No acute events overnight.   no new complaints    Vital Signs Last 24 Hrs  T(C): 36.6 (04 Mar 2024 08:19), Max: 36.7 (03 Mar 2024 22:13)  T(F): 97.8 (04 Mar 2024 08:19), Max: 98.1 (03 Mar 2024 22:13)  HR: 71 (04 Mar 2024 08:19) (71 - 76)  BP: 110/64 (04 Mar 2024 08:19) (110/64 - 169/84)  BP(mean): --  RR: 16 (04 Mar 2024 08:19) (16 - 16)  SpO2: 96% (04 Mar 2024 08:19) (96% - 96%)    Parameters below as of 04 Mar 2024 08:19  Patient On (Oxygen Delivery Method): room air    GENERAL- NAD  EAR/NOSE/MOUTH/THROAT - MMM  EYES- CAN, conjunctiva and Sclera clear  NECK- supple  RESPIRATORY-  clear to auscultation bilaterally, non laboured breathing  CARDIOVASCULAR - SIS2, RRR  GI - soft NT BS present  EXTREMITIES- no pedal edema  NEUROLOGY- no gross focal deficits  PSYCHIATRY- AAO X 3                12.0                 139  | 31   | 23           6.22  >-----------< 321     ------------------------< 98                    36.9                 4.8  | 103  | 0.91                                         Ca 9.5   Mg x     Ph x          MEDICATIONS  (STANDING):  carbidopa/levodopa  25/100 1 Tablet(s) Oral <User Schedule>  enoxaparin Injectable 40 milliGRAM(s) SubCutaneous every 24 hours  lidocaine   4% Patch 1 Patch Transdermal daily  midodrine. 2.5 milliGRAM(s) Oral <User Schedule>  polyethylene glycol 3350 17 Gram(s) Oral daily  senna 2 Tablet(s) Oral at bedtime    MEDICATIONS  (PRN):  aluminum hydroxide/magnesium hydroxide/simethicone Suspension 30 milliLiter(s) Oral every 4 hours PRN Dyspepsia  traMADol 25 milliGRAM(s) Oral three times a day PRN Moderate Pain (4 - 6)

## 2024-03-04 NOTE — PROGRESS NOTE ADULT - ASSESSMENT
78 y/o man of Parkinson's disease since 2022 and follows with Dr. Ortega, prior colon CA w/ ostomy, prior prostate CA s/p radiation cystitis, blurry vision 2/2 cataracts baseline, who presented to ED at [facility] on 2/27 s/p mechanical fall at home. Per wife patient ambulated without devise in the house, and has had multiple falls due to poor balance from Parkinson's but usually falls on his buttocks. He was found to have a left hip fracture and underwent Left hip replacement with Dr. Diaz. Post operatively he had orthostatic hypotension which self resolved. His hospital course was uneventful and he was evaluated by PT and OT who recommended acute rehab. He was admitted to [facility] on 3/1/24- pt/ot/dvt ppx       s/p fall, Left hip fracture s/p hip replacement   -pt/ot/dvt ppx  -Continue Lovenox for DVT prophylaxis and then switch to aspirin 81mg BID for total of 6 weeks    Parkinson's  bradykinesia, falls, rigidity, tremors  -Continue Sinemet     Orthostatic hypotension likely related to PD   - midodrine    DVT Prophylaxis  - Lovenox    will follow  d/w rehab team

## 2024-03-04 NOTE — PROGRESS NOTE ADULT - SUBJECTIVE AND OBJECTIVE BOX
SUBJECTIVE/ROS: Patient in bed, reports being very tired and sleepy at this time after therapy sessions in the morning, declines being examined. Reviewed history with wife at bedside. Symptom onset approximately two years ago with balance issues and falls in the context of preexisting ET. 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 Clear benefit from levodopa per family with mild fluctuations during the day. Will observe course of fluctuations during hospitalization. Significant OH, started low dose midodrine. Denies chest pain, fever, chills, nausea, vomiting, abdominal pain, headache, or BLE pain.     HPI:  77 year old male of Parkinsons disease since 2022 and follows with Dr. Ortega, prior colon CA w/ ostomy, prior prostate CA s/p radiation cystitis, blurry vision 2/2 cataracts baseline, who presented to ED at [facility] on 2/27 s/p mechanical fall at home. Per wife patient ambulated without devise in the house, and has had multiple falls due to poor balance from Parkinsons, but usually falls on his buttocks. He was found to have a left hip fracture and underwent Left hip replacement with Dr. Diaz. Post operatively he had orthostatic hypotension which self resolved. His hospital course was uneventful and he was evaluated by PT and OT who recommended acute rehab. He was admitted to [facility] on 3/1/24.  (01 Mar 2024 13:18)    PHYSICAL EXAM    Vital Signs Last 24 Hrs  T(C): 36.6 (04 Mar 2024 08:19), Max: 36.7 (03 Mar 2024 22:13)  T(F): 97.8 (04 Mar 2024 08:19), Max: 98.1 (03 Mar 2024 22:13)  HR: 71 (04 Mar 2024 08:19) (71 - 76)  BP: 110/64 (04 Mar 2024 08:19) (110/64 - 169/84)  BP(mean): --  RR: 16 (04 Mar 2024 08:19) (16 - 16)  SpO2: 96% (04 Mar 2024 08:19) (96% - 96%)    Parameters below as of 04 Mar 2024 08:19  Patient On (Oxygen Delivery Method): room air    PHYSICAL EXAM  Constitutional - NAD, Comfortable  HEENT - NCAT, EOMI  Neck - mild rigidity with positive pillow sign, no limited ROM, no meningeal signs   Chest - Breathing comfortably in room air   Extremities - TEDs on, no edema, no tenderness on palpation    Neurologic Exam - sleepy, arousable on verbal stimulation, follows commands, no cranial deficits. Moderately bradykinetic. Declines continuing the exam at present due to tiredness.     RECENT LABS:                          12.0   6.22  )-----------( 321      ( 04 Mar 2024 05:30 )             36.9     03-04    139  |  103  |  23  ----------------------------<  98  4.8   |  31  |  0.91    Ca    9.5      04 Mar 2024 05:30    TPro  6.9  /  Alb  2.7<L>  /  TBili  0.5  /  DBili  x   /  AST  53<H>  /  ALT  18  /  AlkPhos  58  03-04    LIVER FUNCTIONS - ( 04 Mar 2024 05:30 )  Alb: 2.7 g/dL / Pro: 6.9 g/dL / ALK PHOS: 58 U/L / ALT: 18 U/L / AST: 53 U/L / GGT: x             MEDICATIONS  (STANDING):  carbidopa/levodopa  25/100 1 Tablet(s) Oral <User Schedule>  enoxaparin Injectable 40 milliGRAM(s) SubCutaneous every 24 hours  lidocaine   4% Patch 1 Patch Transdermal daily  midodrine. 2.5 milliGRAM(s) Oral <User Schedule>  polyethylene glycol 3350 17 Gram(s) Oral daily  senna 2 Tablet(s) Oral at bedtime    MEDICATIONS  (PRN):  aluminum hydroxide/magnesium hydroxide/simethicone Suspension 30 milliLiter(s) Oral every 4 hours PRN Dyspepsia  traMADol 25 milliGRAM(s) Oral three times a day PRN Moderate Pain (4 - 6)   SUBJECTIVE/ROS: Patient in bed, reports being very tired and sleepy at this time after therapy sessions in the morning, declines being examined. Reviewed history with wife at bedside. Symptom onset approximately two years ago with balance issues and falls in the context of preexisting ET. 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 Clear benefit from levodopa per family with mild fluctuations during the day. No hallucinations and no behavioral disturbances. Will observe course of fluctuations during hospitalization. Significant OH, started low dose midodrine. Denies chest pain, fever, chills, nausea, vomiting, abdominal pain, headache, or BLE pain.     HPI:  77 year old male of Parkinsons disease since 2022 and follows with Dr. Ortega, prior colon CA w/ ostomy, prior prostate CA s/p radiation cystitis, blurry vision 2/2 cataracts baseline, who presented to ED at [facility] on 2/27 s/p mechanical fall at home. Per wife patient ambulated without devise in the house, and has had multiple falls due to poor balance from Parkinsons, but usually falls on his buttocks. He was found to have a left hip fracture and underwent Left hip replacement with Dr. Diaz. Post operatively he had orthostatic hypotension which self resolved. His hospital course was uneventful and he was evaluated by PT and OT who recommended acute rehab. He was admitted to [facility] on 3/1/24.  (01 Mar 2024 13:18)    PHYSICAL EXAM    Vital Signs Last 24 Hrs  T(C): 36.6 (04 Mar 2024 08:19), Max: 36.7 (03 Mar 2024 22:13)  T(F): 97.8 (04 Mar 2024 08:19), Max: 98.1 (03 Mar 2024 22:13)  HR: 71 (04 Mar 2024 08:19) (71 - 76)  BP: 110/64 (04 Mar 2024 08:19) (110/64 - 169/84)  BP(mean): --  RR: 16 (04 Mar 2024 08:19) (16 - 16)  SpO2: 96% (04 Mar 2024 08:19) (96% - 96%)    Parameters below as of 04 Mar 2024 08:19  Patient On (Oxygen Delivery Method): room air    PHYSICAL EXAM  Constitutional - NAD, Comfortable  HEENT - NCAT, EOMI  Neck - mild rigidity with positive pillow sign, no limited ROM, no meningeal signs   Chest - Breathing comfortably in room air   Extremities - TEDs on, no edema, no tenderness on palpation. Dressing on left hip incision in good condition, minimal quantity of bloody discharge.     Neurologic Exam - sleepy, arousable on verbal stimulation, follows commands, no cranial deficits. Moderately bradykinetic. Declines continuing the exam at present due to tiredness.     RECENT LABS:                          12.0   6.22  )-----------( 321      ( 04 Mar 2024 05:30 )             36.9     03-04    139  |  103  |  23  ----------------------------<  98  4.8   |  31  |  0.91    Ca    9.5      04 Mar 2024 05:30    TPro  6.9  /  Alb  2.7<L>  /  TBili  0.5  /  DBili  x   /  AST  53<H>  /  ALT  18  /  AlkPhos  58  03-04    LIVER FUNCTIONS - ( 04 Mar 2024 05:30 )  Alb: 2.7 g/dL / Pro: 6.9 g/dL / ALK PHOS: 58 U/L / ALT: 18 U/L / AST: 53 U/L / GGT: x             MEDICATIONS  (STANDING):  carbidopa/levodopa  25/100 1 Tablet(s) Oral <User Schedule>  enoxaparin Injectable 40 milliGRAM(s) SubCutaneous every 24 hours  lidocaine   4% Patch 1 Patch Transdermal daily  midodrine. 2.5 milliGRAM(s) Oral <User Schedule>  polyethylene glycol 3350 17 Gram(s) Oral daily  senna 2 Tablet(s) Oral at bedtime    MEDICATIONS  (PRN):  aluminum hydroxide/magnesium hydroxide/simethicone Suspension 30 milliLiter(s) Oral every 4 hours PRN Dyspepsia  traMADol 25 milliGRAM(s) Oral three times a day PRN Moderate Pain (4 - 6)

## 2024-03-05 PROCEDURE — 99232 SBSQ HOSP IP/OBS MODERATE 35: CPT

## 2024-03-05 RX ORDER — CARBIDOPA AND LEVODOPA 25; 100 MG/1; MG/1
1 TABLET ORAL
Refills: 0 | Status: DISCONTINUED | OUTPATIENT
Start: 2024-03-05 | End: 2024-03-15

## 2024-03-05 RX ADMIN — CARBIDOPA AND LEVODOPA 1 TABLET(S): 25; 100 TABLET ORAL at 21:33

## 2024-03-05 RX ADMIN — CARBIDOPA AND LEVODOPA 1 TABLET(S): 25; 100 TABLET ORAL at 13:06

## 2024-03-05 RX ADMIN — SENNA PLUS 2 TABLET(S): 8.6 TABLET ORAL at 21:33

## 2024-03-05 RX ADMIN — MIDODRINE HYDROCHLORIDE 2.5 MILLIGRAM(S): 2.5 TABLET ORAL at 06:52

## 2024-03-05 RX ADMIN — CARBIDOPA AND LEVODOPA 1 TABLET(S): 25; 100 TABLET ORAL at 16:01

## 2024-03-05 RX ADMIN — CARBIDOPA AND LEVODOPA 1 TABLET(S): 25; 100 TABLET ORAL at 08:07

## 2024-03-05 RX ADMIN — ENOXAPARIN SODIUM 40 MILLIGRAM(S): 100 INJECTION SUBCUTANEOUS at 06:52

## 2024-03-05 NOTE — PROGRESS NOTE ADULT - SUBJECTIVE AND OBJECTIVE BOX
SUBJECTIVE/ROS: Patient seen while in OT, denies pain currently. No events overnight, sleep was intermittent with instances of mild disorientation at waking up reported by his wife. No agitation or distress. No hallucinations. Denies chest pain, fever, chills, nausea, vomiting, abdominal pain, headache, or BLE pain.     HPI:  77 year old male of Parkinsons disease since 2022 and follows with Dr. Ortega, prior colon CA w/ ostomy, prior prostate CA s/p radiation cystitis, blurry vision 2/2 cataracts baseline, who presented to ED at [facility] on 2/27 s/p mechanical fall at home. Per wife patient ambulated without devise in the house, and has had multiple falls due to poor balance from Parkinson's, but usually falls on his buttocks. He was found to have a left hip fracture and underwent Left hip replacement with Dr. Diaz. Post operatively he had orthostatic hypotension which self resolved. His hospital course was uneventful and he was evaluated by PT and OT who recommended acute rehab. He was admitted to [facility] on 3/1/24.  (01 Mar 2024 13:18)    PHYSICAL EXAM    Vital Signs Last 24 Hrs  T(C): 36.2 (05 Mar 2024 07:15), Max: 36.5 (04 Mar 2024 21:41)  T(F): 97.2 (05 Mar 2024 07:15), Max: 97.7 (04 Mar 2024 21:41)  HR: 68 (05 Mar 2024 07:15) (66 - 68)  BP: 126/71 (05 Mar 2024 07:15) (126/71 - 138/59)  BP(mean): --  RR: 16 (05 Mar 2024 07:15) (16 - 16)  SpO2: 94% (05 Mar 2024 07:15) (93% - 94%)    Parameters below as of 05 Mar 2024 07:15  Patient On (Oxygen Delivery Method): room air    PHYSICAL EXAM  Constitutional - NAD, Comfortable  HEENT - NCAT, EOMI   Chest - Breathing comfortably in room air   Extremities - TEDs on, no edema   Neurologic Exam - awake, follows commands, no cranial deficits. Moderately bradykinetic, moderate bilateral kinetic and postural tremor of the hands.      MEDICATIONS  (STANDING):  carbidopa/levodopa  25/100 1 Tablet(s) Oral <User Schedule>  enoxaparin Injectable 40 milliGRAM(s) SubCutaneous every 24 hours  lidocaine   4% Patch 1 Patch Transdermal daily  midodrine. 2.5 milliGRAM(s) Oral <User Schedule>  polyethylene glycol 3350 17 Gram(s) Oral daily  senna 2 Tablet(s) Oral at bedtime    MEDICATIONS  (PRN):  aluminum hydroxide/magnesium hydroxide/simethicone Suspension 30 milliLiter(s) Oral every 4 hours PRN Dyspepsia  traMADol 25 milliGRAM(s) Oral three times a day PRN Moderate Pain (4 - 6)   SUBJECTIVE/ROS: Patient seen while in OT, denies pain currently. No events overnight, sleep was intermittent with instances of mild disorientation at waking up reported by his wife. No agitation or distress. No hallucinations. In consideration of mild motor fluctuations and no behavioral issues overnight will increase levodopa to 25/100 mg 4 times daily. Denies chest pain, fever, chills, nausea, vomiting, abdominal pain, headache, or BLE pain.     HPI:  77 year old male of Parkinson's disease since 2022 and follows with Dr. Ortega, prior colon CA w/ ostomy, prior prostate CA s/p radiation cystitis, blurry vision 2/2 cataracts baseline, who presented to ED at [facility] on 2/27 s/p mechanical fall at home. Per wife patient ambulated without devise in the house, and has had multiple falls due to poor balance from Parkinson's, but usually falls on his buttocks. He was found to have a left hip fracture and underwent Left hip replacement with Dr. Diaz. Post operatively he had orthostatic hypotension which self resolved. His hospital course was uneventful and he was evaluated by PT and OT who recommended acute rehab. He was admitted to [facility] on 3/1/24.  (01 Mar 2024 13:18)    PHYSICAL EXAM    Vital Signs Last 24 Hrs  T(C): 36.2 (05 Mar 2024 07:15), Max: 36.5 (04 Mar 2024 21:41)  T(F): 97.2 (05 Mar 2024 07:15), Max: 97.7 (04 Mar 2024 21:41)  HR: 68 (05 Mar 2024 07:15) (66 - 68)  BP: 126/71 (05 Mar 2024 07:15) (126/71 - 138/59)  BP(mean): --  RR: 16 (05 Mar 2024 07:15) (16 - 16)  SpO2: 94% (05 Mar 2024 07:15) (93% - 94%)    Parameters below as of 05 Mar 2024 07:15  Patient On (Oxygen Delivery Method): room air    PHYSICAL EXAM  Constitutional - NAD, Comfortable  HEENT - NCAT, EOMI   Chest - Breathing comfortably in room air   Extremities - TEDs on, no edema   Neurologic Exam - awake, follows commands, no cranial deficits. Moderately bradykinetic, moderate bilateral kinetic and postural tremor of the hands.      MEDICATIONS  (STANDING):  carbidopa/levodopa  25/100 1 Tablet(s) Oral <User Schedule>  enoxaparin Injectable 40 milliGRAM(s) SubCutaneous every 24 hours  lidocaine   4% Patch 1 Patch Transdermal daily  midodrine. 2.5 milliGRAM(s) Oral <User Schedule>  polyethylene glycol 3350 17 Gram(s) Oral daily  senna 2 Tablet(s) Oral at bedtime    MEDICATIONS  (PRN):  aluminum hydroxide/magnesium hydroxide/simethicone Suspension 30 milliLiter(s) Oral every 4 hours PRN Dyspepsia  traMADol 25 milliGRAM(s) Oral three times a day PRN Moderate Pain (4 - 6)

## 2024-03-05 NOTE — PROGRESS NOTE ADULT - ASSESSMENT
ASSESSMENT/PLAN  77 year old male of Parkinson's disease since 2022 and follows with Dr. Ortega, prior colon CA w/ ostomy, prior prostate CA s/p radiation cystitis, blurry vision 2/2 cataracts baseline, who presented to ED at [facility] on 2/27 s/p mechanical fall at home. Per wife patient ambulated without devise in the house, and has had multiple falls due to poor balance from Parkinson's but usually falls on his buttocks. He was found to have a left hip fracture and underwent Left hip replacement with Dr. Diaz. Post operatively he had orthostatic hypotension which self resolved. His hospital course was uneventful and he was evaluated by PT and OT who recommended acute rehab. He was admitted to [facility] on 3/1/24.     #Left hip fracture s/p hip replacement now with gait Instability, ADL impairments and Functional impairments  - Start Comprehensive Rehab Program of PT/OT/SLP  -Continue Lovenox for DVT prophylaxis and then switch to aspirin 81mg BID for total of 6 weeks  -Continue total hip precautions with abduction pillow in bed   -Continue weight bearing as tolerated    ------------------------------------------------------  #Parkinson's related motor symptoms bradykinesia, falls, rigidity, tremors  -Continue Sinemet 25/100mg 1 tab at 8am, 2pm, and 10pm  -Movement disorders following    --------------------------------------------------------  Parkinson's related non-motor symptoms    #Orthostatic hypotension  -Significant OH at admission   -Started midodrine 2.5 mg AM on 3/4 - improving     #Pain control  - Tylenol PRN  - Tramadol PRN  - Lidocaine patch    #GI/Bowel Management/Constipation  - Continue Senna at bedtime   - Miralax daily 3/4  - Last BM 3/4    #Bladder management  - Continue to monitor PVR q 8 hours (SC if > 400)  -Monitor UO    ----------------------------------------------------------  Concurrent medical problems    #DVT Prophylaxis  - Lovenox  - TEDs     #Skin:  - Left hip incision with aquacel dressing    FEN   - Diet - Regular with thins  - Dysphagia  SLP - evaluation and treatment    Precautions / PROPHYLAXIS:   - Falls  - ortho: Weight bearing status: WBAT with hip precautions   - Lungs: Incentive Spirometer   - Pressure injury/Skin: OOB to Chair, PT/OT        Follow up     Sonido Alvarez  [facility]  [address] [place] [id number]  Phone: [phone]  Fax: [phone]  Follow Up Time:     Adonis Diaz  Orthopaedic Surgery  [address] [place] [address] [address]  [place] [address]  Phone: [phone]  Fax: [phone]   ASSESSMENT/PLAN  77 year old male of Parkinson's disease since 2022 and follows with Dr. Ortega, prior colon CA w/ ostomy, prior prostate CA s/p radiation cystitis, blurry vision 2/2 cataracts baseline, who presented to ED at [facility] on 2/27 s/p mechanical fall at home. Per wife patient ambulated without devise in the house, and has had multiple falls due to poor balance from Parkinson's but usually falls on his buttocks. He was found to have a left hip fracture and underwent Left hip replacement with Dr. Diaz. Post operatively he had orthostatic hypotension which self resolved. His hospital course was uneventful and he was evaluated by PT and OT who recommended acute rehab. He was admitted to [facility] on 3/1/24.     #Left hip fracture s/p hip replacement now with gait Instability, ADL impairments and Functional impairments  - Start Comprehensive Rehab Program of PT/OT/SLP  -Continue Lovenox for DVT prophylaxis and then switch to aspirin 81mg BID for total of 6 weeks  -Continue total hip precautions with abduction pillow in bed   -Continue weight bearing as tolerated    ------------------------------------------------------  #Parkinson's related motor symptoms bradykinesia, falls, rigidity, tremors  -Increased Sinemet 25/100mg TID to 1 tab at 8am, 12pm, 4pm and 8pm  -Movement disorders following    --------------------------------------------------------  Parkinson's related non-motor symptoms    #Orthostatic hypotension  -Significant OH at admission   -Started midodrine 2.5 mg AM on 3/4 - improving     #Pain control  - Tylenol PRN  - Tramadol PRN  - Lidocaine patch    #GI/Bowel Management/Constipation  - Continue Senna at bedtime   - Miralax daily 3/4  - Last BM 3/4    #Bladder management  - Continue to monitor PVR q 8 hours (SC if > 400)  -Monitor UO    ----------------------------------------------------------  Concurrent medical problems    #DVT Prophylaxis  - Lovenox  - TEDs     #Skin:  - Left hip incision with aquacel dressing    FEN   - Diet - Regular with thins  - Dysphagia  SLP - evaluation and treatment    Precautions / PROPHYLAXIS:   - Falls  - ortho: Weight bearing status: WBAT with hip precautions   - Lungs: Incentive Spirometer   - Pressure injury/Skin: OOB to Chair, PT/OT        Follow up     Sonido Alvarez  [facility]  [address] [place] [id number]  Phone: [phone]  Fax: [phone]  Follow Up Time:     Adonis Diaz  Orthopaedic Surgery  [address] [place] [address], [address]  [place] [address]  Phone: [phone]  Fax: [phone]

## 2024-03-05 NOTE — PROGRESS NOTE ADULT - SUBJECTIVE AND OBJECTIVE BOX
Pt was seen for 30 minutes with his wife (Maribeth) for supportive tx. Pt c/o low energy, fatigue. Reviewed chart, approached Pt for an initial consult, introduced the role of neuropsychology in the rehab team, and explained the nature and purpose of the consult. Pt is a 78 y/o male with PMHx of Parkinson's disease since 2022 and follows with Dr. Ortega, prior colon CA w/ ostomy, prior prostate CA s/p radiation cystitis, blurry vision 2/2 cataracts baseline, who presented to ED at [facility] on 2/27 s/p mechanical fall at home. Per wife patient ambulated without devise in the house, and has had multiple falls due to poor balance from Parkinsons, but usually falls on his buttocks. He was found to have a left hip fracture and underwent Left hip replacement. Post operatively he had orthostatic hypotension which self resolved. Pt agreed to participate, Pt was well related and displayed good humor during the session. His speech was sometimes hypophonic and difficult to hear. Session focused on establishing rapport with Pt, gathering relevant biographical information, and assessing Pt's current emotional functioning. Pt provided information about his medical hx and social hx, which was sometimes complemented by his wife. Pt answered all questions during the clinical interview in order to elicit emotional symptoms. Pt reported experiencing insomnia, fatigue and low energy. Support and encouragement were provided.

## 2024-03-05 NOTE — PROGRESS NOTE ADULT - ASSESSMENT
78 y/o man of Parkinson's disease since 2022 and follows with Dr. Ortega, prior colon CA w/ ostomy, prior prostate CA s/p radiation cystitis, blurry vision 2/2 cataracts baseline, who presented to ED at [facility] on 2/27 s/p mechanical fall at home. Per wife patient ambulated without devise in the house, and has had multiple falls due to poor balance from Parkinson's but usually falls on his buttocks. He was found to have a left hip fracture and underwent Left hip replacement with Dr. Diaz. Post operatively he had orthostatic hypotension which self resolved. His hospital course was uneventful and he was evaluated by PT and OT who recommended acute rehab. He was admitted to [facility] on 3/1/24- pt/ot/dvt ppx       s/p fall, Left hip fracture s/p hip replacement   -pt/ot/dvt ppx  -Continue Lovenox for DVT prophylaxis and then switch to aspirin 81mg BID for total of 6 weeks    Parkinson's  bradykinesia, falls, rigidity, tremors  -Continue Sinemet     Orthostatic hypotension likely related to PD   - midodrine    DVT Prophylaxis  - Lovenox    will follow  d/w rehab team

## 2024-03-05 NOTE — PROGRESS NOTE ADULT - ASSESSMENT
Pt alert, fair attention, relatively intact expressive language, thought processes - goal-directed , no morbid thought contents elicited, depressed affect, euthymic mood, denied current AH/VH, denied SI/HI/I/P, calm behavior. Plan: Continue the assessment process.

## 2024-03-05 NOTE — PROGRESS NOTE ADULT - SUBJECTIVE AND OBJECTIVE BOX
78y old  male who presents with a chief complaint of Left hip replacement      Patient seen and examined. No acute events overnight.   no new complaints  no n/v, no sob, no cp.    Vital Signs Last 24 Hrs  T(C): 36.2 (05 Mar 2024 07:15), Max: 36.5 (04 Mar 2024 21:41)  T(F): 97.2 (05 Mar 2024 07:15), Max: 97.7 (04 Mar 2024 21:41)  HR: 68 (05 Mar 2024 07:15) (66 - 68)  BP: 126/71 (05 Mar 2024 07:15) (126/71 - 138/59)  BP(mean): --  RR: 16 (05 Mar 2024 07:15) (16 - 16)  SpO2: 94% (05 Mar 2024 07:15) (93% - 94%)    Parameters below as of 05 Mar 2024 07:15  Patient On (Oxygen Delivery Method): room air      GENERAL- NAD  EAR/NOSE/MOUTH/THROAT - MMM  EYES- CAN, conjunctiva and Sclera clear  NECK- supple  RESPIRATORY-  clear to auscultation bilaterally, non laboured breathing  CARDIOVASCULAR - SIS2, RRR  GI - soft NT BS present  EXTREMITIES- no pedal edema  NEUROLOGY- no gross focal deficits  PSYCHIATRY- AAO X 3                  12.0                 139  | 31   | 23           6.22  >-----------< 321     ------------------------< 98                    36.9                 4.8  | 103  | 0.91                                         Ca 9.5   Mg x     Ph x        MEDICATIONS  (STANDING):  carbidopa/levodopa  25/100 1 Tablet(s) Oral <User Schedule>  enoxaparin Injectable 40 milliGRAM(s) SubCutaneous every 24 hours  lidocaine   4% Patch 1 Patch Transdermal daily  midodrine. 2.5 milliGRAM(s) Oral <User Schedule>  polyethylene glycol 3350 17 Gram(s) Oral daily  senna 2 Tablet(s) Oral at bedtime    MEDICATIONS  (PRN):  aluminum hydroxide/magnesium hydroxide/simethicone Suspension 30 milliLiter(s) Oral every 4 hours PRN Dyspepsia  traMADol 25 milliGRAM(s) Oral three times a day PRN Moderate Pain (4 - 6)

## 2024-03-06 ENCOUNTER — TRANSCRIPTION ENCOUNTER (OUTPATIENT)
Age: 79
End: 2024-03-06

## 2024-03-06 LAB
ALBUMIN SERPL ELPH-MCNC: 3.2 G/DL — LOW (ref 3.3–5)
ALP SERPL-CCNC: 73 U/L — SIGNIFICANT CHANGE UP (ref 40–120)
ALT FLD-CCNC: 47 U/L — HIGH (ref 10–45)
ANION GAP SERPL CALC-SCNC: 7 MMOL/L — SIGNIFICANT CHANGE UP (ref 5–17)
APPEARANCE UR: CLEAR — SIGNIFICANT CHANGE UP
AST SERPL-CCNC: 39 U/L — SIGNIFICANT CHANGE UP (ref 10–40)
BACTERIA # UR AUTO: NEGATIVE /HPF — SIGNIFICANT CHANGE UP
BILIRUB SERPL-MCNC: 0.4 MG/DL — SIGNIFICANT CHANGE UP (ref 0.2–1.2)
BILIRUB UR-MCNC: NEGATIVE — SIGNIFICANT CHANGE UP
BUN SERPL-MCNC: 28 MG/DL — HIGH (ref 7–23)
CALCIUM SERPL-MCNC: 9.7 MG/DL — SIGNIFICANT CHANGE UP (ref 8.4–10.5)
CHLORIDE SERPL-SCNC: 104 MMOL/L — SIGNIFICANT CHANGE UP (ref 96–108)
CO2 SERPL-SCNC: 29 MMOL/L — SIGNIFICANT CHANGE UP (ref 22–31)
COLOR SPEC: YELLOW — SIGNIFICANT CHANGE UP
CREAT SERPL-MCNC: 0.95 MG/DL — SIGNIFICANT CHANGE UP (ref 0.5–1.3)
DIFF PNL FLD: NEGATIVE — SIGNIFICANT CHANGE UP
EGFR: 82 ML/MIN/1.73M2 — SIGNIFICANT CHANGE UP
EPI CELLS # UR: 0 — SIGNIFICANT CHANGE UP
GLUCOSE BLDC GLUCOMTR-MCNC: 96 MG/DL — SIGNIFICANT CHANGE UP (ref 70–99)
GLUCOSE SERPL-MCNC: 125 MG/DL — HIGH (ref 70–99)
GLUCOSE UR QL: NEGATIVE MG/DL — SIGNIFICANT CHANGE UP
HCT VFR BLD CALC: 37.5 % — LOW (ref 39–50)
HGB BLD-MCNC: 12.1 G/DL — LOW (ref 13–17)
KETONES UR-MCNC: NEGATIVE MG/DL — SIGNIFICANT CHANGE UP
LEUKOCYTE ESTERASE UR-ACNC: NEGATIVE — SIGNIFICANT CHANGE UP
MAGNESIUM SERPL-MCNC: 2.1 MG/DL — SIGNIFICANT CHANGE UP (ref 1.6–2.6)
MCHC RBC-ENTMCNC: 28.3 PG — SIGNIFICANT CHANGE UP (ref 27–34)
MCHC RBC-ENTMCNC: 32.3 GM/DL — SIGNIFICANT CHANGE UP (ref 32–36)
MCV RBC AUTO: 87.8 FL — SIGNIFICANT CHANGE UP (ref 80–100)
NITRITE UR-MCNC: NEGATIVE — SIGNIFICANT CHANGE UP
NRBC # BLD: 0 /100 WBCS — SIGNIFICANT CHANGE UP (ref 0–0)
PH UR: 6 — SIGNIFICANT CHANGE UP (ref 5–8)
PHOSPHATE SERPL-MCNC: 4 MG/DL — SIGNIFICANT CHANGE UP (ref 2.5–4.5)
PLATELET # BLD AUTO: 427 K/UL — HIGH (ref 150–400)
POTASSIUM SERPL-MCNC: 4 MMOL/L — SIGNIFICANT CHANGE UP (ref 3.5–5.3)
POTASSIUM SERPL-SCNC: 4 MMOL/L — SIGNIFICANT CHANGE UP (ref 3.5–5.3)
PROT SERPL-MCNC: 7.6 G/DL — SIGNIFICANT CHANGE UP (ref 6–8.3)
PROT UR-MCNC: NEGATIVE MG/DL — SIGNIFICANT CHANGE UP
RBC # BLD: 4.27 M/UL — SIGNIFICANT CHANGE UP (ref 4.2–5.8)
RBC # FLD: 13.9 % — SIGNIFICANT CHANGE UP (ref 10.3–14.5)
RBC CASTS # UR COMP ASSIST: 0 /HPF — SIGNIFICANT CHANGE UP (ref 0–4)
SODIUM SERPL-SCNC: 140 MMOL/L — SIGNIFICANT CHANGE UP (ref 135–145)
SP GR SPEC: 1 — SIGNIFICANT CHANGE UP (ref 1–1.03)
TROPONIN I, HIGH SENSITIVITY RESULT: 8.3 NG/L — SIGNIFICANT CHANGE UP
UROBILINOGEN FLD QL: 0.2 MG/DL — SIGNIFICANT CHANGE UP (ref 0.2–1)
WBC # BLD: 8.11 K/UL — SIGNIFICANT CHANGE UP (ref 3.8–10.5)
WBC # FLD AUTO: 8.11 K/UL — SIGNIFICANT CHANGE UP (ref 3.8–10.5)
WBC UR QL: 0 /HPF — SIGNIFICANT CHANGE UP (ref 0–5)

## 2024-03-06 PROCEDURE — 99233 SBSQ HOSP IP/OBS HIGH 50: CPT

## 2024-03-06 PROCEDURE — 71045 X-RAY EXAM CHEST 1 VIEW: CPT | Mod: 26

## 2024-03-06 PROCEDURE — 90832 PSYTX W PT 30 MINUTES: CPT

## 2024-03-06 PROCEDURE — 93010 ELECTROCARDIOGRAM REPORT: CPT

## 2024-03-06 RX ORDER — SODIUM CHLORIDE 9 MG/ML
1000 INJECTION INTRAMUSCULAR; INTRAVENOUS; SUBCUTANEOUS ONCE
Refills: 0 | Status: COMPLETED | OUTPATIENT
Start: 2024-03-06 | End: 2024-03-06

## 2024-03-06 RX ORDER — MIDODRINE HYDROCHLORIDE 2.5 MG/1
5 TABLET ORAL
Refills: 0 | Status: DISCONTINUED | OUTPATIENT
Start: 2024-03-06 | End: 2024-03-11

## 2024-03-06 RX ADMIN — CARBIDOPA AND LEVODOPA 1 TABLET(S): 25; 100 TABLET ORAL at 15:52

## 2024-03-06 RX ADMIN — CARBIDOPA AND LEVODOPA 1 TABLET(S): 25; 100 TABLET ORAL at 20:27

## 2024-03-06 RX ADMIN — CARBIDOPA AND LEVODOPA 1 TABLET(S): 25; 100 TABLET ORAL at 12:10

## 2024-03-06 RX ADMIN — CARBIDOPA AND LEVODOPA 1 TABLET(S): 25; 100 TABLET ORAL at 08:08

## 2024-03-06 RX ADMIN — MIDODRINE HYDROCHLORIDE 5 MILLIGRAM(S): 2.5 TABLET ORAL at 12:10

## 2024-03-06 RX ADMIN — MIDODRINE HYDROCHLORIDE 5 MILLIGRAM(S): 2.5 TABLET ORAL at 10:25

## 2024-03-06 RX ADMIN — ENOXAPARIN SODIUM 40 MILLIGRAM(S): 100 INJECTION SUBCUTANEOUS at 06:36

## 2024-03-06 RX ADMIN — SODIUM CHLORIDE 250 MILLILITER(S): 9 INJECTION INTRAMUSCULAR; INTRAVENOUS; SUBCUTANEOUS at 10:25

## 2024-03-06 RX ADMIN — MIDODRINE HYDROCHLORIDE 2.5 MILLIGRAM(S): 2.5 TABLET ORAL at 06:38

## 2024-03-06 NOTE — PROGRESS NOTE ADULT - ASSESSMENT
78 y/o man of Parkinson's disease since 2022 and follows with Dr. Ortega, prior colon CA w/ ostomy, prior prostate CA s/p radiation cystitis, blurry vision 2/2 cataracts baseline, who presented to ED at [facility] on 2/27 s/p mechanical fall at home. Per wife patient ambulated without devise in the house, and has had multiple falls due to poor balance from Parkinson's but usually falls on his buttocks. He was found to have a left hip fracture and underwent Left hip replacement with Dr. Diaz. Post operatively he had orthostatic hypotension which self resolved. His hospital course was uneventful and he was evaluated by PT and OT who recommended acute rehab. He was admitted to [facility] on 3/1/24- pt/ot/dvt ppx     s/p fall, Left hip fracture s/p hip replacement   -pt/ot/dvt ppx  -Continue Lovenox for DVT prophylaxis and then switch to aspirin 81mg BID for total of 6 weeks    s/p RRT 3/6/24 due to hypotensive  syncope, BP- 90's  iv bolus- BP improved   midodrine increased to 5  bid  labs and w/u-trop  ekg, cxr, ua- non impressive  monitor BP    Parkinson's  bradykinesia, falls, rigidity, tremors  -Continue Sinemet     Orthostatic hypotension likely related to PD   - midodrine    DVT Prophylaxis  - Lovenox    will follow  d/w rehab team     time spent in e/m visit- 50 min

## 2024-03-06 NOTE — PROGRESS NOTE ADULT - SUBJECTIVE AND OBJECTIVE BOX
SUBJECTIVE/ROS:   RR team was called this morning as patient had a syncopal event while working with PT this morning  patient now back to baseline, able to answer orientation questions  No overnight issues  still has OH plan to increase the midodrine today to 5mg BID     HPI:  77 year old male of Parkinson's disease since 2022 and follows with Dr. Ortega, prior colon CA w/ ostomy, prior prostate CA s/p radiation cystitis, blurry vision 2/2 cataracts baseline, who presented to ED at [facility] on 2/27 s/p mechanical fall at home. Per wife patient ambulated without devise in the house, and has had multiple falls due to poor balance from Parkinson's, but usually falls on his buttocks. He was found to have a left hip fracture and underwent Left hip replacement with Dr. Diaz. Post operatively he had orthostatic hypotension which self resolved. His hospital course was uneventful and he was evaluated by PT and OT who recommended acute rehab. He was admitted to [facility] on 3/1/24.  (01 Mar 2024 13:18)    Vital Signs Last 24 Hrs  T(C): 36.5 (06 Mar 2024 08:15), Max: 36.8 (05 Mar 2024 20:24)  T(F): 97.7 (06 Mar 2024 08:15), Max: 98.3 (05 Mar 2024 20:24)  HR: 77 (06 Mar 2024 09:20) (69 - 77)  BP: 155/74 (06 Mar 2024 09:20) (112/61 - 155/74)  BP(mean): --  RR: 16 (06 Mar 2024 09:20) (16 - 16)  SpO2: 95% (06 Mar 2024 09:20) (95% - 95%)    Parameters below as of 06 Mar 2024 09:20  Patient On (Oxygen Delivery Method): room air        PHYSICAL EXAM  Constitutional - NAD, Comfortable  HEENT - NCAT, EOMI   Chest - Breathing comfortably in room air   Extremitiesno edema   Neurologic Exam -   awake, alert   restricted vertical gaze  severe hypophonia with dysarthria   mild rest tremor noted   bradykinesia worse on rt> left      MEDICATIONS  (STANDING):  carbidopa/levodopa  25/100 1 Tablet(s) Oral <User Schedule>  enoxaparin Injectable 40 milliGRAM(s) SubCutaneous every 24 hours  lidocaine   4% Patch 1 Patch Transdermal daily  midodrine. 2.5 milliGRAM(s) Oral <User Schedule>  polyethylene glycol 3350 17 Gram(s) Oral daily  senna 2 Tablet(s) Oral at bedtime    MEDICATIONS  (PRN):  aluminum hydroxide/magnesium hydroxide/simethicone Suspension 30 milliLiter(s) Oral every 4 hours PRN Dyspepsia  traMADol 25 milliGRAM(s) Oral three times a day PRN Moderate Pain (4 - 6)   SUBJECTIVE/ROS:   RR team was called this morning as patient had a syncopal event while working with PT this morning  patient now back to baseline, able to answer orientation questions      HPI:  77 year old male of Parkinson's disease since 2022 and follows with Dr. Ortega, prior colon CA w/ ostomy, prior prostate CA s/p radiation cystitis, blurry vision 2/2 cataracts baseline, who presented to ED at [facility] on 2/27 s/p mechanical fall at home. Per wife patient ambulated without devise in the house, and has had multiple falls due to poor balance from Parkinson's, but usually falls on his buttocks. He was found to have a left hip fracture and underwent Left hip replacement with Dr. Diaz. Post operatively he had orthostatic hypotension which self resolved. His hospital course was uneventful and he was evaluated by PT and OT who recommended acute rehab. He was admitted to [facility] on 3/1/24.  (01 Mar 2024 13:18)    Vital Signs Last 24 Hrs  T(C): 36.5 (06 Mar 2024 08:15), Max: 36.8 (05 Mar 2024 20:24)  T(F): 97.7 (06 Mar 2024 08:15), Max: 98.3 (05 Mar 2024 20:24)  HR: 77 (06 Mar 2024 09:20) (69 - 77)  BP: 155/74 (06 Mar 2024 09:20) (112/61 - 155/74)  BP(mean): --  RR: 16 (06 Mar 2024 09:20) (16 - 16)  SpO2: 95% (06 Mar 2024 09:20) (95% - 95%)    Parameters below as of 06 Mar 2024 09:20  Patient On (Oxygen Delivery Method): room air        PHYSICAL EXAM  Constitutional - NAD, Comfortable  HEENT - NCAT, EOMI   Chest - Breathing comfortably in room air   Extremitiesno edema   Neurologic Exam -   awake, alert   restricted vertical gaze  severe hypophonia with dysarthria   mild rest tremor noted   bradykinesia worse on rt> left      MEDICATIONS  (STANDING):  carbidopa/levodopa  25/100 1 Tablet(s) Oral <User Schedule>  enoxaparin Injectable 40 milliGRAM(s) SubCutaneous every 24 hours  lidocaine   4% Patch 1 Patch Transdermal daily  midodrine. 2.5 milliGRAM(s) Oral <User Schedule>  polyethylene glycol 3350 17 Gram(s) Oral daily  senna 2 Tablet(s) Oral at bedtime    MEDICATIONS  (PRN):  aluminum hydroxide/magnesium hydroxide/simethicone Suspension 30 milliLiter(s) Oral every 4 hours PRN Dyspepsia  traMADol 25 milliGRAM(s) Oral three times a day PRN Moderate Pain (4 - 6)

## 2024-03-06 NOTE — CHART NOTE - NSCHARTNOTEFT_GEN_A_CORE
Nutrition Follow Up Note  Source: Medical Record [X] Patient [X] Family [X] pt's wife provided info         Diet: Regular, Halal, Ensure Plus High Protein (provides 350 kcal, 20 g protein/serving) BID  Pt tolerating diet with fair-good PO intake, eating % of meals per nursing flow sheets. Pt/pt's wife report pt has been eating well. Discussed food preferences to optimize PO intake. Pt has been drinking Ensure Plus High Protein (provides 350 kcal, 20 g protein/serving) - usually has better acceptance of supplement later in the day/after dinner.     Enteral/Parenteral Nutrition: N/A    Current Weight: 185.4 lbs (3-1)    Pertinent Medications: MEDICATIONS  (STANDING):  carbidopa/levodopa  25/100 1 Tablet(s) Oral <User Schedule>  enoxaparin Injectable 40 milliGRAM(s) SubCutaneous every 24 hours  lidocaine   4% Patch 1 Patch Transdermal daily  midodrine. 5 milliGRAM(s) Oral <User Schedule>  polyethylene glycol 3350 17 Gram(s) Oral daily  senna 2 Tablet(s) Oral at bedtime    MEDICATIONS  (PRN):  aluminum hydroxide/magnesium hydroxide/simethicone Suspension 30 milliLiter(s) Oral every 4 hours PRN Dyspepsia  traMADol 25 milliGRAM(s) Oral three times a day PRN Moderate Pain (4 - 6)      Pertinent Labs:  03-06 Na140 mmol/L Glu 125 mg/dL<H> K+ 4.0 mmol/L Cr  0.95 mg/dL BUN 28 mg/dL<H> 03-06 Phos 4.0 mg/dL 03-06 Alb 3.2 g/dL<L>        Skin: surgical incision per nursing flow sheets     Edema: No edema per nursing flow sheets     Last BM: on 3-4 ostomy Per nursing flowsheets     Estimated Needs:   [X] No Change since Previous Assessment  [ ] Recalculated:     Previous Nutrition Diagnosis:   Increased nutrient needs    Nutrition Diagnosis is [X] Ongoing  - on Ensure Plus High Protein (provides 350 kcal, 20 g protein/serving) BID       New Nutrition Diagnosis: [X] Not Applicable  [ ] Inadequate Protein Energy Intake   [ ] Inadequate Oral Intake   [ ] Excessive Energy Intake   [ ] Increased Nutrient Needs   [ ] Obesity   [ ] Altered GI Function   [ ] Unintended Weight Loss   [ ] Food & Nutrition Related Knowledge Deficit  [ ] Limited Adherence to nutrition related recommendations   [ ] Malnutrition      Interventions:   1. Recommend continuing with current plan of care  2. Encourage PO intake  3. obtain and honor food preferences as able      Monitoring & Evaluation:   [X] Weights   [X] PO Intake   [X] Follow Up (Per Protocol)  [X] Tolerance to Diet Prescription   [X] Other: Labs     RD Remains Available.  Minerva Fields, RD

## 2024-03-06 NOTE — PROGRESS NOTE ADULT - ASSESSMENT
ASSESSMENT/PLAN  77 year old male of Parkinson's disease since 2022 and follows with Dr. Ortega, prior colon CA w/ ostomy, prior prostate CA s/p radiation cystitis, blurry vision 2/2 cataracts baseline, who presented to ED at [facility] on 2/27 s/p mechanical fall at home. Per wife patient ambulated without devise in the house, and has had multiple falls due to poor balance from Parkinson's but usually falls on his buttocks. He was found to have a left hip fracture and underwent Left hip replacement with Dr. Diaz. Post operatively he had orthostatic hypotension which self resolved. His hospital course was uneventful and he was evaluated by PT and OT who recommended acute rehab. He was admitted to [facility] on 3/1/24.   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    #Left hip fracture s/p hip replacement now with gait Instability, ADL impairments and Functional impairments  - Start Comprehensive Rehab Program of PT/OT/SLP  -Continue Lovenox for DVT prophylaxis and then switch to aspirin 81mg BID for total of 6 weeks  -Continue total hip precautions with abduction pillow in bed   -Continue weight bearing as tolerated    ------------------------------------------------------  #Parkinson's related motor symptoms bradykinesia, falls, rigidity, tremors  -Increased Sinemet 25/100mg TID to 1 tab at 8am, 12pm, 4pm and 8pm  -Movement disorders following    --------------------------------------------------------  Parkinson's related non-motor symptoms    #Orthostatic hypotension  Syncopal event on 3/6  500 cc bolus  basic labs, EKG       #Pain control  - Tylenol PRN  - Tramadol PRN  - Lidocaine patch    #GI/Bowel Management/Constipation  - Continue Senna at bedtime   - Miralax daily 3/4  - Last BM 3/4    #Bladder management  - Continue to monitor PVR q 8 hours (SC if > 400)  -Monitor UO    ----------------------------------------------------------  Concurrent medical problems    #DVT Prophylaxis  - Lovenox  - TEDs     #Skin:  - Left hip incision with aquacel dressing    FEN   - Diet - Regular with thins  - Dysphagia  SLP - evaluation and treatment    Precautions / PROPHYLAXIS:   - Falls  - ortho: Weight bearing status: WBAT with hip precautions   - Lungs: Incentive Spirometer   - Pressure injury/Skin: OOB to Chair, PT/OT        Follow up     Sonido Alvarez  [address] [address]  Phone: [phone]  Fax: [phone]  Follow Up Time:     Adonis Diaz  Orthopaedic Surgery  [address] [place] [address] [address]  [place] [address]  Phone: [phone]  Fax: [phone]   ASSESSMENT/PLAN  77 year old male of Parkinson's disease since 2022 and follows with Dr. Ortega, prior colon CA w/ ostomy, prior prostate CA s/p radiation cystitis, blurry vision 2/2 cataracts baseline, who presented to ED at [facility] on 2/27 s/p mechanical fall at home. Per wife patient ambulated without devise in the house, and has had multiple falls due to poor balance from Parkinson's but usually falls on his buttocks. He was found to have a left hip fracture and underwent Left hip replacement with Dr. Diaz. Post operatively he had orthostatic hypotension which self resolved. His hospital course was uneventful and he was evaluated by PT and OT who recommended acute rehab. He was admitted to [facility] on 3/1/24.   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    #Left hip fracture s/p hip replacement now with gait Instability, ADL impairments and Functional impairments  - Start Comprehensive Rehab Program of PT/OT/SLP  -Continue Lovenox for DVT prophylaxis and then switch to aspirin 81mg BID for total of 6 weeks  -Continue total hip precautions with abduction pillow in bed   -Continue weight bearing as tolerated    ------------------------------------------------------  #Parkinson's related motor symptoms bradykinesia, falls, rigidity, tremors  -Increased Sinemet 25/100mg TID to 1 tab at 8am, 12pm, 4pm and 8pm  -Movement disorders following    --------------------------------------------------------  Parkinson's related non-motor symptoms    #Orthostatic hypotension  Syncopal event on 3/6  500 cc bolus  basic labs, EKG   increase the midodrine to 2.5 BID- 3/6    #Pain control  - Tylenol PRN  - Tramadol PRN  - Lidocaine patch    #GI/Bowel Management/Constipation  - Continue Senna at bedtime   - Miralax daily 3/4  - Last BM 3/4    #Bladder management  - Continue to monitor PVR q 8 hours (SC if > 400)  -Monitor UO    ----------------------------------------------------------  Concurrent medical problems    #DVT Prophylaxis  - Lovenox  - TEDs     #Skin:  - Left hip incision with aquacel dressing    FEN   - Diet - Regular with thins  - Dysphagia  SLP - evaluation and treatment    Precautions / PROPHYLAXIS:   - Falls  - ortho: Weight bearing status: WBAT with hip precautions   - Lungs: Incentive Spirometer   - Pressure injury/Skin: OOB to Chair, PT/OT        Follow up     Sonido Alvarez  [facility]  [address] [place] [address]  Phone: [phone]  Fax: [phone]  Follow Up Time:     Adonis Diaz  Orthopaedic Surgery  [address] [place] [address], [address]  [place] [address]  Phone: [phone]  Fax: [phone]   ASSESSMENT/PLAN  77 year old male of Parkinson's disease since 2022 and follows with Dr. Ortega, prior colon CA w/ ostomy, prior prostate CA s/p radiation cystitis, blurry vision 2/2 cataracts baseline, who presented to ED at [facility] on 2/27 s/p mechanical fall at home. Per wife patient ambulated without devise in the house, and has had multiple falls due to poor balance from Parkinson's but usually falls on his buttocks. He was found to have a left hip fracture and underwent Left hip replacement with Dr. Diaz. Post operatively he had orthostatic hypotension which self resolved. His hospital course was uneventful and he was evaluated by PT and OT who recommended acute rehab. He was admitted to [facility] on 3/1/24.   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    #Left hip fracture s/p hip replacement now with gait Instability, ADL impairments and Functional impairments  - Start Comprehensive Rehab Program of PT/OT/SLP  -Continue Lovenox for DVT prophylaxis and then switch to aspirin 81mg BID for total of 6 weeks  -Continue total hip precautions with abduction pillow in bed   -Continue weight bearing as tolerated    ------------------------------------------------------  #Parkinson's related motor symptoms bradykinesia, falls, rigidity, tremors  -Increased Sinemet 25/100mg TID to 1 tab at 8am, 12pm, 4pm and 8pm  -Movement disorders following    --------------------------------------------------------  Parkinson's related non-motor symptoms    #Orthostatic hypotension  Syncopal event on 3/6  500 cc bolus  basic labs, EKG   increase the midodrine to 5mg BID- 3/6    #Pain control  - Tylenol PRN  - Tramadol PRN  - Lidocaine patch    #GI/Bowel Management/Constipation  - Continue Senna at bedtime   - Miralax daily 3/4  - Last BM 3/4    #Bladder management  - Continue to monitor PVR q 8 hours (SC if > 400)  -Monitor UO    ----------------------------------------------------------  Concurrent medical problems    #DVT Prophylaxis  - Lovenox  - TEDs     #Skin:  - Left hip incision with aquacel dressing    FEN   - Diet - Regular with thins  - Dysphagia  SLP - evaluation and treatment    Precautions / PROPHYLAXIS:   - Falls  - ortho: Weight bearing status: WBAT with hip precautions   - Lungs: Incentive Spirometer   - Pressure injury/Skin: OOB to Chair, PT/OT        Follow up     Sonido Alvarez.  [facility]  [address] [place] [address]  Phone: [phone]  Fax: [phone]  Follow Up Time:     Adonis Diaz  Orthopaedic Surgery  [address] [place] [address], [address]  [place] [address]  Phone: [phone]  Fax: [phone]   ASSESSMENT/PLAN  77 year old male of Parkinson's disease since 2022 and follows with Dr. Ortega, prior colon CA w/ ostomy, prior prostate CA s/p radiation cystitis, blurry vision 2/2 cataracts baseline, who presented to ED at [facility] on 2/27 s/p mechanical fall at home. Per wife patient ambulated without devise in the house, and has had multiple falls due to poor balance from Parkinson's but usually falls on his buttocks. He was found to have a left hip fracture and underwent Left hip replacement with Dr. Diaz. Post operatively he had orthostatic hypotension which self resolved. His hospital course was uneventful and he was evaluated by PT and OT who recommended acute rehab. He was admitted to [facility] on 3/1/24.   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    #Left hip fracture s/p hip replacement now with gait Instability, ADL impairments and Functional impairments  - Start Comprehensive Rehab Program of PT/OT/SLP  -Continue Lovenox for DVT prophylaxis and then switch to aspirin 81mg BID for total of 6 weeks  -Continue total hip precautions with abduction pillow in bed   -Continue weight bearing as tolerated    ------------------------------------------------------  #Parkinson's related motor symptoms bradykinesia, falls, rigidity, tremors  -Increased Sinemet 25/100mg TID to 1 tab at 8am, 12pm, 4pm and 8pm  -Movement disorders following    --------------------------------------------------------  Parkinson's related non-motor symptoms    #Orthostatic hypotension  Syncopal event on 3/6  500 cc bolus  basic labs,mag, Phosp, troponin EKG, UA ordered  increase the midodrine to 5mg BID- 3/6    #Pain control  - Tylenol PRN  - Tramadol PRN  - Lidocaine patch    #GI/Bowel Management/Constipation  - Continue Senna at bedtime   - Miralax daily 3/4  - Last BM 3/4    #Bladder management  - Continue to monitor PVR q 8 hours (SC if > 400)  -Monitor UO    ----------------------------------------------------------  Concurrent medical problems    #DVT Prophylaxis  - Lovenox  - TEDs     #Skin:  - Left hip incision with aquacel dressing    FEN   - Diet - Regular with thins  - Dysphagia  SLP - evaluation and treatment    Precautions / PROPHYLAXIS:   - Falls  - ortho: Weight bearing status: WBAT with hip precautions   - Lungs: Incentive Spirometer   - Pressure injury/Skin: OOB to Chair, PT/OT        Follow up     Sonido Alvarez  [address] [address]  Phone: [phone]  Fax: [phone]  Follow Up Time:     Adonis Diaz  Orthopaedic Surgery  [address] [place] [address] [address]  [place] [address]  Phone: [phone]  Fax: [phone]

## 2024-03-06 NOTE — PROGRESS NOTE ADULT - SUBJECTIVE AND OBJECTIVE BOX
78y old  male who presents with a chief complaint of Left hip replacement      Patient seen and examined. No acute events overnight.   s/P RRT this morning inthe PT gym- per therapist pt walked 10 feet then sat down and was noted to syncopise with some body shaking. 2 episodes x 10 seconds and 5 sec each. resolved spontaneisly.  back to baseline MS- no n/v, no sob, no headaches or dizziness.    Vital Signs Last 24 Hrs  T(C): 36.5 (06 Mar 2024 08:15), Max: 36.8 (05 Mar 2024 20:24)  T(F): 97.7 (06 Mar 2024 08:15), Max: 98.3 (05 Mar 2024 20:24)  HR: 70 (06 Mar 2024 12:10) (69 - 77)  BP: 127/65 (06 Mar 2024 12:10) (112/61 - 155/74)  BP(mean): --  RR: 16 (06 Mar 2024 12:10) (16 - 16)  SpO2: 96% (06 Mar 2024 12:10) (95% - 96%)    Parameters below as of 06 Mar 2024 12:10  Patient On (Oxygen Delivery Method): room air        GENERAL- NAD  EAR/NOSE/MOUTH/THROAT - MMM  EYES- CAN, conjunctiva and Sclera clear  NECK- supple  RESPIRATORY-  clear to auscultation bilaterally, non laboured breathing  CARDIOVASCULAR - SIS2, RRR  GI - soft NT BS present  EXTREMITIES- no pedal edema  NEUROLOGY- no gross focal deficits  PSYCHIATRY- AAO X 3                 12.1                 140  | 29   | 28           8.11  >-----------< 427     ------------------------< 125                   37.5                 4.0  | 104  | 0.95                                         Ca 9.7   Mg 2.1   Ph 4.0      Urinalysis + Microscopic Examination (03.06.24 @ 12:25)    pH Urine: 6.0   Urine Appearance: Clear   Color: Yellow   Specific Gravity: 1.005   Protein, Urine: Negative mg/dL   Glucose Qualitative, Urine: Negative mg/dL   Ketone - Urine: Negative mg/dL   Blood, Urine: Negative   Bilirubin: Negative   Urobilinogen: 0.2 mg/dL   Leukocyte Esterase Concentration: Negative   Nitrite: Negative   White Blood Cell - Urine: 0 /HPF   Red Blood Cell - Urine: 0 /HPF   Bacteria: Negative /HPF   Squamous Epithelial Cells: 0      Labs reviewed:     CXR personally reviewed: < from: Xray Chest 1 View-PORTABLE IMMEDIATE (Xray Chest 1 View-PORTABLE IMMEDIATE .) (03.06.24 @ 10:59) >  IMPRESSION: No acute finding.    < end of copied text >      ECG reviewed and interpreted: sinus at 77    Troponin I, High Sensitivity Result: 8.3: Serial measurements of high sensitivity troponin I (hs TnI) showing rapid  upward or downward changes suggest acute myocardial injury.  Please note  that a sustained elevation of hsTnI can be caused by renal disease,  chronic heart failure, sepsis, pulmonary embolism and other clinical  conditions. ng/L (03.06.24 @ 09:11)        MEDICATIONS  (STANDING):  carbidopa/levodopa  25/100 1 Tablet(s) Oral <User Schedule>  enoxaparin Injectable 40 milliGRAM(s) SubCutaneous every 24 hours  lidocaine   4% Patch 1 Patch Transdermal daily  midodrine. 5 milliGRAM(s) Oral <User Schedule>  polyethylene glycol 3350 17 Gram(s) Oral daily  senna 2 Tablet(s) Oral at bedtime    MEDICATIONS  (PRN):  aluminum hydroxide/magnesium hydroxide/simethicone Suspension 30 milliLiter(s) Oral every 4 hours PRN Dyspepsia  traMADol 25 milliGRAM(s) Oral three times a day PRN Moderate Pain (4 - 6)

## 2024-03-06 NOTE — PROVIDER CONTACT NOTE (OTHER) - SITUATION
Physical therapist ambulated 10ft with patient in PT gym when pt had an episode of syncope with some body shaking. Pt experienced 2 episodes lasting 10 seconds and 5 seconds each.

## 2024-03-07 LAB
ALBUMIN SERPL ELPH-MCNC: 2.9 G/DL — LOW (ref 3.3–5)
ALP SERPL-CCNC: 61 U/L — SIGNIFICANT CHANGE UP (ref 40–120)
ALT FLD-CCNC: 37 U/L — SIGNIFICANT CHANGE UP (ref 10–45)
ANION GAP SERPL CALC-SCNC: 6 MMOL/L — SIGNIFICANT CHANGE UP (ref 5–17)
AST SERPL-CCNC: 31 U/L — SIGNIFICANT CHANGE UP (ref 10–40)
BASOPHILS # BLD AUTO: 0.07 K/UL — SIGNIFICANT CHANGE UP (ref 0–0.2)
BASOPHILS NFR BLD AUTO: 0.9 % — SIGNIFICANT CHANGE UP (ref 0–2)
BILIRUB SERPL-MCNC: 0.5 MG/DL — SIGNIFICANT CHANGE UP (ref 0.2–1.2)
BUN SERPL-MCNC: 25 MG/DL — HIGH (ref 7–23)
CALCIUM SERPL-MCNC: 9.1 MG/DL — SIGNIFICANT CHANGE UP (ref 8.4–10.5)
CHLORIDE SERPL-SCNC: 105 MMOL/L — SIGNIFICANT CHANGE UP (ref 96–108)
CO2 SERPL-SCNC: 28 MMOL/L — SIGNIFICANT CHANGE UP (ref 22–31)
CREAT SERPL-MCNC: 0.91 MG/DL — SIGNIFICANT CHANGE UP (ref 0.5–1.3)
EGFR: 86 ML/MIN/1.73M2 — SIGNIFICANT CHANGE UP
EOSINOPHIL # BLD AUTO: 0.43 K/UL — SIGNIFICANT CHANGE UP (ref 0–0.5)
EOSINOPHIL NFR BLD AUTO: 5.4 % — SIGNIFICANT CHANGE UP (ref 0–6)
GLUCOSE SERPL-MCNC: 94 MG/DL — SIGNIFICANT CHANGE UP (ref 70–99)
HCT VFR BLD CALC: 34.9 % — LOW (ref 39–50)
HGB BLD-MCNC: 11.4 G/DL — LOW (ref 13–17)
IMM GRANULOCYTES NFR BLD AUTO: 1.6 % — HIGH (ref 0–0.9)
LYMPHOCYTES # BLD AUTO: 1.07 K/UL — SIGNIFICANT CHANGE UP (ref 1–3.3)
LYMPHOCYTES # BLD AUTO: 13.5 % — SIGNIFICANT CHANGE UP (ref 13–44)
MCHC RBC-ENTMCNC: 28.4 PG — SIGNIFICANT CHANGE UP (ref 27–34)
MCHC RBC-ENTMCNC: 32.7 GM/DL — SIGNIFICANT CHANGE UP (ref 32–36)
MCV RBC AUTO: 87 FL — SIGNIFICANT CHANGE UP (ref 80–100)
MONOCYTES # BLD AUTO: 0.59 K/UL — SIGNIFICANT CHANGE UP (ref 0–0.9)
MONOCYTES NFR BLD AUTO: 7.5 % — SIGNIFICANT CHANGE UP (ref 2–14)
NEUTROPHILS # BLD AUTO: 5.61 K/UL — SIGNIFICANT CHANGE UP (ref 1.8–7.4)
NEUTROPHILS NFR BLD AUTO: 71.1 % — SIGNIFICANT CHANGE UP (ref 43–77)
NRBC # BLD: 0 /100 WBCS — SIGNIFICANT CHANGE UP (ref 0–0)
PLATELET # BLD AUTO: 409 K/UL — HIGH (ref 150–400)
POTASSIUM SERPL-MCNC: 4.1 MMOL/L — SIGNIFICANT CHANGE UP (ref 3.5–5.3)
POTASSIUM SERPL-SCNC: 4.1 MMOL/L — SIGNIFICANT CHANGE UP (ref 3.5–5.3)
PROT SERPL-MCNC: 6.9 G/DL — SIGNIFICANT CHANGE UP (ref 6–8.3)
RBC # BLD: 4.01 M/UL — LOW (ref 4.2–5.8)
RBC # FLD: 13.9 % — SIGNIFICANT CHANGE UP (ref 10.3–14.5)
SODIUM SERPL-SCNC: 139 MMOL/L — SIGNIFICANT CHANGE UP (ref 135–145)
WBC # BLD: 7.9 K/UL — SIGNIFICANT CHANGE UP (ref 3.8–10.5)
WBC # FLD AUTO: 7.9 K/UL — SIGNIFICANT CHANGE UP (ref 3.8–10.5)

## 2024-03-07 PROCEDURE — 99232 SBSQ HOSP IP/OBS MODERATE 35: CPT

## 2024-03-07 RX ADMIN — CARBIDOPA AND LEVODOPA 1 TABLET(S): 25; 100 TABLET ORAL at 20:11

## 2024-03-07 RX ADMIN — MIDODRINE HYDROCHLORIDE 5 MILLIGRAM(S): 2.5 TABLET ORAL at 06:07

## 2024-03-07 RX ADMIN — CARBIDOPA AND LEVODOPA 1 TABLET(S): 25; 100 TABLET ORAL at 12:31

## 2024-03-07 RX ADMIN — CARBIDOPA AND LEVODOPA 1 TABLET(S): 25; 100 TABLET ORAL at 08:10

## 2024-03-07 RX ADMIN — CARBIDOPA AND LEVODOPA 1 TABLET(S): 25; 100 TABLET ORAL at 15:52

## 2024-03-07 RX ADMIN — MIDODRINE HYDROCHLORIDE 5 MILLIGRAM(S): 2.5 TABLET ORAL at 12:31

## 2024-03-07 RX ADMIN — ENOXAPARIN SODIUM 40 MILLIGRAM(S): 100 INJECTION SUBCUTANEOUS at 06:07

## 2024-03-07 NOTE — PROGRESS NOTE ADULT - SUBJECTIVE AND OBJECTIVE BOX
78y old  male who presents with a chief complaint of Left hip replacement      Patient seen and examined. No acute events overnight.   s/P RRT this morning in the PT gym 3/6/24 due to orthostatic syncopy   no n/v, no sob, no headaches or dizziness.      Vital Signs Last 24 Hrs  T(C): 37.1 (07 Mar 2024 08:15), Max: 37.1 (07 Mar 2024 08:15)  T(F): 98.7 (07 Mar 2024 08:15), Max: 98.7 (07 Mar 2024 08:15)  HR: 68 (07 Mar 2024 08:15) (68 - 75)  BP: 100/55 (07 Mar 2024 08:15) (100/55 - 161/77)  BP(mean): --  RR: 16 (07 Mar 2024 08:15) (16 - 16)  SpO2: 97% (07 Mar 2024 08:15) (96% - 97%)    Parameters below as of 07 Mar 2024 08:15  Patient On (Oxygen Delivery Method): room air      GENERAL- NAD  EAR/NOSE/MOUTH/THROAT - MMM  EYES- CAN, conjunctiva and Sclera clear  NECK- supple  RESPIRATORY-  clear to auscultation bilaterally, non laboured breathing  CARDIOVASCULAR - SIS2, RRR  GI - soft NT BS present  EXTREMITIES- no pedal edema  NEUROLOGY- no gross focal deficits  PSYCHIATRY- AAO X 3                    11.4                 139  | 28   | 25           7.90  >-----------< 409     ------------------------< 94                    34.9                 4.1  | 105  | 0.91                                         Ca 9.1   Mg x     Ph x      Urinalysis + Microscopic Examination (03.06.24 @ 12:25)    pH Urine: 6.0   Urine Appearance: Clear   Color: Yellow   Specific Gravity: 1.005   Protein, Urine: Negative mg/dL   Glucose Qualitative, Urine: Negative mg/dL   Ketone - Urine: Negative mg/dL   Blood, Urine: Negative   Bilirubin: Negative   Urobilinogen: 0.2 mg/dL   Leukocyte Esterase Concentration: Negative   Nitrite: Negative   White Blood Cell - Urine: 0 /HPF   Red Blood Cell - Urine: 0 /HPF   Bacteria: Negative /HPF   Squamous Epithelial Cells: 0      MEDICATIONS  (STANDING):  carbidopa/levodopa  25/100 1 Tablet(s) Oral <User Schedule>  enoxaparin Injectable 40 milliGRAM(s) SubCutaneous every 24 hours  lidocaine   4% Patch 1 Patch Transdermal daily  midodrine. 5 milliGRAM(s) Oral <User Schedule>  polyethylene glycol 3350 17 Gram(s) Oral daily  senna 2 Tablet(s) Oral at bedtime    MEDICATIONS  (PRN):  aluminum hydroxide/magnesium hydroxide/simethicone Suspension 30 milliLiter(s) Oral every 4 hours PRN Dyspepsia  traMADol 25 milliGRAM(s) Oral three times a day PRN Moderate Pain (4 - 6)

## 2024-03-07 NOTE — PROGRESS NOTE ADULT - ASSESSMENT
ASSESSMENT/PLAN  77 year old male of Parkinson's disease since 2022 and follows with Dr. Ortega, prior colon CA w/ ostomy, prior prostate CA s/p radiation cystitis, blurry vision 2/2 cataracts baseline, who presented to ED at [facility] on 2/27 s/p mechanical fall at home. Per wife patient ambulated without devise in the house, and has had multiple falls due to poor balance from Parkinson's but usually falls on his buttocks. He was found to have a left hip fracture and underwent Left hip replacement with Dr. Diaz. Post operatively he had orthostatic hypotension which self resolved. His hospital course was uneventful and he was evaluated by PT and OT who recommended acute rehab. He was admitted to [facility] on 3/1/24.   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    #Left hip fracture s/p hip replacement now with gait Instability, ADL impairments and Functional impairments  - Continue Comprehensive Rehab Program of PT/OT/SLP  -Continue Lovenox for DVT prophylaxis and then switch to aspirin 81mg BID for total of 6 weeks  -Continue total hip precautions with abduction pillow in bed   -Continue weight bearing as tolerated    ------------------------------------------------------  #Parkinson's related motor symptoms bradykinesia, falls, rigidity, tremors  -Continue Sinemet 25/100mg 1 tab at 8am, 12pm, 4pm and 8pm  -Movement disorders following    --------------------------------------------------------  Parkinson's related non-motor symptoms    #Orthostatic hypotension  -Convulsive Syncopal event on 3/6 -self resolved when lying flat   - Completed 500 cc bolus  - basic labs, mag, Phosp, troponin EKG, UA negative for infectious process   -Continue midodrine to 5mg BID- 3/6    #Pain control  - Tylenol PRN  - Tramadol PRN  - Lidocaine patch    #GI/Bowel Management/Constipation  - Continue Senna at bedtime   - Miralax daily 3/4  - Last BM 3/4    #Bladder management  - Continue to monitor PVR q 8 hours (SC if > 400)  -Monitor UO    ----------------------------------------------------------  Concurrent medical problems    #DVT Prophylaxis  - Lovenox  - TEDs     #Skin:  - Left hip incision with aquacel dressing    FEN   - Diet - Regular with thins  - Dysphagia  SLP - evaluation and treatment    Precautions / PROPHYLAXIS:   - Falls  - ortho: Weight bearing status: WBAT with hip precautions   - Lungs: Incentive Spirometer   - Pressure injury/Skin: OOB to Chair, PT/OT        Follow up     Sonido Alvarez  [facility]  [address] [place] [address]  Phone: [phone]  Fax: [phone]  Follow Up Time:     Adonis Diaz  Orthopaedic Surgery  [address] [place] [address], [address]  [place] [address]  Phone: [phone]  Fax: [phone]

## 2024-03-07 NOTE — PROGRESS NOTE ADULT - SUBJECTIVE AND OBJECTIVE BOX
SUBJECTIVE/ROS: Patient evaluated while in bed. He has been exhibiting episodes of RBD and discussed starting melatonin vs. clonazepam with patient/family and refused for now. Re-education provided. No further episodes of syncopal or OH this morning but will monitor vital and adjust medications as warranted. He otherwise denies headache, abdominal pain, dizziness this morning.       HPI:  77 year old male of Parkinson's disease since 2022 and follows with Dr. Ortega, prior colon CA w/ ostomy, prior prostate CA s/p radiation cystitis, blurry vision 2/2 cataracts baseline, who presented to ED at [facility] on 2/27 s/p mechanical fall at home. Per wife patient ambulated without devise in the house, and has had multiple falls due to poor balance from Parkinson's, but usually falls on his buttocks. He was found to have a left hip fracture and underwent Left hip replacement with Dr. Diaz. Post operatively he had orthostatic hypotension which self resolved. His hospital course was uneventful and he was evaluated by PT and OT who recommended acute rehab. He was admitted to [facility] on 3/1/24.      Vital Signs Last 24 Hrs  T(C): 37.1 (07 Mar 2024 08:15), Max: 37.1 (07 Mar 2024 08:15)  T(F): 98.7 (07 Mar 2024 08:15), Max: 98.7 (07 Mar 2024 08:15)  HR: 68 (07 Mar 2024 08:15) (68 - 75)  BP: 100/55 (07 Mar 2024 08:15) (100/55 - 161/77)  BP(mean): --  RR: 16 (07 Mar 2024 08:15) (16 - 16)  SpO2: 97% (07 Mar 2024 08:15) (96% - 97%)    Parameters below as of 07 Mar 2024 08:15  Patient On (Oxygen Delivery Method): room air        PHYSICAL EXAM  Constitutional - NAD, Comfortable  HEENT - NCAT, EOMI   Chest - Breathing comfortably  Extremities-no edema   Neurologic Exam -   awake, alert   restricted vertical gaze  severe hypophonia with dysarthria   mild rest tremor noted   bradykinesia worse on rt> left        LABS:                          11.4   7.90  )-----------( 409      ( 07 Mar 2024 05:42 )             34.9     03-07    139  |  105  |  25<H>  ----------------------------<  94  4.1   |  28  |  0.91    Ca    9.1      07 Mar 2024 05:42  Phos  4.0     03-06  Mg     2.1     03-06    TPro  6.9  /  Alb  2.9<L>  /  TBili  0.5  /  DBili  x   /  AST  31  /  ALT  37  /  AlkPhos  61  03-07    LIVER FUNCTIONS - ( 07 Mar 2024 05:42 )  Alb: 2.9 g/dL / Pro: 6.9 g/dL / ALK PHOS: 61 U/L / ALT: 37 U/L / AST: 31 U/L / GGT: x               MEDICATIONS  (STANDING):  carbidopa/levodopa  25/100 1 Tablet(s) Oral <User Schedule>  enoxaparin Injectable 40 milliGRAM(s) SubCutaneous every 24 hours  lidocaine   4% Patch 1 Patch Transdermal daily  midodrine. 5 milliGRAM(s) Oral <User Schedule>  polyethylene glycol 3350 17 Gram(s) Oral daily  senna 2 Tablet(s) Oral at bedtime    MEDICATIONS  (PRN):  aluminum hydroxide/magnesium hydroxide/simethicone Suspension 30 milliLiter(s) Oral every 4 hours PRN Dyspepsia  traMADol 25 milliGRAM(s) Oral three times a day PRN Moderate Pain (4 - 6)

## 2024-03-07 NOTE — PROGRESS NOTE ADULT - ASSESSMENT
78 y/o man of Parkinson's disease since 2022 and follows with Dr. Ortega, prior colon CA w/ ostomy, prior prostate CA s/p radiation cystitis, blurry vision 2/2 cataracts baseline, who presented to ED at [facility] on 2/27 s/p mechanical fall at home. Per wife patient ambulated without devise in the house, and has had multiple falls due to poor balance from Parkinson's but usually falls on his buttocks. He was found to have a left hip fracture and underwent Left hip replacement with Dr. Diaz. Post operatively he had orthostatic hypotension which self resolved. His hospital course was uneventful and he was evaluated by PT and OT who recommended acute rehab. He was admitted to [facility] on 3/1/24- pt/ot/dvt ppx     s/p fall, Left hip fracture s/p hip replacement   -pt/ot/dvt ppx  -Continue Lovenox for DVT prophylaxis and then switch to aspirin 81mg BID for total of 6 weeks    s/p RRT 3/6/24 due to hypotensive syncope, BP- 90's  s/p iv bolus- BP improved   midodrine increased to 5  bid 3/6/24  labs and w/u-trop  ekg, cxr, ua- non impressive  monitor BP    Parkinson's  bradykinesia, falls, rigidity, tremors  -Continue Sinemet     Orthostatic hypotension likely related to PD   - midodrine    DVT Prophylaxis  - Lovenox    will follow  d/w rehab team

## 2024-03-08 LAB
APPEARANCE UR: ABNORMAL
APPEARANCE UR: ABNORMAL
BACTERIA # UR AUTO: ABNORMAL /HPF
BACTERIA # UR AUTO: ABNORMAL /HPF
BILIRUB UR-MCNC: NEGATIVE — SIGNIFICANT CHANGE UP
BILIRUB UR-MCNC: NEGATIVE — SIGNIFICANT CHANGE UP
BLD GP AB SCN SERPL QL: SIGNIFICANT CHANGE UP
COLOR SPEC: YELLOW — SIGNIFICANT CHANGE UP
COLOR SPEC: YELLOW — SIGNIFICANT CHANGE UP
DIFF PNL FLD: ABNORMAL
DIFF PNL FLD: ABNORMAL
EPI CELLS # UR: 0 — SIGNIFICANT CHANGE UP
EPI CELLS # UR: 2 — SIGNIFICANT CHANGE UP
GLUCOSE UR QL: NEGATIVE MG/DL — SIGNIFICANT CHANGE UP
GLUCOSE UR QL: NEGATIVE MG/DL — SIGNIFICANT CHANGE UP
HCT VFR BLD CALC: 35.7 % — LOW (ref 39–50)
HGB BLD-MCNC: 11.4 G/DL — LOW (ref 13–17)
HYALINE CASTS # UR AUTO: PRESENT
KETONES UR-MCNC: NEGATIVE MG/DL — SIGNIFICANT CHANGE UP
KETONES UR-MCNC: NEGATIVE MG/DL — SIGNIFICANT CHANGE UP
LEUKOCYTE ESTERASE UR-ACNC: ABNORMAL
LEUKOCYTE ESTERASE UR-ACNC: ABNORMAL
MCHC RBC-ENTMCNC: 27.9 PG — SIGNIFICANT CHANGE UP (ref 27–34)
MCHC RBC-ENTMCNC: 31.9 GM/DL — LOW (ref 32–36)
MCV RBC AUTO: 87.3 FL — SIGNIFICANT CHANGE UP (ref 80–100)
NITRITE UR-MCNC: NEGATIVE — SIGNIFICANT CHANGE UP
NITRITE UR-MCNC: POSITIVE
NRBC # BLD: 0 /100 WBCS — SIGNIFICANT CHANGE UP (ref 0–0)
PH UR: 6 — SIGNIFICANT CHANGE UP (ref 5–8)
PH UR: 7.5 — SIGNIFICANT CHANGE UP (ref 5–8)
PLATELET # BLD AUTO: 429 K/UL — HIGH (ref 150–400)
PROT UR-MCNC: 100 MG/DL
PROT UR-MCNC: 300 MG/DL
RBC # BLD: 4.09 M/UL — LOW (ref 4.2–5.8)
RBC # FLD: 13.8 % — SIGNIFICANT CHANGE UP (ref 10.3–14.5)
RBC CASTS # UR COMP ASSIST: 0 /HPF — SIGNIFICANT CHANGE UP (ref 0–4)
RBC CASTS # UR COMP ASSIST: 40 /HPF — HIGH (ref 0–4)
SP GR SPEC: 1.01 — SIGNIFICANT CHANGE UP (ref 1–1.03)
SP GR SPEC: 1.02 — SIGNIFICANT CHANGE UP (ref 1–1.03)
UROBILINOGEN FLD QL: 0.2 MG/DL — SIGNIFICANT CHANGE UP (ref 0.2–1)
UROBILINOGEN FLD QL: 0.2 MG/DL — SIGNIFICANT CHANGE UP (ref 0.2–1)
WBC # BLD: 9.39 K/UL — SIGNIFICANT CHANGE UP (ref 3.8–10.5)
WBC # FLD AUTO: 9.39 K/UL — SIGNIFICANT CHANGE UP (ref 3.8–10.5)
WBC UR QL: 4 /HPF — SIGNIFICANT CHANGE UP (ref 0–5)
WBC UR QL: 8 /HPF — HIGH (ref 0–5)

## 2024-03-08 PROCEDURE — 99232 SBSQ HOSP IP/OBS MODERATE 35: CPT

## 2024-03-08 RX ORDER — PHENAZOPYRIDINE HCL 100 MG
100 TABLET ORAL EVERY 8 HOURS
Refills: 0 | Status: COMPLETED | OUTPATIENT
Start: 2024-03-08 | End: 2024-03-10

## 2024-03-08 RX ORDER — LACTOBACILLUS ACIDOPHILUS 100MM CELL
1 CAPSULE ORAL
Refills: 0 | Status: DISCONTINUED | OUTPATIENT
Start: 2024-03-08 | End: 2024-03-15

## 2024-03-08 RX ORDER — BACITRACIN ZINC 500 UNIT/G
1 OINTMENT IN PACKET (EA) TOPICAL ONCE
Refills: 0 | Status: DISCONTINUED | OUTPATIENT
Start: 2024-03-08 | End: 2024-03-08

## 2024-03-08 RX ORDER — ZINC OXIDE 200 MG/G
1 OINTMENT TOPICAL
Refills: 0 | Status: DISCONTINUED | OUTPATIENT
Start: 2024-03-08 | End: 2024-03-15

## 2024-03-08 RX ORDER — ACETAMINOPHEN 500 MG
650 TABLET ORAL EVERY 6 HOURS
Refills: 0 | Status: DISCONTINUED | OUTPATIENT
Start: 2024-03-08 | End: 2024-03-15

## 2024-03-08 RX ADMIN — CARBIDOPA AND LEVODOPA 1 TABLET(S): 25; 100 TABLET ORAL at 12:11

## 2024-03-08 RX ADMIN — Medication 1 TABLET(S): at 18:18

## 2024-03-08 RX ADMIN — Medication 100 MILLIGRAM(S): at 22:13

## 2024-03-08 RX ADMIN — CARBIDOPA AND LEVODOPA 1 TABLET(S): 25; 100 TABLET ORAL at 08:12

## 2024-03-08 RX ADMIN — Medication 100 MILLIGRAM(S): at 15:58

## 2024-03-08 RX ADMIN — CARBIDOPA AND LEVODOPA 1 TABLET(S): 25; 100 TABLET ORAL at 15:58

## 2024-03-08 RX ADMIN — MIDODRINE HYDROCHLORIDE 5 MILLIGRAM(S): 2.5 TABLET ORAL at 05:37

## 2024-03-08 RX ADMIN — Medication 650 MILLIGRAM(S): at 05:37

## 2024-03-08 RX ADMIN — SENNA PLUS 2 TABLET(S): 8.6 TABLET ORAL at 22:14

## 2024-03-08 RX ADMIN — CARBIDOPA AND LEVODOPA 1 TABLET(S): 25; 100 TABLET ORAL at 20:42

## 2024-03-08 RX ADMIN — MIDODRINE HYDROCHLORIDE 5 MILLIGRAM(S): 2.5 TABLET ORAL at 12:11

## 2024-03-08 RX ADMIN — ZINC OXIDE 1 APPLICATION(S): 200 OINTMENT TOPICAL at 18:38

## 2024-03-08 RX ADMIN — ENOXAPARIN SODIUM 40 MILLIGRAM(S): 100 INJECTION SUBCUTANEOUS at 08:58

## 2024-03-08 NOTE — CHART NOTE - NSCHARTNOTEFT_GEN_A_CORE
F/U Note:    78y old  male who presents with a chief complaint of Left hip replacement     Interval Hx;    Notified by RN that patient is complaining of dysuria. Sent in UA. UA with large amount of blood and RBCs in urine. Per patient's wife, patient has hx of recurrent radiation cystitis s/p radiation therapy for prostate cancer. Per nurse patient's urine did not have any clots or was not blood tinged. Patient denies any abdominal pain, chills, fevers.     Vital Signs Last 24 Hrs  T(C): 36.4 (07 Mar 2024 20:11), Max: 37.1 (07 Mar 2024 08:15)  T(F): 97.5 (07 Mar 2024 20:11), Max: 98.7 (07 Mar 2024 08:15)  HR: 69 (07 Mar 2024 20:11) (68 - 70)  BP: 122/63 (07 Mar 2024 20:11) (100/55 - 122/63)  BP(mean): --  RR: 16 (07 Mar 2024 20:11) (16 - 16)  SpO2: 96% (07 Mar 2024 20:11) (96% - 97%)    Parameters below as of 07 Mar 2024 20:11  Patient On (Oxygen Delivery Method): room air                                11.4   7.90  )-----------( 409      ( 07 Mar 2024 05:42 )             34.9         03-07    139  |  105  |  25<H>  ----------------------------<  94  4.1   |  28  |  0.91    Ca    9.1      07 Mar 2024 05:42  Phos  4.0     03-06  Mg     2.1     03-06    TPro  6.9  /  Alb  2.9<L>  /  TBili  0.5  /  DBili  x   /  AST  31  /  ALT  37  /  AlkPhos  61  03-07        ROS: +dysuria    Physical Exam:  T(C): 36.4 (03-07-24 @ 20:11), Max: 37.1 (03-07-24 @ 08:15)  HR: 69 (03-07-24 @ 20:11) (68 - 70)  BP: 122/63 (03-07-24 @ 20:11) (100/55 - 122/63)  RR: 16 (03-07-24 @ 20:11) (16 - 16)  SpO2: 96% (03-07-24 @ 20:11) (96% - 97%)    CONSTITUTIONAL: Well groomed, no apparent distress  EYES: PERRLA and symmetric, EOMI, No conjunctival or scleral injection, non-icteric  ENMT: Oral mucosa with moist membranes. Normal dentition; no pharyngeal injection or exudates             NECK: Supple, symmetric and without tracheal deviation   RESP: No respiratory distress, no use of accessory muscles; CTA b/l, no WRR  CV: RRR, +S1S2, no MRG; no JVD; no peripheral edema  GI: Soft, NT, ND, no rebound, no guarding; no palpable masses; no hepatosplenomegaly; no hernia palpated  LYMPH: No cervical LAD or tenderness; no axillary LAD or tenderness; no inguinal LAD or tenderness  MSK: Normal gait; No digital clubbing or cyanosis; examination of the (head/neck/spine/ribs/pelvis, RUE, LUE, RLE, LLE) without misalignment,            Normal ROM without pain, no spinal tenderness, normal muscle strength/tone  SKIN: No rashes or ulcers noted; no subcutaneous nodules or induration palpable  NEURO: CN II-XII intact; normal reflexes in upper and lower extremities, sensation intact in upper and lower extremities b/l to light touch   PSYCH: Appropriate insight/judgment; A+O x 3, mood and affect appropriate, recent/remote memory intact      Plan:   -will order cbc and type and screen given blood in urine prior to giving lovenox  -will s/o to [facility] F/U Note:    78y old  male who presents with a chief complaint of Left hip replacement     Interval Hx;    Notified by RN that patient is complaining of dysuria. Sent in UA. UA with large amount of blood and RBCs in urine. Per patient's wife, patient has hx of recurrent radiation cystitis s/p radiation therapy for prostate cancer. Per nurse patient's urine did not have any clots or was not blood tinged. Patient denies any abdominal pain, chills, fevers.     Also per RN after removing condom cath, patient has 3 skin tears on shaft.     Vital Signs Last 24 Hrs  T(C): 36.4 (07 Mar 2024 20:11), Max: 37.1 (07 Mar 2024 08:15)  T(F): 97.5 (07 Mar 2024 20:11), Max: 98.7 (07 Mar 2024 08:15)  HR: 69 (07 Mar 2024 20:11) (68 - 70)  BP: 122/63 (07 Mar 2024 20:11) (100/55 - 122/63)  BP(mean): --  RR: 16 (07 Mar 2024 20:11) (16 - 16)  SpO2: 96% (07 Mar 2024 20:11) (96% - 97%)    Parameters below as of 07 Mar 2024 20:11  Patient On (Oxygen Delivery Method): room air                                11.4   7.90  )-----------( 409      ( 07 Mar 2024 05:42 )             34.9         03-07    139  |  105  |  25<H>  ----------------------------<  94  4.1   |  28  |  0.91    Ca    9.1      07 Mar 2024 05:42  Phos  4.0     03-06  Mg     2.1     03-06    TPro  6.9  /  Alb  2.9<L>  /  TBili  0.5  /  DBili  x   /  AST  31  /  ALT  37  /  AlkPhos  61  03-07    ROS: +dysuria    Physical Exam:  T(C): 36.4 (03-07-24 @ 20:11), Max: 37.1 (03-07-24 @ 08:15)  HR: 69 (03-07-24 @ 20:11) (68 - 70)  BP: 122/63 (03-07-24 @ 20:11) (100/55 - 122/63)  RR: 16 (03-07-24 @ 20:11) (16 - 16)  SpO2: 96% (03-07-24 @ 20:11) (96% - 97%)        Plan:   -will order cbc and type and screen given blood in urine prior to giving lovenox  -bacitracin for penile skin tears  -will s/o to dayteam

## 2024-03-08 NOTE — PROGRESS NOTE ADULT - ASSESSMENT
ASSESSMENT/PLAN  77 year old male of Parkinson's disease since 2022 and follows with Dr. Ortega, prior colon CA w/ ostomy, prior prostate CA s/p radiation cystitis, blurry vision 2/2 cataracts baseline, who presented to ED at [facility] on 2/27 s/p mechanical fall at home. Per wife patient ambulated without devise in the house, and has had multiple falls due to poor balance from Parkinson's but usually falls on his buttocks. He was found to have a left hip fracture and underwent Left hip replacement with Dr. Diaz. Post operatively he had orthostatic hypotension which self resolved. His hospital course was uneventful and he was evaluated by PT and OT who recommended acute rehab. He was admitted to [facility] on 3/1/24.   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    #Left hip fracture s/p hip replacement now with gait Instability, ADL impairments and Functional impairments  - Continue Comprehensive Rehab Program of PT/OT/SLP  -Continue Lovenox for DVT prophylaxis and then switch to aspirin 81mg BID for total of 6 weeks  -Continue total hip precautions with abduction pillow in bed   -Continue weight bearing as tolerated    ------------------------------------------------------  #Parkinson's related motor symptoms bradykinesia, falls, rigidity, tremors  -Continue Sinemet 25/100mg 1 tab at 8am, 12pm, 4pm and 8pm  -Movement disorders following    --------------------------------------------------------  Parkinson's related non-motor symptoms    #Orthostatic hypotension  -Convulsive Syncopal event on 3/6 -self resolved when lying flat   - Completed 500 cc bolus  - basic labs, mag, Phosp, troponin EKG, UA negative for infectious process (signs of UTI as of 3/8, sent new UA and culture, started antibiotic)  -Continue midodrine to 5mg BID- 3/6    #Pain control  - Tylenol PRN  - Tramadol PRN  - Lidocaine patch    #GI/Bowel Management/Constipation  - Continue Senna at bedtime   - Miralax daily 3/4  - Last BM 3/7    #Bladder management  - Continue to monitor PVR q 8 hours (SC if > 400)  - Monitor UO    #RBD  - Consider starting melatonin if bothersome (caregiver declined for now)    ----------------------------------------------------------  Concurrent medical problems    #DVT Prophylaxis  - Lovenox  - TEDs     #Skin:  - Left hip incision with aquacel dressing    FEN   - Diet - Regular with thins  - Dysphagia  SLP - evaluation and treatment    Precautions / PROPHYLAXIS:   - Falls  - ortho: Weight bearing status: WBAT with hip precautions   - Lungs: Incentive Spirometer   - Pressure injury/Skin: OOB to Chair, PT/OT        Follow up     Sonido Alvarez  [facility]  [address] [place] [id number]  Phone: [phone]  Fax: [phone]  Follow Up Time:     Adonis Diaz  Orthopaedic Surgery  [address] [place] [address], [address]  [place] [address]  Phone: [phone]  Fax: [phone]

## 2024-03-08 NOTE — PROGRESS NOTE ADULT - SUBJECTIVE AND OBJECTIVE BOX
SUBJECTIVE/ROS: Patient evaluated while in bed. Reported pain overnight with urination, UA borderline for suspect UTI, will get second sample for UA and culture, starting Bactrim and Pyridium (education provided about urine discoloration). Objectively no lesions at meatus, linear redness on scrotum likely resulting from condom catheter decubitus. Discontinued use of condom catheter, will treat with topic zinc paste, no signs of skin infection so topical bacitratin discontinued. No more episodes of syncope, still hypotensive this morning on sitting (160/82 mmHg 71 bpm to 92/55 mmHg 73 bpm), on midodrine 5 mg BID since 3/6, will keep monitoring vitals. He otherwise denies headache, abdominal pain. Dressing on left hip incision in good condition. Levodopa increased to 4 daily dosing yesterday, well tolerated.     HPI:  77 year old male of Parkinson's disease since 2022 and follows with Dr. Ortega, prior colon CA w/ ostomy, prior prostate CA s/p radiation cystitis, blurry vision 2/2 cataracts baseline, who presented to ED at [facility] on 2/27 s/p mechanical fall at home. Per wife patient ambulated without devise in the house, and has had multiple falls due to poor balance from Parkinson's, but usually falls on his buttocks. He was found to have a left hip fracture and underwent Left hip replacement with Dr. Diaz. Post operatively he had orthostatic hypotension which self resolved. His hospital course was uneventful and he was evaluated by PT and OT who recommended acute rehab. He was admitted to [facility] on 3/1/24.      Vital Signs Last 24 Hrs  T(C): 36.8 (08 Mar 2024 09:15), Max: 36.8 (08 Mar 2024 09:15)  T(F): 98.3 (08 Mar 2024 09:15), Max: 98.3 (08 Mar 2024 09:15)  HR: 73 (08 Mar 2024 09:15) (69 - 73)  BP: 109/71 (08 Mar 2024 09:15) (100/66 - 122/63)  BP(mean): --  RR: 16 (08 Mar 2024 09:15) (16 - 16)  SpO2: 94% (08 Mar 2024 09:15) (94% - 97%)    Parameters below as of 07 Mar 2024 20:11  Patient On (Oxygen Delivery Method): room air    PHYSICAL EXAM  Constitutional - NAD, Comfortable  HEENT - NCAT  Chest - Breathing comfortably in room air   Extremities - no edema   Neurologic Exam -   awake, alert, disoriented in time, oriented on situation. Restricted vertical upward gaze. Hypomimia with staring expression. Severe hypokinetic dysarthria with festination and poor intelligibility. Moderate rigidity with trochlea. No rest tremor, mild to moderate kinetic > postural tremor worse on the right. Postural-kinetic tremor at LEs as well, worse on the right. Moderate bradykinesia worse on the right. Gait deferred. No sensory deficits. No perseveration on testing. No overt apraxia.     LABS:                        11.4   9.39  )-----------( 429      ( 08 Mar 2024 05:24 )             35.7     03-07    139  |  105  |  25<H>  ----------------------------<  94  4.1   |  28  |  0.91    Ca    9.1      07 Mar 2024 05:42    TPro  6.9  /  Alb  2.9<L>  /  TBili  0.5  /  DBili  x   /  AST  31  /  ALT  37  /  AlkPhos  61  03-07    LIVER FUNCTIONS - ( 07 Mar 2024 05:42 )  Alb: 2.9 g/dL / Pro: 6.9 g/dL / ALK PHOS: 61 U/L / ALT: 37 U/L / AST: 31 U/L / GGT: x           MEDICATIONS  (STANDING):  carbidopa/levodopa  25/100 1 Tablet(s) Oral <User Schedule>  enoxaparin Injectable 40 milliGRAM(s) SubCutaneous every 24 hours  lactobacillus acidophilus 1 Tablet(s) Oral two times a day with meals  lidocaine   4% Patch 1 Patch Transdermal daily  midodrine. 5 milliGRAM(s) Oral <User Schedule>  phenazopyridine 100 milliGRAM(s) Oral every 8 hours  polyethylene glycol 3350 17 Gram(s) Oral daily  senna 2 Tablet(s) Oral at bedtime  trimethoprim  160 mG/sulfamethoxazole 800 mG 1 Tablet(s) Oral two times a day    MEDICATIONS  (PRN):  acetaminophen     Tablet .. 650 milliGRAM(s) Oral every 6 hours PRN Mild Pain (1 - 3)  aluminum hydroxide/magnesium hydroxide/simethicone Suspension 30 milliLiter(s) Oral every 4 hours PRN Dyspepsia  traMADol 25 milliGRAM(s) Oral three times a day PRN Moderate Pain (4 - 6)

## 2024-03-09 PROCEDURE — 99232 SBSQ HOSP IP/OBS MODERATE 35: CPT

## 2024-03-09 RX ADMIN — Medication 1 TABLET(S): at 05:19

## 2024-03-09 RX ADMIN — CARBIDOPA AND LEVODOPA 1 TABLET(S): 25; 100 TABLET ORAL at 12:19

## 2024-03-09 RX ADMIN — Medication 100 MILLIGRAM(S): at 13:42

## 2024-03-09 RX ADMIN — LIDOCAINE 1 PATCH: 4 CREAM TOPICAL at 12:27

## 2024-03-09 RX ADMIN — ZINC OXIDE 1 APPLICATION(S): 200 OINTMENT TOPICAL at 18:56

## 2024-03-09 RX ADMIN — POLYETHYLENE GLYCOL 3350 17 GRAM(S): 17 POWDER, FOR SOLUTION ORAL at 12:27

## 2024-03-09 RX ADMIN — CARBIDOPA AND LEVODOPA 1 TABLET(S): 25; 100 TABLET ORAL at 16:14

## 2024-03-09 RX ADMIN — Medication 1 TABLET(S): at 08:01

## 2024-03-09 RX ADMIN — Medication 1 TABLET(S): at 18:55

## 2024-03-09 RX ADMIN — ENOXAPARIN SODIUM 40 MILLIGRAM(S): 100 INJECTION SUBCUTANEOUS at 05:20

## 2024-03-09 RX ADMIN — CARBIDOPA AND LEVODOPA 1 TABLET(S): 25; 100 TABLET ORAL at 20:14

## 2024-03-09 RX ADMIN — MIDODRINE HYDROCHLORIDE 5 MILLIGRAM(S): 2.5 TABLET ORAL at 12:19

## 2024-03-09 RX ADMIN — Medication 100 MILLIGRAM(S): at 20:14

## 2024-03-09 RX ADMIN — ZINC OXIDE 1 APPLICATION(S): 200 OINTMENT TOPICAL at 08:01

## 2024-03-09 RX ADMIN — MIDODRINE HYDROCHLORIDE 5 MILLIGRAM(S): 2.5 TABLET ORAL at 05:19

## 2024-03-09 RX ADMIN — Medication 100 MILLIGRAM(S): at 05:19

## 2024-03-09 RX ADMIN — CARBIDOPA AND LEVODOPA 1 TABLET(S): 25; 100 TABLET ORAL at 08:01

## 2024-03-09 NOTE — PROGRESS NOTE ADULT - SUBJECTIVE AND OBJECTIVE BOX
Patient is a 78y old  Male who presents with a chief complaint of Left hip replacement (08 Mar 2024 10:24)      Patient seen and examined at bedside.    ALLERGIES:  No Known Allergies    MEDICATIONS  (STANDING):  carbidopa/levodopa  25/100 1 Tablet(s) Oral <User Schedule>  enoxaparin Injectable 40 milliGRAM(s) SubCutaneous every 24 hours  lactobacillus acidophilus 1 Tablet(s) Oral two times a day with meals  lidocaine   4% Patch 1 Patch Transdermal daily  midodrine. 5 milliGRAM(s) Oral <User Schedule>  phenazopyridine 100 milliGRAM(s) Oral every 8 hours  polyethylene glycol 3350 17 Gram(s) Oral daily  senna 2 Tablet(s) Oral at bedtime  trimethoprim  160 mG/sulfamethoxazole 800 mG 1 Tablet(s) Oral two times a day  zinc oxide 20% Ointment 1 Application(s) Topical two times a day    MEDICATIONS  (PRN):  acetaminophen     Tablet .. 650 milliGRAM(s) Oral every 6 hours PRN Mild Pain (1 - 3)  aluminum hydroxide/magnesium hydroxide/simethicone Suspension 30 milliLiter(s) Oral every 4 hours PRN Dyspepsia    Vital Signs Last 24 Hrs  T(F): 97.4 (09 Mar 2024 07:54), Max: 98.5 (08 Mar 2024 20:39)  HR: 68 (09 Mar 2024 07:54) (68 - 76)  BP: 131/61 (09 Mar 2024 07:54) (94/57 - 150/77)  RR: 16 (09 Mar 2024 07:54) (16 - 16)  SpO2: 95% (09 Mar 2024 07:54) (95% - 97%)  I&O's Summary      PHYSICAL EXAM:  General: NAD, A/O x 3  ENT: MMM, no scleral icterus  Neck: Supple, No JVD, no thyroidomegaly  Lungs: Clear to auscultation bilaterally, no wheezes, no rales, no rhonchi, good inspiratory effort  Cardio: RRR, S1/S2, No murmurs  Abdomen: Soft, Nontender, Nondistended; Bowel sounds present  Extremities: No calf tenderness, No pitting edema, no skin changes    LABS:                        11.4   9.39  )-----------( 429      ( 08 Mar 2024 05:24 )             35.7       03-07    139  |  105  |  25  ----------------------------<  94  4.1   |  28  |  0.91    Ca    9.1      07 Mar 2024 05:42    TPro  6.9  /  Alb  2.9  /  TBili  0.5  /  DBili  x   /  AST  31  /  ALT  37  /  AlkPhos  61  03-07    Urinalysis Basic - ( 08 Mar 2024 04:45 )    Color: Yellow / Appearance: Cloudy / S.016 / pH: x  Gluc: x / Ketone: Negative mg/dL  / Bili: Negative / Urobili: 0.2 mg/dL   Blood: x / Protein: 300 mg/dL / Nitrite: Positive   Leuk Esterase: Small / RBC: 0 /HPF / WBC 4 /HPF   Sq Epi: x / Non Sq Epi: x / Bacteria: Many /HPF

## 2024-03-09 NOTE — PROGRESS NOTE ADULT - ASSESSMENT
76 y/o man of Parkinson's disease since 2022 and follows with Dr. Ortega, prior colon CA w/ ostomy, prior prostate CA s/p radiation cystitis, blurry vision 2/2 cataracts baseline, who presented to ED at [facility] on 2/27 s/p mechanical fall at home. Per wife patient ambulated without devise in the house, and has had multiple falls due to poor balance from Parkinson's but usually falls on his buttocks. He was found to have a left hip fracture and underwent Left hip replacement with Dr. Diaz. Post operatively he had orthostatic hypotension which self resolved. His hospital course was uneventful and he was evaluated by PT and OT who recommended acute rehab. He was admitted to [facility] on 3/1/24- pt/ot/dvt ppx     s/p fall, Left hip fracture s/p hip replacement   -pt/ot/dvt ppx  -Continue Lovenox for DVT prophylaxis and then switch to aspirin 81mg BID for total of 6 weeks    s/p RRT 3/6/24 due to hypotensive syncope, BP- 90's  s/p iv bolus- BP improved   midodrine increased to 5  bid 3/6/24    Parkinson's  bradykinesia, falls, rigidity, tremors  -Continue Sinemet     Orthostatic hypotension likely related to PD   - midodrine    DVT Prophylaxis- Lovenox

## 2024-03-09 NOTE — PROGRESS NOTE ADULT - SUBJECTIVE AND OBJECTIVE BOX
Chief complaint:  no new complaints      Patient is a 78y old  Male who presents with a chief complaint of Left hip replacement (09 Mar 2024 09:59)      PAST MEDICAL & SURGICAL HISTORY:  Parkinsons disease      Cancer of prostate      Radiation proctitis      H/O colectomy      VITALS  Vital Signs Last 24 Hrs  T(C): 36.3 (09 Mar 2024 07:54), Max: 36.9 (08 Mar 2024 20:39)  T(F): 97.4 (09 Mar 2024 07:54), Max: 98.5 (08 Mar 2024 20:39)  HR: 68 (09 Mar 2024 07:54) (68 - 72)  BP: 131/61 (09 Mar 2024 07:54) (131/61 - 150/77)  BP(mean): --  RR: 16 (09 Mar 2024 12:10) (16 - 16)  SpO2: 95% (09 Mar 2024 12:10) (95% - 97%)    Parameters below as of 09 Mar 2024 12:10  Patient On (Oxygen Delivery Method): room air          PHYSICAL EXAM  Constitutional - NAD, Comfortable  HEENT - NCAT, EOMI  Neck - Supple, No limited ROM  Chest - CTA bilaterally  Cardiovascular - RRR, S1S2  Abdomen - BS+, Soft, NTND  Extremities - No C/C/E  Neurologic Exam -                    Cognitive - Awake, Alert     No new focal deficits                  RECENT LABS                        11.4   9.39  )-----------( 429      ( 08 Mar 2024 05:24 )             35.7             Urinalysis Basic - ( 08 Mar 2024 04:45 )    Color: Yellow / Appearance: Cloudy / S.016 / pH: x  Gluc: x / Ketone: Negative mg/dL  / Bili: Negative / Urobili: 0.2 mg/dL   Blood: x / Protein: 300 mg/dL / Nitrite: Positive   Leuk Esterase: Small / RBC: 0 /HPF / WBC 4 /HPF   Sq Epi: x / Non Sq Epi: x / Bacteria: Many /HPF          CURRENT MEDICATIONS    MEDICATIONS  (STANDING):  carbidopa/levodopa  25/100 1 Tablet(s) Oral <User Schedule>  enoxaparin Injectable 40 milliGRAM(s) SubCutaneous every 24 hours  lactobacillus acidophilus 1 Tablet(s) Oral two times a day with meals  lidocaine   4% Patch 1 Patch Transdermal daily  midodrine. 5 milliGRAM(s) Oral <User Schedule>  phenazopyridine 100 milliGRAM(s) Oral every 8 hours  polyethylene glycol 3350 17 Gram(s) Oral daily  senna 2 Tablet(s) Oral at bedtime  trimethoprim  160 mG/sulfamethoxazole 800 mG 1 Tablet(s) Oral two times a day  zinc oxide 20% Ointment 1 Application(s) Topical two times a day    MEDICATIONS  (PRN):  acetaminophen     Tablet .. 650 milliGRAM(s) Oral every 6 hours PRN Mild Pain (1 - 3)  aluminum hydroxide/magnesium hydroxide/simethicone Suspension 30 milliLiter(s) Oral every 4 hours PRN Dyspepsia    ASSESSMENT & PLAN             Management - Toilet Q2  Skin - Turn Q2  Pain - Tylenol PRN  DVT PPX - Lovenox       Continue comprehensive acute rehab program consisting of 3hrs/day of OT/PT and SLP.

## 2024-03-10 PROCEDURE — 99232 SBSQ HOSP IP/OBS MODERATE 35: CPT

## 2024-03-10 RX ADMIN — CARBIDOPA AND LEVODOPA 1 TABLET(S): 25; 100 TABLET ORAL at 08:07

## 2024-03-10 RX ADMIN — MIDODRINE HYDROCHLORIDE 5 MILLIGRAM(S): 2.5 TABLET ORAL at 12:22

## 2024-03-10 RX ADMIN — LIDOCAINE 1 PATCH: 4 CREAM TOPICAL at 04:21

## 2024-03-10 RX ADMIN — Medication 1 TABLET(S): at 06:09

## 2024-03-10 RX ADMIN — CARBIDOPA AND LEVODOPA 1 TABLET(S): 25; 100 TABLET ORAL at 20:30

## 2024-03-10 RX ADMIN — Medication 100 MILLIGRAM(S): at 06:08

## 2024-03-10 RX ADMIN — Medication 1 TABLET(S): at 17:54

## 2024-03-10 RX ADMIN — CARBIDOPA AND LEVODOPA 1 TABLET(S): 25; 100 TABLET ORAL at 16:03

## 2024-03-10 RX ADMIN — ZINC OXIDE 1 APPLICATION(S): 200 OINTMENT TOPICAL at 17:50

## 2024-03-10 RX ADMIN — CARBIDOPA AND LEVODOPA 1 TABLET(S): 25; 100 TABLET ORAL at 12:22

## 2024-03-10 RX ADMIN — MIDODRINE HYDROCHLORIDE 5 MILLIGRAM(S): 2.5 TABLET ORAL at 06:09

## 2024-03-10 RX ADMIN — Medication 1 TABLET(S): at 08:07

## 2024-03-10 NOTE — PROGRESS NOTE ADULT - ASSESSMENT
78 y/o man of Parkinson's disease since 2022 and follows with Dr. Ortega, prior colon CA w/ ostomy, prior prostate CA s/p radiation cystitis, blurry vision 2/2 cataracts baseline, who presented to ED at [facility] on 2/27 s/p mechanical fall at home. Per wife patient ambulated without devise in the house, and has had multiple falls due to poor balance from Parkinson's but usually falls on his buttocks. He was found to have a left hip fracture and underwent Left hip replacement with Dr. Diaz. Post operatively he had orthostatic hypotension which self resolved. His hospital course was uneventful and he was evaluated by PT and OT who recommended acute rehab. He was admitted to [facility] on 3/1/24- pt/ot/dvt ppx     s/p fall, Left hip fracture s/p hip replacement   -pt/ot/dvt ppx  -Continue Lovenox for DVT prophylaxis and then switch to aspirin 81mg BID for total of 6 weeks    s/p RRT 3/6/24 due to hypotensive syncope, BP- 90's  s/p iv bolus- BP improved   midodrine increased to 5  bid 3/6/24    Parkinson's  bradykinesia, falls, rigidity, tremors  -Continue Sinemet     Orthostatic hypotension likely related to PD   - midodrine 5mg 6am and 1pm    DVT Prophylaxis- Lovenox

## 2024-03-10 NOTE — PROGRESS NOTE ADULT - SUBJECTIVE AND OBJECTIVE BOX
Chief complaint: no new complaints     Patient is a 78y old  Male who presents with a chief complaint of Left hip replacement (10 Mar 2024 09:59)      PAST MEDICAL & SURGICAL HISTORY:  Parkinsons disease      Cancer of prostate      Radiation proctitis      H/O colectomy    VITALS  Vital Signs Last 24 Hrs  T(C): 36.7 (10 Mar 2024 08:06), Max: 36.8 (09 Mar 2024 19:53)  T(F): 98.1 (10 Mar 2024 08:06), Max: 98.3 (09 Mar 2024 19:53)  HR: 66 (10 Mar 2024 08:06) (66 - 69)  BP: 146/78 (10 Mar 2024 08:06) (117/69 - 146/78)  BP(mean): --  RR: 16 (10 Mar 2024 12:14) (16 - 16)  SpO2: 95% (10 Mar 2024 12:14) (95% - 95%)    Parameters below as of 10 Mar 2024 12:14  Patient On (Oxygen Delivery Method): room air          PHYSICAL EXAM  Constitutional - NAD, Comfortable  HEENT - NCAT, EOMI  Neck - Supple, No limited ROM  Chest - CTA bilaterally  Cardiovascular - RRR, S1S2  Abdomen - Soft, NTND  Extremities - No calf tenderness   Neurologic Exam -                    Cognitive - Awake, Alert     No new focal deficits                 CURRENT MEDICATIONS    MEDICATIONS  (STANDING):  carbidopa/levodopa  25/100 1 Tablet(s) Oral <User Schedule>  enoxaparin Injectable 40 milliGRAM(s) SubCutaneous every 24 hours  lactobacillus acidophilus 1 Tablet(s) Oral two times a day with meals  lidocaine   4% Patch 1 Patch Transdermal daily  midodrine. 5 milliGRAM(s) Oral <User Schedule>  polyethylene glycol 3350 17 Gram(s) Oral daily  senna 2 Tablet(s) Oral at bedtime  trimethoprim  160 mG/sulfamethoxazole 800 mG 1 Tablet(s) Oral two times a day  zinc oxide 20% Ointment 1 Application(s) Topical two times a day    MEDICATIONS  (PRN):  acetaminophen     Tablet .. 650 milliGRAM(s) Oral every 6 hours PRN Mild Pain (1 - 3)  aluminum hydroxide/magnesium hydroxide/simethicone Suspension 30 milliLiter(s) Oral every 4 hours PRN Dyspepsia    ASSESSMENT & PLAN          GI/Bowel Management    Management - Toilet Q2  Skin - Turn Q2  Pain - Tylenol PRN  DVT PPX - Lovenox      Continue comprehensive acute rehab program consisting of 3hrs/day of OT/PT and SLP.

## 2024-03-10 NOTE — PROGRESS NOTE ADULT - SUBJECTIVE AND OBJECTIVE BOX
Patient is a 78y old  Male who presents with a chief complaint of Left hip replacement (09 Mar 2024 13:26)      Patient seen and examined at bedside.    ALLERGIES:  No Known Allergies    MEDICATIONS  (STANDING):  carbidopa/levodopa  25/100 1 Tablet(s) Oral <User Schedule>  enoxaparin Injectable 40 milliGRAM(s) SubCutaneous every 24 hours  lactobacillus acidophilus 1 Tablet(s) Oral two times a day with meals  lidocaine   4% Patch 1 Patch Transdermal daily  midodrine. 5 milliGRAM(s) Oral <User Schedule>  polyethylene glycol 3350 17 Gram(s) Oral daily  senna 2 Tablet(s) Oral at bedtime  trimethoprim  160 mG/sulfamethoxazole 800 mG 1 Tablet(s) Oral two times a day  zinc oxide 20% Ointment 1 Application(s) Topical two times a day    MEDICATIONS  (PRN):  acetaminophen     Tablet .. 650 milliGRAM(s) Oral every 6 hours PRN Mild Pain (1 - 3)  aluminum hydroxide/magnesium hydroxide/simethicone Suspension 30 milliLiter(s) Oral every 4 hours PRN Dyspepsia    Vital Signs Last 24 Hrs  T(F): 98.1 (10 Mar 2024 08:06), Max: 98.3 (09 Mar 2024 19:53)  HR: 66 (10 Mar 2024 08:06) (66 - 69)  BP: 146/78 (10 Mar 2024 08:06) (117/69 - 146/78)  RR: 16 (10 Mar 2024 08:06) (16 - 16)  SpO2: 95% (10 Mar 2024 08:06) (95% - 95%)  I&O's Summary    PHYSICAL EXAM:  General: NAD, A/O x 3  ENT: MMM, no scleral icterus  Neck: Supple, No JVD, no thyroidomegaly  Lungs: Clear to auscultation bilaterally, no wheezes, no rales, no rhonchi, good inspiratory effort  Cardio: RRR, S1/S2, No murmurs  Abdomen: Soft, Nontender, Nondistended; Bowel sounds present  Extremities: No calf tenderness, No pitting edema, no skin changes    LABS:                        11.4   9.39  )-----------( 429      ( 08 Mar 2024 05:24 )             35.7         Urinalysis Basic - ( 08 Mar 2024 04:45 )    Color: Yellow / Appearance: Cloudy / S.016 / pH: x  Gluc: x / Ketone: Negative mg/dL  / Bili: Negative / Urobili: 0.2 mg/dL   Blood: x / Protein: 300 mg/dL / Nitrite: Positive   Leuk Esterase: Small / RBC: 0 /HPF / WBC 4 /HPF   Sq Epi: x / Non Sq Epi: x / Bacteria: Many /HPF

## 2024-03-11 LAB
-  AMOXICILLIN/CLAVULANIC ACID: SIGNIFICANT CHANGE UP
-  AMPICILLIN/SULBACTAM: SIGNIFICANT CHANGE UP
-  AMPICILLIN: SIGNIFICANT CHANGE UP
-  AZTREONAM: SIGNIFICANT CHANGE UP
-  CEFAZOLIN: SIGNIFICANT CHANGE UP
-  CEFEPIME: SIGNIFICANT CHANGE UP
-  CEFOXITIN: SIGNIFICANT CHANGE UP
-  CEFTRIAXONE: SIGNIFICANT CHANGE UP
-  CIPROFLOXACIN: SIGNIFICANT CHANGE UP
-  ERTAPENEM: SIGNIFICANT CHANGE UP
-  GENTAMICIN: SIGNIFICANT CHANGE UP
-  LEVOFLOXACIN: SIGNIFICANT CHANGE UP
-  MEROPENEM: SIGNIFICANT CHANGE UP
-  NITROFURANTOIN: SIGNIFICANT CHANGE UP
-  PIPERACILLIN/TAZOBACTAM: SIGNIFICANT CHANGE UP
-  TOBRAMYCIN: SIGNIFICANT CHANGE UP
-  TRIMETHOPRIM/SULFAMETHOXAZOLE: SIGNIFICANT CHANGE UP
ALBUMIN SERPL ELPH-MCNC: 2.9 G/DL — LOW (ref 3.3–5)
ALP SERPL-CCNC: 67 U/L — SIGNIFICANT CHANGE UP (ref 40–120)
ALT FLD-CCNC: 15 U/L — SIGNIFICANT CHANGE UP (ref 10–45)
ANION GAP SERPL CALC-SCNC: 11 MMOL/L — SIGNIFICANT CHANGE UP (ref 5–17)
AST SERPL-CCNC: 19 U/L — SIGNIFICANT CHANGE UP (ref 10–40)
BASOPHILS # BLD AUTO: 0.04 K/UL — SIGNIFICANT CHANGE UP (ref 0–0.2)
BASOPHILS NFR BLD AUTO: 0.5 % — SIGNIFICANT CHANGE UP (ref 0–2)
BILIRUB SERPL-MCNC: 0.4 MG/DL — SIGNIFICANT CHANGE UP (ref 0.2–1.2)
BUN SERPL-MCNC: 28 MG/DL — HIGH (ref 7–23)
CALCIUM SERPL-MCNC: 9.1 MG/DL — SIGNIFICANT CHANGE UP (ref 8.4–10.5)
CHLORIDE SERPL-SCNC: 105 MMOL/L — SIGNIFICANT CHANGE UP (ref 96–108)
CO2 SERPL-SCNC: 26 MMOL/L — SIGNIFICANT CHANGE UP (ref 22–31)
CREAT SERPL-MCNC: 1.19 MG/DL — SIGNIFICANT CHANGE UP (ref 0.5–1.3)
CULTURE RESULTS: ABNORMAL
EGFR: 62 ML/MIN/1.73M2 — SIGNIFICANT CHANGE UP
EOSINOPHIL # BLD AUTO: 0.27 K/UL — SIGNIFICANT CHANGE UP (ref 0–0.5)
EOSINOPHIL NFR BLD AUTO: 3.7 % — SIGNIFICANT CHANGE UP (ref 0–6)
GLUCOSE SERPL-MCNC: 96 MG/DL — SIGNIFICANT CHANGE UP (ref 70–99)
HCT VFR BLD CALC: 35.5 % — LOW (ref 39–50)
HGB BLD-MCNC: 11.2 G/DL — LOW (ref 13–17)
IMM GRANULOCYTES NFR BLD AUTO: 0.8 % — SIGNIFICANT CHANGE UP (ref 0–0.9)
LYMPHOCYTES # BLD AUTO: 1.26 K/UL — SIGNIFICANT CHANGE UP (ref 1–3.3)
LYMPHOCYTES # BLD AUTO: 17.1 % — SIGNIFICANT CHANGE UP (ref 13–44)
MCHC RBC-ENTMCNC: 28.1 PG — SIGNIFICANT CHANGE UP (ref 27–34)
MCHC RBC-ENTMCNC: 31.5 GM/DL — LOW (ref 32–36)
MCV RBC AUTO: 89 FL — SIGNIFICANT CHANGE UP (ref 80–100)
METHOD TYPE: SIGNIFICANT CHANGE UP
MONOCYTES # BLD AUTO: 0.53 K/UL — SIGNIFICANT CHANGE UP (ref 0–0.9)
MONOCYTES NFR BLD AUTO: 7.2 % — SIGNIFICANT CHANGE UP (ref 2–14)
NEUTROPHILS # BLD AUTO: 5.22 K/UL — SIGNIFICANT CHANGE UP (ref 1.8–7.4)
NEUTROPHILS NFR BLD AUTO: 70.7 % — SIGNIFICANT CHANGE UP (ref 43–77)
NRBC # BLD: 0 /100 WBCS — SIGNIFICANT CHANGE UP (ref 0–0)
ORGANISM # SPEC MICROSCOPIC CNT: ABNORMAL
ORGANISM # SPEC MICROSCOPIC CNT: SIGNIFICANT CHANGE UP
PLATELET # BLD AUTO: 435 K/UL — HIGH (ref 150–400)
POTASSIUM SERPL-MCNC: 4.4 MMOL/L — SIGNIFICANT CHANGE UP (ref 3.5–5.3)
POTASSIUM SERPL-SCNC: 4.4 MMOL/L — SIGNIFICANT CHANGE UP (ref 3.5–5.3)
PROT SERPL-MCNC: 6.8 G/DL — SIGNIFICANT CHANGE UP (ref 6–8.3)
RBC # BLD: 3.99 M/UL — LOW (ref 4.2–5.8)
RBC # FLD: 14.2 % — SIGNIFICANT CHANGE UP (ref 10.3–14.5)
SODIUM SERPL-SCNC: 142 MMOL/L — SIGNIFICANT CHANGE UP (ref 135–145)
SPECIMEN SOURCE: SIGNIFICANT CHANGE UP
WBC # BLD: 7.38 K/UL — SIGNIFICANT CHANGE UP (ref 3.8–10.5)
WBC # FLD AUTO: 7.38 K/UL — SIGNIFICANT CHANGE UP (ref 3.8–10.5)

## 2024-03-11 PROCEDURE — 99232 SBSQ HOSP IP/OBS MODERATE 35: CPT

## 2024-03-11 RX ORDER — MIDODRINE HYDROCHLORIDE 2.5 MG/1
7.5 TABLET ORAL
Refills: 0 | Status: DISCONTINUED | OUTPATIENT
Start: 2024-03-11 | End: 2024-03-15

## 2024-03-11 RX ORDER — LANOLIN ALCOHOL/MO/W.PET/CERES
3 CREAM (GRAM) TOPICAL AT BEDTIME
Refills: 0 | Status: DISCONTINUED | OUTPATIENT
Start: 2024-03-11 | End: 2024-03-12

## 2024-03-11 RX ADMIN — Medication 1 TABLET(S): at 17:03

## 2024-03-11 RX ADMIN — CARBIDOPA AND LEVODOPA 1 TABLET(S): 25; 100 TABLET ORAL at 07:50

## 2024-03-11 RX ADMIN — CARBIDOPA AND LEVODOPA 1 TABLET(S): 25; 100 TABLET ORAL at 12:00

## 2024-03-11 RX ADMIN — Medication 3 MILLIGRAM(S): at 21:01

## 2024-03-11 RX ADMIN — ZINC OXIDE 1 APPLICATION(S): 200 OINTMENT TOPICAL at 16:08

## 2024-03-11 RX ADMIN — CARBIDOPA AND LEVODOPA 1 TABLET(S): 25; 100 TABLET ORAL at 16:03

## 2024-03-11 RX ADMIN — MIDODRINE HYDROCHLORIDE 7.5 MILLIGRAM(S): 2.5 TABLET ORAL at 12:00

## 2024-03-11 RX ADMIN — MIDODRINE HYDROCHLORIDE 5 MILLIGRAM(S): 2.5 TABLET ORAL at 06:18

## 2024-03-11 RX ADMIN — Medication 1 TABLET(S): at 06:18

## 2024-03-11 RX ADMIN — Medication 1 TABLET(S): at 07:49

## 2024-03-11 RX ADMIN — CARBIDOPA AND LEVODOPA 1 TABLET(S): 25; 100 TABLET ORAL at 21:01

## 2024-03-11 RX ADMIN — ENOXAPARIN SODIUM 40 MILLIGRAM(S): 100 INJECTION SUBCUTANEOUS at 06:17

## 2024-03-11 NOTE — PROGRESS NOTE ADULT - SUBJECTIVE AND OBJECTIVE BOX
CC: Patient is a 78y old  Male who presents with a chief complaint of Left hip replacement (10 Mar 2024 16:41)      Interval History:    No overnight events.  No new complaints.    ALLERGIES:  No Known Allergies    MEDICATIONS  (STANDING):  carbidopa/levodopa  25/100 1 Tablet(s) Oral <User Schedule>  enoxaparin Injectable 40 milliGRAM(s) SubCutaneous every 24 hours  lactobacillus acidophilus 1 Tablet(s) Oral two times a day with meals  lidocaine   4% Patch 1 Patch Transdermal daily  midodrine. 5 milliGRAM(s) Oral <User Schedule>  polyethylene glycol 3350 17 Gram(s) Oral daily  senna 2 Tablet(s) Oral at bedtime  trimethoprim  160 mG/sulfamethoxazole 800 mG 1 Tablet(s) Oral two times a day  zinc oxide 20% Ointment 1 Application(s) Topical two times a day    MEDICATIONS  (PRN):  acetaminophen     Tablet .. 650 milliGRAM(s) Oral every 6 hours PRN Mild Pain (1 - 3)  aluminum hydroxide/magnesium hydroxide/simethicone Suspension 30 milliLiter(s) Oral every 4 hours PRN Dyspepsia    Vital Signs Last 24 Hrs  T(F): 97.6 (11 Mar 2024 07:40), Max: 97.6 (11 Mar 2024 07:40)  HR: 91 (11 Mar 2024 07:40) (69 - 91)  BP: 133/70 (11 Mar 2024 07:40) (133/70 - 144/77)  RR: 15 (11 Mar 2024 07:40) (15 - 16)  SpO2: 95% (11 Mar 2024 07:40) (95% - 95%)  I&O's Summary        PHYSICAL EXAM:  GENERAL: NAD  CHEST/LUNG: No rales, rhonchi, wheezing; normal respiratory effort  HEART: RRR; No murmurs, rubs, or gallops  ABDOMEN: Soft, Nontender, Nondistended; Bowel sounds present  MSK/EXT:  No peripheral edema, 2+ Peripheral Pulses, No clubbing or digital cyanosis  PSYCH: Appropriate affect, Alert & Oriented    LABS:                        11.2   7.38  )-----------( 435      ( 11 Mar 2024 05:21 )             35.5       03-11    142  |  105  |  28  ----------------------------<  96  4.4   |  26  |  1.19    Ca    9.1      11 Mar 2024 05:21    TPro  6.8  /  Alb  2.9  /  TBili  0.4  /  DBili  x   /  AST  19  /  ALT  15  /  AlkPhos  67  03-11            Urinalysis Basic - ( 11 Mar 2024 05:21 )    Color: x / Appearance: x / SG: x / pH: x  Gluc: 96 mg/dL / Ketone: x  / Bili: x / Urobili: x   Blood: x / Protein: x / Nitrite: x   Leuk Esterase: x / RBC: x / WBC x   Sq Epi: x / Non Sq Epi: x / Bacteria: x        Culture - Urine (collected 08 Mar 2024 16:01)  Source: Clean Catch Clean Catch (Midstream)  Preliminary Report (10 Mar 2024 23:28):    >100,000 CFU/ml Proteus vulgaris group          Care Discussed with Consultants/Other Providers: Yes

## 2024-03-11 NOTE — PROGRESS NOTE ADULT - SUBJECTIVE AND OBJECTIVE BOX
Acute bronchitis and will treat with Depo-Medrol 40 IM along with Rocephin 1 g IM.  Doxycycline 100 mg twice daily.  Follow-up on a p.r.n. basis.  Continue her albuterol.  Continue her Symbicort.   SUBJECTIVE/ROS: Patient seen at bedside, with his wife present. Poor sleep with difficulties falling asleep and RBD with dream enactment. Starts melatonin 3 mg tonight. Denies pain at the left hip and dysuria. On Bactrim since 3/8, preliminary results of urine culture from 3/8 positive for Proteus Vulgaris. No more episodes of syncope, still very hypotensive this morning on standing (133/70 mmHg laying down to 74/50 mmHg standing), increased midodrine to 7.5 mg BID, will keep monitoring vitals.      HPI:  77 year old male of Parkinson's disease since 2022 and follows with Dr. Ortega, prior colon CA w/ ostomy, prior prostate CA s/p radiation cystitis, blurry vision 2/2 cataracts baseline, who presented to ED at [facility] on 2/27 s/p mechanical fall at home. Per wife patient ambulated without devise in the house, and has had multiple falls due to poor balance from Parkinson's, but usually falls on his buttocks. He was found to have a left hip fracture and underwent Left hip replacement with Dr. Diaz. Post operatively he had orthostatic hypotension which self resolved. His hospital course was uneventful and he was evaluated by PT and OT who recommended acute rehab. He was admitted to [facility] on 3/1/24.      Vital Signs Last 24 Hrs  T(C): 36.4 (11 Mar 2024 07:40), Max: 36.4 (11 Mar 2024 07:40)  T(F): 97.6 (11 Mar 2024 07:40), Max: 97.6 (11 Mar 2024 07:40)  HR: 91 (11 Mar 2024 07:40) (69 - 91)  BP: 133/70 (11 Mar 2024 07:40) (133/70 - 144/77)  BP(mean): --  RR: 15 (11 Mar 2024 07:40) (15 - 16)  SpO2: 95% (11 Mar 2024 07:40) (95% - 95%)    Parameters below as of 11 Mar 2024 07:40  Patient On (Oxygen Delivery Method): room air    PHYSICAL EXAM  Constitutional - NAD, Comfortable  HEENT - NCAT  Chest - Breathing comfortably in room air   Extremities - no edema   Neurologic Exam -   awake, alert, repeatedly partially disoriented in time (Thursday, 3/24/2024) with impaired recall of correct date and day of the week at 2 min, oriented on situation. Mildly restricted upward gaze. Hypomimia with staring expression and hyperactive frontalis. Severe hypokinetic dysarthria with festination and poor intelligibility. Moderate rigidity with trochlea. Trace rest tremor at right V digit, mild to moderate kinetic > postural tremor worse on the right. Moderate bradykinesia worse on the right. Gait deferred. No sensory deficits.    LABS:                        11.2   7.38  )-----------( 435      ( 11 Mar 2024 05:21 )             35.5     03-11    142  |  105  |  28<H>  ----------------------------<  96  4.4   |  26  |  1.19    Ca    9.1      11 Mar 2024 05:21    TPro  6.8  /  Alb  2.9<L>  /  TBili  0.4  /  DBili  x   /  AST  19  /  ALT  15  /  AlkPhos  67  03-11    LIVER FUNCTIONS - ( 11 Mar 2024 05:21 )  Alb: 2.9 g/dL / Pro: 6.8 g/dL / ALK PHOS: 67 U/L / ALT: 15 U/L / AST: 19 U/L / GGT: x           MEDICATIONS  (STANDING):  carbidopa/levodopa  25/100 1 Tablet(s) Oral <User Schedule>  enoxaparin Injectable 40 milliGRAM(s) SubCutaneous every 24 hours  lactobacillus acidophilus 1 Tablet(s) Oral two times a day with meals  lidocaine   4% Patch 1 Patch Transdermal daily  melatonin 3 milliGRAM(s) Oral at bedtime  midodrine. 7.5 milliGRAM(s) Oral <User Schedule>  polyethylene glycol 3350 17 Gram(s) Oral daily  senna 2 Tablet(s) Oral at bedtime  trimethoprim  160 mG/sulfamethoxazole 800 mG 1 Tablet(s) Oral two times a day  zinc oxide 20% Ointment 1 Application(s) Topical two times a day    MEDICATIONS  (PRN):  acetaminophen     Tablet .. 650 milliGRAM(s) Oral every 6 hours PRN Mild Pain (1 - 3)  aluminum hydroxide/magnesium hydroxide/simethicone Suspension 30 milliLiter(s) Oral every 4 hours PRN Dyspepsia

## 2024-03-11 NOTE — PROGRESS NOTE ADULT - ASSESSMENT
ASSESSMENT/PLAN  77 year old male of Parkinson's disease since 2022 and follows with Dr. Ortega, prior colon CA w/ ostomy, prior prostate CA s/p radiation cystitis, blurry vision 2/2 cataracts baseline, who presented to ED at [facility] on 2/27 s/p mechanical fall at home. Per wife patient ambulated without devise in the house, and has had multiple falls due to poor balance from Parkinson's but usually falls on his buttocks. He was found to have a left hip fracture and underwent Left hip replacement with Dr. Diaz. Post operatively he had orthostatic hypotension which self resolved. His hospital course was uneventful and he was evaluated by PT and OT who recommended acute rehab. He was admitted to [facility] on 3/1/24.   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    #Left hip fracture s/p hip replacement now with gait Instability, ADL impairments and Functional impairments  - Continue Comprehensive Rehab Program of PT/OT/SLP  -Continue Lovenox for DVT prophylaxis and then switch to aspirin 81mg BID for total of 6 weeks  -Continue total hip precautions with abduction pillow in bed   -Continue weight bearing as tolerated    ------------------------------------------------------  #Parkinson's related motor symptoms bradykinesia, falls, rigidity, tremors  -Continue Sinemet 25/100mg 1 tab at 8am, 12pm, 4pm and 8pm  -Movement disorders following    --------------------------------------------------------  Parkinson's related non-motor symptoms    #Orthostatic hypotension  - Convulsive Syncopal event on 3/6 -self resolved when lying flat   - Completed 500 cc bolus  - basic labs, mag, Phosp, troponin EKG, UA 3/8 preliminarily positive for P. Vulgaris, on Bactrim and Pyridium since 3/8, remission of symptoms   -Increased midodrine to 7.5mg BID- 3/11    #Pain control  - Tylenol PRN  - Tramadol PRN  - Lidocaine patch    #GI/Bowel Management/Constipation  - Continue Senna at bedtime   - Miralax daily 3/4  - Last BM 3/7    #Bladder management  - Continue to monitor PVR q 8 hours (SC if > 400)  - Monitor UO    #RBD  - Consider starting melatonin if bothersome (caregiver declined for now)    ----------------------------------------------------------  Concurrent medical problems    #DVT Prophylaxis  - Lovenox  - TEDs     #Skin:  - Left hip incision with aquacel dressing    FEN   - Diet - Regular with thins  - Dysphagia  SLP - evaluation and treatment    Precautions / PROPHYLAXIS:   - Falls  - ortho: Weight bearing status: WBAT with hip precautions   - Lungs: Incentive Spirometer   - Pressure injury/Skin: OOB to Chair, PT/OT        Follow up     Sonido Alvarez  Family Medicine  [address] [place] [id number]  Phone: [phone]  Fax: [phone]  Follow Up Time:     Adonis Diaz  Orthopaedic Surgery  [address] [place] [address] [address]  [place] [address]  Phone: [phone]  Fax: [phone]

## 2024-03-11 NOTE — PROGRESS NOTE ADULT - ASSESSMENT
78 y/o man of Parkinson's disease since 2022 and follows with Dr. Ortega, prior colon CA w/ ostomy, prior prostate CA s/p radiation cystitis, blurry vision 2/2 cataracts baseline, who presented to ED at [facility] on 2/27 s/p mechanical fall at home. Per wife patient ambulated without devise in the house, and has had multiple falls due to poor balance from Parkinson's but usually falls on his buttocks. He was found to have a left hip fracture and underwent Left hip replacement with Dr. Diaz. Post operatively he had orthostatic hypotension which self resolved. His hospital course was uneventful and he was evaluated by PT and OT who recommended acute rehab. He was admitted to [facility] on 3/1/24- pt/ot/dvt ppx     s/p fall, Left hip fracture s/p hip replacement   -comprehensive rehab program  -Continue Lovenox for DVT prophylaxis and then switch to aspirin 81mg BID for total of 6 weeks    s/p RRT 3/6/24 due to hypotensive syncope, BP- 90's  s/p iv bolus- BP improved   midodrine increased to 5  bid 3/6/24    Parkinson's  bradykinesia, falls, rigidity, tremors  -Continue Sinemet     Orthostatic hypotension likely related to PD   - midodrine 5mg 6am and 1pm    DVT Prophylaxis- Lovenox 76 y/o man of Parkinson's disease since 2022 and follows with Dr. Ortega, prior colon CA w/ ostomy, prior prostate CA s/p radiation cystitis, blurry vision 2/2 cataracts baseline, who presented to ED at [facility] on 2/27 s/p mechanical fall at home. Per wife patient ambulated without devise in the house, and has had multiple falls due to poor balance from Parkinson's but usually falls on his buttocks. He was found to have a left hip fracture and underwent Left hip replacement with Dr. Diaz. Post operatively he had orthostatic hypotension which self resolved. His hospital course was uneventful and he was evaluated by PT and OT who recommended acute rehab. He was admitted to [facility] on 3/1/24- pt/ot/dvt ppx     s/p fall, Left hip fracture s/p hip replacement   -comprehensive rehab program  -Continue Lovenox for DVT prophylaxis and then switch to aspirin 81mg BID for total of 6 weeks    s/p RRT 3/6/24 due to hypotensive syncope, BP- 90's  s/p iv bolus- BP improved   midodrine increased to 5  bid 3/6/24    Parkinson's  bradykinesia, falls, rigidity, tremors  -Continue Sinemet     Orthostatic hypotension likely related to PD   - midodrine 5mg increased to 7.5mg BID on 3/11    DVT Prophylaxis- Lovenox

## 2024-03-12 PROCEDURE — 99232 SBSQ HOSP IP/OBS MODERATE 35: CPT

## 2024-03-12 RX ORDER — CEFTRIAXONE 500 MG/1
1000 INJECTION, POWDER, FOR SOLUTION INTRAMUSCULAR; INTRAVENOUS EVERY 24 HOURS
Refills: 0 | Status: DISCONTINUED | OUTPATIENT
Start: 2024-03-12 | End: 2024-03-12

## 2024-03-12 RX ORDER — LANOLIN ALCOHOL/MO/W.PET/CERES
6 CREAM (GRAM) TOPICAL AT BEDTIME
Refills: 0 | Status: DISCONTINUED | OUTPATIENT
Start: 2024-03-12 | End: 2024-03-15

## 2024-03-12 RX ORDER — SODIUM CHLORIDE 9 MG/ML
1000 INJECTION INTRAMUSCULAR; INTRAVENOUS; SUBCUTANEOUS
Refills: 0 | Status: COMPLETED | OUTPATIENT
Start: 2024-03-12 | End: 2024-03-12

## 2024-03-12 RX ORDER — CEFPODOXIME PROXETIL 100 MG
200 TABLET ORAL
Refills: 0 | Status: DISCONTINUED | OUTPATIENT
Start: 2024-03-12 | End: 2024-03-15

## 2024-03-12 RX ADMIN — MIDODRINE HYDROCHLORIDE 7.5 MILLIGRAM(S): 2.5 TABLET ORAL at 12:17

## 2024-03-12 RX ADMIN — Medication 6 MILLIGRAM(S): at 20:08

## 2024-03-12 RX ADMIN — CARBIDOPA AND LEVODOPA 1 TABLET(S): 25; 100 TABLET ORAL at 15:55

## 2024-03-12 RX ADMIN — CARBIDOPA AND LEVODOPA 1 TABLET(S): 25; 100 TABLET ORAL at 12:17

## 2024-03-12 RX ADMIN — MIDODRINE HYDROCHLORIDE 7.5 MILLIGRAM(S): 2.5 TABLET ORAL at 05:29

## 2024-03-12 RX ADMIN — CARBIDOPA AND LEVODOPA 1 TABLET(S): 25; 100 TABLET ORAL at 20:08

## 2024-03-12 RX ADMIN — Medication 200 MILLIGRAM(S): at 17:52

## 2024-03-12 RX ADMIN — Medication 1 TABLET(S): at 17:52

## 2024-03-12 RX ADMIN — ZINC OXIDE 1 APPLICATION(S): 200 OINTMENT TOPICAL at 05:23

## 2024-03-12 RX ADMIN — Medication 1 TABLET(S): at 05:28

## 2024-03-12 RX ADMIN — ZINC OXIDE 1 APPLICATION(S): 200 OINTMENT TOPICAL at 17:26

## 2024-03-12 RX ADMIN — ENOXAPARIN SODIUM 40 MILLIGRAM(S): 100 INJECTION SUBCUTANEOUS at 05:29

## 2024-03-12 RX ADMIN — Medication 1 TABLET(S): at 08:34

## 2024-03-12 RX ADMIN — CARBIDOPA AND LEVODOPA 1 TABLET(S): 25; 100 TABLET ORAL at 08:34

## 2024-03-12 NOTE — PROGRESS NOTE ADULT - ASSESSMENT
ASSESSMENT/PLAN  77 year old male of Parkinson's disease since 2022 and follows with Dr. Ortega, prior colon CA w/ ostomy, prior prostate CA s/p radiation cystitis, blurry vision 2/2 cataracts baseline, who presented to ED at [facility] on 2/27 s/p mechanical fall at home. Per wife patient ambulated without devise in the house, and has had multiple falls due to poor balance from Parkinson's but usually falls on his buttocks. He was found to have a left hip fracture and underwent Left hip replacement with Dr. Diaz. Post operatively he had orthostatic hypotension which self resolved. His hospital course was uneventful and he was evaluated by PT and OT who recommended acute rehab. He was admitted to [facility] on 3/1/24.   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    #Left hip fracture s/p hip replacement now with gait Instability, ADL impairments and Functional impairments  - Continue Comprehensive Rehab Program of PT/OT/SLP  -Continue Lovenox for DVT prophylaxis and then switch to aspirin 81mg BID for total of 6 weeks  -Continue total hip precautions with abduction pillow in bed   -Continue weight bearing as tolerated    ------------------------------------------------------  #Parkinson's related motor symptoms bradykinesia, falls, rigidity, tremors  -Continue Sinemet 25/100mg 1 tab at 8am, 12pm, 4pm and 8pm  -Movement disorders following    --------------------------------------------------------  Parkinson's related non-motor symptoms    #Orthostatic hypotension  - Convulsive Syncopal event on 3/6 -self resolved when lying flat   - Completed 500 cc bolus  - basic labs, mag, Phosp, troponin EKG, UA 3/8 positive for P. Vulgaris normosensitive, on Bactrim since 3/8, remission of symptoms   -Increased midodrine to 7.5mg BID- 3/11    #Pain control  - Tylenol PRN  - Tramadol PRN  - Lidocaine patch    #GI/Bowel Management/Constipation  - Continue Senna at bedtime   - Miralax daily 3/4  - Last BM 3/7    #Bladder management  - Continue to monitor PVR q 8 hours (SC if > 400)  - Monitor UO    #RBD  - Melatonin 3 mg on 3/11, 6 mg on 3/12    ----------------------------------------------------------  Concurrent medical problems    #DVT Prophylaxis  - Lovenox  - TEDs     #Skin:  - Left hip incision with aquacel dressing    FEN   - Diet - Regular with thins  - Dysphagia  SLP - evaluation and treatment    Precautions / PROPHYLAXIS:   - Falls  - ortho: Weight bearing status: WBAT with hip precautions   - Lungs: Incentive Spirometer   - Pressure injury/Skin: OOB to Chair, PT/OT        Follow up     Sonido Alvarez  Family Medicine  [address] [place] [id number]  Phone: [phone]  Fax: [phone]  Follow Up Time:     Adonis Diaz  Orthopaedic Surgery  [address] [place] [address], [address]  [place] [address]  Phone: [phone]  Fax: [phone]

## 2024-03-12 NOTE — PROGRESS NOTE ADULT - SUBJECTIVE AND OBJECTIVE BOX
PROGRESS NOTE:     Patient is a 78y old  Male who presents with a chief complaint of Left hip replacement (12 Mar 2024 10:40)          SUBJECTIVE & OBJECTIVE:   Pt seen and examined at bedside in AM    no overnight events.   no new complaints    REVIEW OF SYSTEMS: remaining ROS negative     PHYSICAL EXAM:  VITALS:  Vital Signs Last 24 Hrs  T(C): 36.5 (12 Mar 2024 07:45), Max: 36.5 (11 Mar 2024 20:57)  T(F): 97.7 (12 Mar 2024 07:45), Max: 97.7 (11 Mar 2024 20:57)  HR: 75 (12 Mar 2024 07:45) (69 - 75)  BP: 130/71 (12 Mar 2024 07:45) (130/71 - 131/72)  BP(mean): --  RR: 16 (12 Mar 2024 12:10) (15 - 16)  SpO2: 95% (12 Mar 2024 12:10) (94% - 95%)    Parameters below as of 12 Mar 2024 12:10  Patient On (Oxygen Delivery Method): room air          GENERAL: NAD,  no increased WOB  HEAD:  Atraumatic, Normocephalic  EYES: EOMI, conjunctiva and sclera clear  ENMT: Moist mucous membranes  NECK: Supple, No JVD  NERVOUS SYSTEM:  Alert   CHEST/LUNG: Clear to auscultation bilaterally; No rales, rhonchi, wheezing  HEART: Regular rate and rhythm;   ABDOMEN: Soft, Nontender, Nondistended; Bowel sounds present  EXTREMITIES: No clubbing, cyanosis, calf tenderness or edema b/l      MEDICATIONS  (STANDING):  carbidopa/levodopa  25/100 1 Tablet(s) Oral <User Schedule>  enoxaparin Injectable 40 milliGRAM(s) SubCutaneous every 24 hours  lactobacillus acidophilus 1 Tablet(s) Oral two times a day with meals  lidocaine   4% Patch 1 Patch Transdermal daily  melatonin 6 milliGRAM(s) Oral at bedtime  midodrine. 7.5 milliGRAM(s) Oral <User Schedule>  polyethylene glycol 3350 17 Gram(s) Oral daily  senna 2 Tablet(s) Oral at bedtime  trimethoprim  160 mG/sulfamethoxazole 800 mG 1 Tablet(s) Oral two times a day  zinc oxide 20% Ointment 1 Application(s) Topical two times a day    MEDICATIONS  (PRN):  acetaminophen     Tablet .. 650 milliGRAM(s) Oral every 6 hours PRN Mild Pain (1 - 3)  aluminum hydroxide/magnesium hydroxide/simethicone Suspension 30 milliLiter(s) Oral every 4 hours PRN Dyspepsia      Allergies    No Known Allergies    Intolerances              LABS:                           11.2   7.38  )-----------( 435      ( 11 Mar 2024 05:21 )             35.5     03-11    142  |  105  |  28<H>  ----------------------------<  96  4.4   |  26  |  1.19    Ca    9.1      11 Mar 2024 05:21    TPro  6.8  /  Alb  2.9<L>  /  TBili  0.4  /  DBili  x   /  AST  19  /  ALT  15  /  AlkPhos  67  03-11      Urinalysis Basic - ( 11 Mar 2024 05:21 )    Color: x / Appearance: x / SG: x / pH: x  Gluc: 96 mg/dL / Ketone: x  / Bili: x / Urobili: x   Blood: x / Protein: x / Nitrite: x   Leuk Esterase: x / RBC: x / WBC x   Sq Epi: x / Non Sq Epi: x / Bacteria: x      CAPILLARY BLOOD GLUCOSE                    RECENT CULTURES:          RADIOLOGY & ADDITIONAL TESTS:          Care Discussed with Consultants/Other Providers: Yes

## 2024-03-12 NOTE — PROGRESS NOTE ADULT - ASSESSMENT
76 y/o man of Parkinson's disease since 2022 and follows with Dr. Ortega, prior colon CA w/ ostomy, prior prostate CA s/p radiation cystitis, blurry vision 2/2 cataracts baseline, who presented to ED at [facility] on 2/27 s/p mechanical fall at home. Per wife patient ambulated without devise in the house, and has had multiple falls due to poor balance from Parkinson's but usually falls on his buttocks. He was found to have a left hip fracture and underwent Left hip replacement with Dr. Diaz. Post operatively he had orthostatic hypotension which self resolved. His hospital course was uneventful and he was evaluated by PT and OT who recommended acute rehab. He was admitted to [facility] on 3/1/24- pt/ot/dvt ppx      Left hip fracture s/p hip replacement    -s/p fall,  -comprehensive rehab program  -Continue Lovenox for DVT prophylaxis and then switch to aspirin 81mg BID for total of 6 weeks    UTI  - UA positive, UCx positive for P. Valgarus  - Currently on Bactrim  - Recommending switching to Vantin 200mg BID for 14 days    Parkinson's  bradykinesia, falls, rigidity, tremors  -Continue Sinemet     Orthostatic hypotension likely related to PD   - midodrine 5mg increased to 7.5mg BID on 3/11    DVT Prophylaxis- Lovenox

## 2024-03-12 NOTE — PROVIDER CONTACT NOTE (OTHER) - ASSESSMENT
pt and pt spouse refused IV access for IV antibiotics and IV fluids. Pt and pt spouse educated on the importance of antibiotics and fluids. MD made aware.
No n/v, headaches, or dizziness. VS: laying /74, HR 77, SPO2 96%.

## 2024-03-12 NOTE — PROVIDER CONTACT NOTE (OTHER) - BACKGROUND
Hx Parkinsons and orthostatic hypotension. Currently taking midodrine 5mg BID.
Admit diagnosis: Parkinson's disease

## 2024-03-12 NOTE — PROVIDER CONTACT NOTE (OTHER) - ACTION/TREATMENT ORDERED:
Stat labs, chest XRAY, midodrine 5mg PO, NS bolus, ECG ordered.
Pt encourage oral hydration and oral antibiotics.

## 2024-03-12 NOTE — PROGRESS NOTE ADULT - SUBJECTIVE AND OBJECTIVE BOX
SUBJECTIVE/ROS: Patient seen while in bed, with his son present in the room. Poor sleep again yesterday night with prominent RBD. Melatonin increased to 6 mg daily. Denies pain at the left hip and dysuria. On Bactrim since 3/8, Proteus Vulgaris sensitive to it on antibiogram. No more episodes of syncope. Tolerating increased midodrine well. Will keep monitoring orthostatic vitals. Son and patient describe recurring short episodes of arms shaking, preceded by unspecified prodromal sensation, occurring in bed, with prior work up negative for epilepsy (videoEEG negative but no episodes occurred during exam). Advised on getting a video of episodes to better evaluate phenomenology. Orthopedic consult ordered for follow up on wound management.     HPI:  77 year old male of Parkinson's disease since 2022 and follows with Dr. Ortega, prior colon CA w/ ostomy, prior prostate CA s/p radiation cystitis, blurry vision 2/2 cataracts baseline, who presented to ED at [facility] on 2/27 s/p mechanical fall at home. Per wife patient ambulated without devise in the house, and has had multiple falls due to poor balance from Parkinson's, but usually falls on his buttocks. He was found to have a left hip fracture and underwent Left hip replacement with Dr. Diaz. Post operatively he had orthostatic hypotension which self resolved. His hospital course was uneventful and he was evaluated by PT and OT who recommended acute rehab. He was admitted to [facility] on 3/1/24.      Vital Signs Last 24 Hrs  T(C): 36.5 (11 Mar 2024 20:57), Max: 36.5 (11 Mar 2024 20:57)  T(F): 97.7 (11 Mar 2024 20:57), Max: 97.7 (11 Mar 2024 20:57)  HR: 69 (11 Mar 2024 20:57) (69 - 69)  BP: 131/72 (11 Mar 2024 20:57) (131/72 - 131/72)  BP(mean): --  RR: 15 (11 Mar 2024 20:57) (15 - 15)  SpO2: 94% (11 Mar 2024 20:57) (94% - 94%)    Parameters below as of 11 Mar 2024 20:57  Patient On (Oxygen Delivery Method): room air    PHYSICAL EXAM  Constitutional - NAD, Comfortable  HEENT - NCAT  Chest - Breathing comfortably in room air   Extremities - no edema   Neurologic Exam -   awake, alert, oriented on situation, partially oriented on time. Hypomimia with staring expression and hyperactive frontalis. Severe hypokinetic dysarthria with festination and poor intelligibility. Moderate rigidity with trochlea. Moderate kinetic > postural tremor worse on the right. Moderate bradykinesia. Gait deferred.    MEDICATIONS  (STANDING):  carbidopa/levodopa  25/100 1 Tablet(s) Oral <User Schedule>  enoxaparin Injectable 40 milliGRAM(s) SubCutaneous every 24 hours  lactobacillus acidophilus 1 Tablet(s) Oral two times a day with meals  lidocaine   4% Patch 1 Patch Transdermal daily  melatonin 6 milliGRAM(s) Oral at bedtime  midodrine. 7.5 milliGRAM(s) Oral <User Schedule>  polyethylene glycol 3350 17 Gram(s) Oral daily  senna 2 Tablet(s) Oral at bedtime  trimethoprim  160 mG/sulfamethoxazole 800 mG 1 Tablet(s) Oral two times a day  zinc oxide 20% Ointment 1 Application(s) Topical two times a day    MEDICATIONS  (PRN):  acetaminophen     Tablet .. 650 milliGRAM(s) Oral every 6 hours PRN Mild Pain (1 - 3)  aluminum hydroxide/magnesium hydroxide/simethicone Suspension 30 milliLiter(s) Oral every 4 hours PRN Dyspepsia

## 2024-03-13 PROCEDURE — 99233 SBSQ HOSP IP/OBS HIGH 50: CPT

## 2024-03-13 RX ADMIN — SENNA PLUS 2 TABLET(S): 8.6 TABLET ORAL at 20:21

## 2024-03-13 RX ADMIN — ZINC OXIDE 1 APPLICATION(S): 200 OINTMENT TOPICAL at 18:09

## 2024-03-13 RX ADMIN — Medication 200 MILLIGRAM(S): at 05:24

## 2024-03-13 RX ADMIN — Medication 200 MILLIGRAM(S): at 17:42

## 2024-03-13 RX ADMIN — Medication 1 TABLET(S): at 17:43

## 2024-03-13 RX ADMIN — Medication 6 MILLIGRAM(S): at 20:22

## 2024-03-13 RX ADMIN — CARBIDOPA AND LEVODOPA 1 TABLET(S): 25; 100 TABLET ORAL at 20:22

## 2024-03-13 RX ADMIN — CARBIDOPA AND LEVODOPA 1 TABLET(S): 25; 100 TABLET ORAL at 16:10

## 2024-03-13 RX ADMIN — CARBIDOPA AND LEVODOPA 1 TABLET(S): 25; 100 TABLET ORAL at 07:54

## 2024-03-13 RX ADMIN — Medication 1 TABLET(S): at 07:54

## 2024-03-13 RX ADMIN — MIDODRINE HYDROCHLORIDE 7.5 MILLIGRAM(S): 2.5 TABLET ORAL at 05:24

## 2024-03-13 RX ADMIN — CARBIDOPA AND LEVODOPA 1 TABLET(S): 25; 100 TABLET ORAL at 12:15

## 2024-03-13 RX ADMIN — ENOXAPARIN SODIUM 40 MILLIGRAM(S): 100 INJECTION SUBCUTANEOUS at 05:25

## 2024-03-13 RX ADMIN — ZINC OXIDE 1 APPLICATION(S): 200 OINTMENT TOPICAL at 05:19

## 2024-03-13 RX ADMIN — MIDODRINE HYDROCHLORIDE 7.5 MILLIGRAM(S): 2.5 TABLET ORAL at 12:15

## 2024-03-13 NOTE — PROGRESS NOTE ADULT - ATTENDING COMMENTS
I have personally seen and examined the patient with the fellow. Medical records reviewed. I have made amendments to the documentation where necessary and adjusted the history, physical examination, and plan as documented by the fellow.
I have personally seen and examined the patient with the fellow. Medical records reviewed. I have made amendments to the documentation where necessary and adjusted the history, physical examination, and plan as documented by the fellow.    New UTI - UA noted, Bactrim started, urine culture is pending  Medicine input
I have personally seen and examined the patient with the fellow. Medical records reviewed. I have made amendments to the documentation where necessary and adjusted the history, physical examination, and plan as documented by the fellow.    Wife at bedside - questions answered
I have personally seen and examined the patient with the fellow. Medical records reviewed. I have made amendments to the documentation where necessary and adjusted the history, physical examination, and plan as documented by the fellow.
I have personally seen and examined the patient with the fellow. Medical records reviewed. I have made amendments to the documentation where necessary and adjusted the history, physical examination, and plan as documented by the fellow.         Multidisciplinary team meeting today:  patient's functional goals and needs, functional and clinical  progress were discussed, barriers to discharge were identified. Anticipate discharge home with home care, 24/7 supervision for safety vs [facility] placement.      EDOD  3/15/24     Comprehensive family (wife and son) training accomplished today - PT, OT, SLP.     Spoke with wife and son at length regarding safe discharge plan - team is concerned that wife will not be able to provide adequate help at home safely. To avoid falls, injuries and ensure safe discharge plan,  team recommends additional private aids at home  to help wife or short term [facility] placement as next step to continue inpatient functional rehabilitation before discharge to home.        65 min spent
I have personally seen and examined the patient with the fellow. Medical records reviewed. I have made amendments to the documentation where necessary and adjusted the history, physical examination, and plan as documented by the fellow.    I was called as part of RTT to the PT gym -  patient sustained am orthostatic convulsive syncope. No fall, no injuries.  BP stabilized - IVF, TEDS, abdominal binder, Midodrine started   Returned to neurological baseline - returned to the room,  Daughter at bedside - detailed update given, all question answered to her satisfactions.  Case discussed with Medicine     55 min spent

## 2024-03-13 NOTE — PROGRESS NOTE ADULT - ASSESSMENT
ASSESSMENT/PLAN  77 year old male of Parkinson's disease since 2022 and follows with Dr. Ortega, prior colon CA w/ ostomy, prior prostate CA s/p radiation cystitis, blurry vision 2/2 cataracts baseline, who presented to ED at [facility] on 2/27 s/p mechanical fall at home. Per wife patient ambulated without devise in the house, and has had multiple falls due to poor balance from Parkinson's but usually falls on his buttocks. He was found to have a left hip fracture and underwent Left hip replacement with Dr. Diaz. Post operatively he had orthostatic hypotension which self resolved. His hospital course was uneventful and he was evaluated by PT and OT who recommended acute rehab. He was admitted to [facility] on 3/1/24.   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    #Left hip fracture s/p hip replacement now with gait Instability, ADL impairments and Functional impairments  - Continue Comprehensive Rehab Program of PT/OT/SLP  -Continue Lovenox for DVT prophylaxis and then switch to aspirin 81mg BID for total of 6 weeks  -Continue total hip precautions with abduction pillow in bed   -Continue weight bearing as tolerated  -Orthopedic consult     ------------------------------------------------------  #Parkinson's related motor symptoms bradykinesia, falls, rigidity, tremors  -Continue Sinemet 25/100mg 1 tab at 8am, 12pm, 4pm and 8pm  -Movement disorders following    --------------------------------------------------------  Parkinson's related non-motor symptoms    #Orthostatic hypotension  - Convulsive Syncopal event on 3/6 -self resolved when lying flat   - Completed 500 cc bolus  - basic labs, mag, Phosp, troponin EKG, UA 3/8 positive for P. Vulgaris normosensitive, on Bactrim since 3/8, remission of symptoms   -Increased midodrine to 7.5mg BID- 3/11    #Pain control  - Tylenol PRN  - Tramadol PRN  - Lidocaine patch    #GI/Bowel Management/Constipation  - Continue Senna at bedtime   - Miralax daily 3/4  - Last BM 3/7    #Bladder management  - Continue to monitor PVR q 8 hours (SC if > 400)  - Monitor UO    #UTI  - 3/8 preliminary culture positive for P. Vulgaris, on Bactrim from 3/8 to 3/12, on Vantin 200 mg BID since 3/12, recommended duration of treatment 14 days.     #RBD   - Melatonin 3 mg on 3/11, 6 mg on 3/12 - very good response to 6 mg    ----------------------------------------------------------  Concurrent medical problems    #DVT Prophylaxis  - Lovenox  - TEDs     #Skin:  - Left hip incision with aquacel dressing    FEN   - Diet - Regular with thins  - Dysphagia  SLP - evaluation and treatment    Precautions / PROPHYLAXIS:   - Falls  - ortho: Weight bearing status: WBAT with hip precautions   - Lungs: Incentive Spirometer   - Pressure injury/Skin: OOB to Chair, PT/OT        Follow up     Sonido Alvarez  Family Medicine  [address] [place] [address]  Phone: [phone]  Fax: [phone]  Follow Up Time:     Adonis Diaz  Orthopaedic Surgery  [address] [place] [address], [address]  [place] [address]  Phone: [phone]  Fax: [phone]   ASSESSMENT/PLAN  77 year old male of Parkinson's disease since 2022 and follows with Dr. Ortega, prior colon CA w/ ostomy, prior prostate CA s/p radiation cystitis, blurry vision 2/2 cataracts baseline, who presented to ED at [facility] on 2/27 s/p mechanical fall at home. Per wife patient ambulated without devise in the house, and has had multiple falls due to poor balance from Parkinson's but usually falls on his buttocks. He was found to have a left hip fracture and underwent Left hip replacement with Dr. Diaz. Post operatively he had orthostatic hypotension which self resolved. His hospital course was uneventful and he was evaluated by PT and OT who recommended acute rehab. He was admitted to [facility] on 3/1/24.   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 Can cover with any dressing if discharge.     ------------------------------------------------------  #Parkinson's related motor symptoms bradykinesia, falls, rigidity, tremors  -Continue Sinemet 25/100mg 1 tab at 8am, 12pm, 4pm and 8pm  -Movement disorders following    --------------------------------------------------------  Parkinson's related non-motor symptoms    #Orthostatic hypotension  - Convulsive Syncopal event on 3/6 -self resolved when lying flat   - Completed 500 cc bolus  - basic labs, mag, Phosp, troponin EKG, UA 3/8 positive for P. Vulgaris normosensitive, on Bactrim since 3/8, remission of symptoms   -Increased midodrine to 7.5mg BID- 3/11    #Pain control  - Tylenol PRN  - Tramadol PRN  - Lidocaine patch    #GI/Bowel Management/Constipation  - Continue Senna at bedtime   - Miralax daily 3/4  - Last BM 3/7    #Bladder management  - Continue to monitor PVR q 8 hours (SC if > 400)  - Monitor UO    #UTI  - 3/8 preliminary culture positive for P. Vulgaris, on Bactrim from 3/8 to 3/12, on Vantin 200 mg BID since 3/12, recommended duration of treatment 14 days.     #RBD   - Melatonin 3 mg on 3/11, 6 mg on 3/12 - very good response to 6 mg    ----------------------------------------------------------  Concurrent medical problems    #DVT Prophylaxis  - Lovenox  - TEDs     #Skin:  - Left hip incision with staples in place.     FEN   - Diet - Regular with thins  - Dysphagia  SLP - evaluation and treatment    Precautions / PROPHYLAXIS:   - Falls  - ortho: Weight bearing status: WBAT with hip precautions   - Lungs: Incentive Spirometer   - Pressure injury/Skin: OOB to Chair, PT/OT        Follow up     Sonido Alvarez  Family Medicine  [address] [place] [address]  Phone: [phone]  Fax: [phone]  Follow Up Time:     Adonis Diaz  Orthopaedic Surgery  [address] [place] [address], [address]  [place] [address]  Phone: [phone]  Fax: [phone]

## 2024-03-13 NOTE — CHART NOTE - NSCHARTNOTEFT_GEN_A_CORE
NUTRITION FOLLOW UP    SOURCE: Patient [X]   Family [ ]    Medical Record [X]    DIET: Diet, Regular:   Halal (03-13-24 @ 10:19) [Active]  Ensure plus HP BID  ALLERGIES: NKFA    IMPRESSION:  Met with pt this AM at bedside. Pt was able to articulate his nutrition hx well. Pt reported good appetite and taking adequate POs, with good acceptance to Ensure plus HP BID. Pt denied N/V/D, however mentioned constipation, stated last BM 3/11 (pt with ostomy bag). RD encouraged adequate fluids + fiber to assist with BMs - pt was accepting to RD suggestion.     PATIENT REPORT: [X] constipation     PO INTAKE: % per RN flowsheets    CURRENT WEIGHT: 185.4 lbs (3/1)  WEIGHT HX: N/A    PERTINENT MEDS:   Pertinent Medications: MEDICATIONS  (STANDING):  carbidopa/levodopa  25/100 1 Tablet(s) Oral <User Schedule>  cefpodoxime 200 milliGRAM(s) Oral two times a day  enoxaparin Injectable 40 milliGRAM(s) SubCutaneous every 24 hours  lactobacillus acidophilus 1 Tablet(s) Oral two times a day with meals  lidocaine   4% Patch 1 Patch Transdermal daily  melatonin 6 milliGRAM(s) Oral at bedtime  midodrine. 7.5 milliGRAM(s) Oral <User Schedule>  polyethylene glycol 3350 17 Gram(s) Oral daily  senna 2 Tablet(s) Oral at bedtime  zinc oxide 20% Ointment 1 Application(s) Topical two times a day    MEDICATIONS  (PRN):  acetaminophen     Tablet .. 650 milliGRAM(s) Oral every 6 hours PRN Mild Pain (1 - 3)  aluminum hydroxide/magnesium hydroxide/simethicone Suspension 30 milliLiter(s) Oral every 4 hours PRN Dyspepsia    PERTINENT LABS:  03-11 Na142 mmol/L Glu 96 mg/dL K+ 4.4 mmol/L Cr  1.19 mg/dL BUN 28 mg/dL<H> 03-11 Alb 2.9 g/dL<L>    SKIN: no pressure injury per RN flowsheets    EDEMA: 1+ L + R foot edema per RN flowsheets    ESTIMATED NEEDS:   [X] no change since previous assessment     PREVIOUS NUTRITION DIAGNOSIS:  Increased nutrient needs    NUTRITION DIAGNOSIS is: [X] Ongoing  - on Ensure Plus High Protein (provides 350 kcal, 20 g protein/serving) BID    NEW NUTRITION DIAGNOSIS: N/A    MONITORING AND EVALUATION:   Current diet order is appropriate and is well tolerated, will continue to monitor:  - Food and nutrient intake/POs  - Nutrition related lab values  - Weight/weight trends  - GI functions  - Supplement acceptance    NUTRITION RECOMMENDATIONS:   1. Continue with current diet  - Encourage adequate PO and protein intake  - Honor food preferences  - Appreciate continued PO intake % documentation in RN flowsheets  2. Continue with Ensure plus HP BID, as tolerated  3. Appreciate updated weight and weekly weight trends  4. Continue with current bowel regimen, prn    Colette Kemp RDN also available via TEAMS

## 2024-03-13 NOTE — PROGRESS NOTE ADULT - SUBJECTIVE AND OBJECTIVE BOX
Patient in bed, a little sleepy this morning. Very good sleep overnight on current dose of melatonin. Yesterday patient appeared increasingly confused and groggy during the day. Hospitalist reviewed urine culture and antibiogram and recommended discontinuing Bactrim and start cephalosporin treatment. IVF and IV ceftriaxone were initially ordered, but were not administered due to wife's preference despite multiple conversations with nursing staff and on-call resident. Patient is now on Vantin 200 mg BID orally. Patient is eating well per family and they would like to discontinue Ensure.     HPI:  77 year old male of Parkinson's disease since 2022 and follows with Dr. Ortega, prior colon CA w/ ostomy, prior prostate CA s/p radiation cystitis, blurry vision 2/2 cataracts baseline, who presented to ED at [facility] on 2/27 s/p mechanical fall at home. Per wife patient ambulated without devise in the house, and has had multiple falls due to poor balance from Parkinson's, but usually falls on his buttocks. He was found to have a left hip fracture and underwent Left hip replacement with Dr. Diaz. Post operatively he had orthostatic hypotension which self resolved. His hospital course was uneventful and he was evaluated by PT and OT who recommended acute rehab. He was admitted to [facility] on 3/1/24.      Vital Signs Last 24 Hrs  T(C): 36.4 (13 Mar 2024 08:22), Max: 36.6 (12 Mar 2024 20:07)  T(F): 97.5 (13 Mar 2024 08:22), Max: 97.9 (12 Mar 2024 20:07)  HR: 67 (13 Mar 2024 08:22) (67 - 72)  BP: 109/61 (13 Mar 2024 08:22) (109/61 - 154/72)  BP(mean): --  RR: 16 (13 Mar 2024 08:22) (16 - 16)  SpO2: 96% (13 Mar 2024 08:22) (95% - 96%)    Parameters below as of 13 Mar 2024 08:22  Patient On (Oxygen Delivery Method): room air    PHYSICAL EXAM  Constitutional - NAD, Comfortable  HEENT - NCAT  Chest - Breathing comfortably in room air   Extremities - no edema   Neurologic Exam - declines due to sleepiness (will follow up shortly).     MEDICATIONS  (STANDING):  carbidopa/levodopa  25/100 1 Tablet(s) Oral <User Schedule>  cefpodoxime 200 milliGRAM(s) Oral two times a day  enoxaparin Injectable 40 milliGRAM(s) SubCutaneous every 24 hours  lactobacillus acidophilus 1 Tablet(s) Oral two times a day with meals  lidocaine   4% Patch 1 Patch Transdermal daily  melatonin 6 milliGRAM(s) Oral at bedtime  midodrine. 7.5 milliGRAM(s) Oral <User Schedule>  polyethylene glycol 3350 17 Gram(s) Oral daily  senna 2 Tablet(s) Oral at bedtime  zinc oxide 20% Ointment 1 Application(s) Topical two times a day    MEDICATIONS  (PRN):  acetaminophen     Tablet .. 650 milliGRAM(s) Oral every 6 hours PRN Mild Pain (1 - 3)  aluminum hydroxide/magnesium hydroxide/simethicone Suspension 30 milliLiter(s) Oral every 4 hours PRN Dyspepsia   Patient in bed, a little sleepy this morning. Very good sleep overnight on current dose of melatonin. Yesterday patient appeared increasingly confused and groggy during the day. Hospitalist reviewed urine culture and antibiogram and recommended discontinuing Bactrim and start cephalosporin treatment. IVF and IV ceftriaxone were initially ordered, but were not administered due to wife's preference despite multiple conversations with nursing staff and on-call resident. Patient is now on Vantin 200 mg BID orally. Patient is eating well per family and they would like to discontinue Ensure. Orthopedic consult yesterday: removed dressing, staples to be removed at follow up as outpatient.     HPI:  77 year old male of Parkinson's disease since 2022 and follows with Dr. Ortega, prior colon CA w/ ostomy, prior prostate CA s/p radiation cystitis, blurry vision 2/2 cataracts baseline, who presented to ED at [facility] on 2/27 s/p mechanical fall at home. Per wife patient ambulated without devise in the house, and has had multiple falls due to poor balance from Parkinson's, but usually falls on his buttocks. He was found to have a left hip fracture and underwent Left hip replacement with Dr. Diaz. Post operatively he had orthostatic hypotension which self resolved. His hospital course was uneventful and he was evaluated by PT and OT who recommended acute rehab. He was admitted to [facility] on 3/1/24.      Vital Signs Last 24 Hrs  T(C): 36.4 (13 Mar 2024 08:22), Max: 36.6 (12 Mar 2024 20:07)  T(F): 97.5 (13 Mar 2024 08:22), Max: 97.9 (12 Mar 2024 20:07)  HR: 67 (13 Mar 2024 08:22) (67 - 72)  BP: 109/61 (13 Mar 2024 08:22) (109/61 - 154/72)  BP(mean): --  RR: 16 (13 Mar 2024 08:22) (16 - 16)  SpO2: 96% (13 Mar 2024 08:22) (95% - 96%)    Parameters below as of 13 Mar 2024 08:22  Patient On (Oxygen Delivery Method): room air    PHYSICAL EXAM  Constitutional - NAD, Comfortable  HEENT - NCAT  Chest - Breathing comfortably in room air   Extremities - no edema   Neurologic Exam - declines due to sleepiness (will follow up shortly).     MEDICATIONS  (STANDING):  carbidopa/levodopa  25/100 1 Tablet(s) Oral <User Schedule>  cefpodoxime 200 milliGRAM(s) Oral two times a day  enoxaparin Injectable 40 milliGRAM(s) SubCutaneous every 24 hours  lactobacillus acidophilus 1 Tablet(s) Oral two times a day with meals  lidocaine   4% Patch 1 Patch Transdermal daily  melatonin 6 milliGRAM(s) Oral at bedtime  midodrine. 7.5 milliGRAM(s) Oral <User Schedule>  polyethylene glycol 3350 17 Gram(s) Oral daily  senna 2 Tablet(s) Oral at bedtime  zinc oxide 20% Ointment 1 Application(s) Topical two times a day    MEDICATIONS  (PRN):  acetaminophen     Tablet .. 650 milliGRAM(s) Oral every 6 hours PRN Mild Pain (1 - 3)  aluminum hydroxide/magnesium hydroxide/simethicone Suspension 30 milliLiter(s) Oral every 4 hours PRN Dyspepsia   Patient in bed, a little sleepy this morning. Very good sleep overnight on current dose of melatonin. More alert and cooperative in the late morning, doing better overall. Yesterday patient appeared increasingly confused and groggy during the day. Hospitalist reviewed urine culture and antibiogram and recommended discontinuing Bactrim and start cephalosporin treatment. IVF and IV ceftriaxone were initially ordered, but were not administered due to wife's preference despite multiple conversations with nursing staff and on-call resident. Patient is now on Vantin 200 mg BID orally. He is eating well, we will discontinue Ensure. Orthopedic consult yesterday: removed dressing, staples to be removed at follow up as outpatient. Case discussed at team meeting: performance of patient in therapy variable depending on cognition/mental status fluctuations during the day, family training today for expected discharge date on Friday 3/15.     HPI:  77 year old male of Parkinson's disease since 2022 and follows with Dr. Ortega, prior colon CA w/ ostomy, prior prostate CA s/p radiation cystitis, blurry vision 2/2 cataracts baseline, who presented to ED at [facility] on 2/27 s/p mechanical fall at home. Per wife patient ambulated without devise in the house, and has had multiple falls due to poor balance from Parkinson's, but usually falls on his buttocks. He was found to have a left hip fracture and underwent Left hip replacement with Dr. Diaz. Post operatively he had orthostatic hypotension which self resolved. His hospital course was uneventful and he was evaluated by PT and OT who recommended acute rehab. He was admitted to [facility] on 3/1/24.      Vital Signs Last 24 Hrs  T(C): 36.4 (13 Mar 2024 08:22), Max: 36.6 (12 Mar 2024 20:07)  T(F): 97.5 (13 Mar 2024 08:22), Max: 97.9 (12 Mar 2024 20:07)  HR: 67 (13 Mar 2024 08:22) (67 - 72)  BP: 109/61 (13 Mar 2024 08:22) (109/61 - 154/72)  BP(mean): --  RR: 16 (13 Mar 2024 08:22) (16 - 16)  SpO2: 96% (13 Mar 2024 08:22) (95% - 96%)    Parameters below as of 13 Mar 2024 08:22  Patient On (Oxygen Delivery Method): room air    PHYSICAL EXAM  Constitutional - NAD, Comfortable  HEENT - NCAT  Chest - Breathing comfortably in room air   Extremities - no edema   Neurologic Exam - patient examined after lunch, sitting comfortably in the chair, moderate intermittent tremor of bilateral UEs worse on the left, occasional myoclonic jerks at UEs. No stimulus-induced myoclonus. Moderate hypomimia with lips parted most of the time and staring expression. EOMI with mild limitation of vertical gaze. Disoriented in time, oriented in space and situation. Recall of date only very partial at 2 min. Moderate bradykinesia worse on the left. Moderate rigidity throughout, worse on the left, with preserved ROM. Able to stand from the chair slowly by pushing on the arms, stands unassisted for several seconds before letting himself fall back in the chair due to fatigue/retropulsion. Gait deferred.        MEDICATIONS  (STANDING):  carbidopa/levodopa  25/100 1 Tablet(s) Oral <User Schedule>  cefpodoxime 200 milliGRAM(s) Oral two times a day  enoxaparin Injectable 40 milliGRAM(s) SubCutaneous every 24 hours  lactobacillus acidophilus 1 Tablet(s) Oral two times a day with meals  lidocaine   4% Patch 1 Patch Transdermal daily  melatonin 6 milliGRAM(s) Oral at bedtime  midodrine. 7.5 milliGRAM(s) Oral <User Schedule>  polyethylene glycol 3350 17 Gram(s) Oral daily  senna 2 Tablet(s) Oral at bedtime  zinc oxide 20% Ointment 1 Application(s) Topical two times a day    MEDICATIONS  (PRN):  acetaminophen     Tablet .. 650 milliGRAM(s) Oral every 6 hours PRN Mild Pain (1 - 3)  aluminum hydroxide/magnesium hydroxide/simethicone Suspension 30 milliLiter(s) Oral every 4 hours PRN Dyspepsia

## 2024-03-14 ENCOUNTER — TRANSCRIPTION ENCOUNTER (OUTPATIENT)
Age: 79
End: 2024-03-14

## 2024-03-14 LAB
ALBUMIN SERPL ELPH-MCNC: 3.3 G/DL — SIGNIFICANT CHANGE UP (ref 3.3–5)
ALP SERPL-CCNC: 74 U/L — SIGNIFICANT CHANGE UP (ref 40–120)
ALT FLD-CCNC: 21 U/L — SIGNIFICANT CHANGE UP (ref 10–45)
ANION GAP SERPL CALC-SCNC: 9 MMOL/L — SIGNIFICANT CHANGE UP (ref 5–17)
AST SERPL-CCNC: 35 U/L — SIGNIFICANT CHANGE UP (ref 10–40)
BASOPHILS # BLD AUTO: 0.05 K/UL — SIGNIFICANT CHANGE UP (ref 0–0.2)
BASOPHILS NFR BLD AUTO: 0.8 % — SIGNIFICANT CHANGE UP (ref 0–2)
BILIRUB SERPL-MCNC: 0.6 MG/DL — SIGNIFICANT CHANGE UP (ref 0.2–1.2)
BUN SERPL-MCNC: 26 MG/DL — HIGH (ref 7–23)
CALCIUM SERPL-MCNC: 9.6 MG/DL — SIGNIFICANT CHANGE UP (ref 8.4–10.5)
CHLORIDE SERPL-SCNC: 104 MMOL/L — SIGNIFICANT CHANGE UP (ref 96–108)
CO2 SERPL-SCNC: 27 MMOL/L — SIGNIFICANT CHANGE UP (ref 22–31)
CREAT SERPL-MCNC: 1.07 MG/DL — SIGNIFICANT CHANGE UP (ref 0.5–1.3)
EGFR: 71 ML/MIN/1.73M2 — SIGNIFICANT CHANGE UP
EOSINOPHIL # BLD AUTO: 0.3 K/UL — SIGNIFICANT CHANGE UP (ref 0–0.5)
EOSINOPHIL NFR BLD AUTO: 4.9 % — SIGNIFICANT CHANGE UP (ref 0–6)
GLUCOSE SERPL-MCNC: 88 MG/DL — SIGNIFICANT CHANGE UP (ref 70–99)
HCT VFR BLD CALC: 38.9 % — LOW (ref 39–50)
HGB BLD-MCNC: 12.2 G/DL — LOW (ref 13–17)
IMM GRANULOCYTES NFR BLD AUTO: 0.7 % — SIGNIFICANT CHANGE UP (ref 0–0.9)
LYMPHOCYTES # BLD AUTO: 1.24 K/UL — SIGNIFICANT CHANGE UP (ref 1–3.3)
LYMPHOCYTES # BLD AUTO: 20.2 % — SIGNIFICANT CHANGE UP (ref 13–44)
MCHC RBC-ENTMCNC: 28.1 PG — SIGNIFICANT CHANGE UP (ref 27–34)
MCHC RBC-ENTMCNC: 31.4 GM/DL — LOW (ref 32–36)
MCV RBC AUTO: 89.6 FL — SIGNIFICANT CHANGE UP (ref 80–100)
MONOCYTES # BLD AUTO: 0.48 K/UL — SIGNIFICANT CHANGE UP (ref 0–0.9)
MONOCYTES NFR BLD AUTO: 7.8 % — SIGNIFICANT CHANGE UP (ref 2–14)
NEUTROPHILS # BLD AUTO: 4.04 K/UL — SIGNIFICANT CHANGE UP (ref 1.8–7.4)
NEUTROPHILS NFR BLD AUTO: 65.6 % — SIGNIFICANT CHANGE UP (ref 43–77)
NRBC # BLD: 0 /100 WBCS — SIGNIFICANT CHANGE UP (ref 0–0)
PLATELET # BLD AUTO: 352 K/UL — SIGNIFICANT CHANGE UP (ref 150–400)
POTASSIUM SERPL-MCNC: 5 MMOL/L — SIGNIFICANT CHANGE UP (ref 3.5–5.3)
POTASSIUM SERPL-SCNC: 5 MMOL/L — SIGNIFICANT CHANGE UP (ref 3.5–5.3)
PROT SERPL-MCNC: 7.5 G/DL — SIGNIFICANT CHANGE UP (ref 6–8.3)
RBC # BLD: 4.34 M/UL — SIGNIFICANT CHANGE UP (ref 4.2–5.8)
RBC # FLD: 14.2 % — SIGNIFICANT CHANGE UP (ref 10.3–14.5)
SODIUM SERPL-SCNC: 140 MMOL/L — SIGNIFICANT CHANGE UP (ref 135–145)
WBC # BLD: 6.15 K/UL — SIGNIFICANT CHANGE UP (ref 3.8–10.5)
WBC # FLD AUTO: 6.15 K/UL — SIGNIFICANT CHANGE UP (ref 3.8–10.5)

## 2024-03-14 PROCEDURE — 99232 SBSQ HOSP IP/OBS MODERATE 35: CPT

## 2024-03-14 RX ORDER — ACETAMINOPHEN 500 MG
2 TABLET ORAL
Qty: 0 | Refills: 0 | DISCHARGE
Start: 2024-03-14

## 2024-03-14 RX ORDER — LACTOBACILLUS ACIDOPHILUS 100MM CELL
1 CAPSULE ORAL
Qty: 0 | Refills: 0 | DISCHARGE
Start: 2024-03-14

## 2024-03-14 RX ORDER — LANOLIN ALCOHOL/MO/W.PET/CERES
2 CREAM (GRAM) TOPICAL
Qty: 0 | Refills: 0 | DISCHARGE
Start: 2024-03-14

## 2024-03-14 RX ORDER — CEFPODOXIME PROXETIL 100 MG
1 TABLET ORAL
Qty: 14 | Refills: 0
Start: 2024-03-14 | End: 2024-03-20

## 2024-03-14 RX ORDER — LIDOCAINE 4 G/100G
1 CREAM TOPICAL
Qty: 0 | Refills: 0 | DISCHARGE
Start: 2024-03-14

## 2024-03-14 RX ORDER — ZINC OXIDE 200 MG/G
1 OINTMENT TOPICAL
Qty: 0 | Refills: 0 | DISCHARGE
Start: 2024-03-14

## 2024-03-14 RX ORDER — MIDODRINE HYDROCHLORIDE 2.5 MG/1
3 TABLET ORAL
Qty: 180 | Refills: 0
Start: 2024-03-14 | End: 2024-04-12

## 2024-03-14 RX ORDER — CARBIDOPA AND LEVODOPA 25; 100 MG/1; MG/1
1 TABLET ORAL
Qty: 120 | Refills: 0
Start: 2024-03-14 | End: 2024-04-12

## 2024-03-14 RX ORDER — POLYETHYLENE GLYCOL 3350 17 G/17G
17 POWDER, FOR SOLUTION ORAL
Qty: 0 | Refills: 0 | DISCHARGE
Start: 2024-03-14

## 2024-03-14 RX ADMIN — CARBIDOPA AND LEVODOPA 1 TABLET(S): 25; 100 TABLET ORAL at 20:12

## 2024-03-14 RX ADMIN — SENNA PLUS 2 TABLET(S): 8.6 TABLET ORAL at 20:12

## 2024-03-14 RX ADMIN — CARBIDOPA AND LEVODOPA 1 TABLET(S): 25; 100 TABLET ORAL at 07:56

## 2024-03-14 RX ADMIN — Medication 1 TABLET(S): at 17:45

## 2024-03-14 RX ADMIN — ENOXAPARIN SODIUM 40 MILLIGRAM(S): 100 INJECTION SUBCUTANEOUS at 06:09

## 2024-03-14 RX ADMIN — CARBIDOPA AND LEVODOPA 1 TABLET(S): 25; 100 TABLET ORAL at 15:58

## 2024-03-14 RX ADMIN — Medication 6 MILLIGRAM(S): at 20:12

## 2024-03-14 RX ADMIN — MIDODRINE HYDROCHLORIDE 7.5 MILLIGRAM(S): 2.5 TABLET ORAL at 06:10

## 2024-03-14 RX ADMIN — Medication 1 TABLET(S): at 07:56

## 2024-03-14 RX ADMIN — Medication 200 MILLIGRAM(S): at 17:45

## 2024-03-14 RX ADMIN — Medication 200 MILLIGRAM(S): at 06:09

## 2024-03-14 NOTE — PROGRESS NOTE ADULT - ASSESSMENT
78 y/o man of Parkinson's disease since 2022 and follows with Dr. Ortega, prior colon CA w/ ostomy, prior prostate CA s/p radiation cystitis, blurry vision 2/2 cataracts baseline, who presented to ED at [facility] on 2/27 s/p mechanical fall at home. Per wife patient ambulated without devise in the house, and has had multiple falls due to poor balance from Parkinson's but usually falls on his buttocks. He was found to have a left hip fracture and underwent Left hip replacement with Dr. Diaz. Post operatively he had orthostatic hypotension which self resolved. His hospital course was uneventful and he was evaluated by PT and OT who recommended acute rehab. He was admitted to [facility] on 3/1/24- pt/ot/dvt ppx      Left hip fracture s/p hip replacement    -s/p fall,  -comprehensive rehab program  -Continue Lovenox for DVT prophylaxis and then switch to aspirin 81mg BID for total of 6 weeks per ortho    UTI  - UA positive, UCx positive for P. Valgarus  - s/p Bactrim  - c/w  Vantin 200mg BID. last day 3/22    Parkinson's  bradykinesia, falls, rigidity, tremors  -Continue Sinemet   - Tolerating comprehensive rehab    Orthostatic hypotension likely related to PD   - midodrine 5mg increased to 7.5mg BID on 3/11    Constipation  - Laxatives per rehab team  - encouraged PO    DVT Prophylaxis- Lovenox    d/w rehab team at [facility][note 3]

## 2024-03-14 NOTE — DISCHARGE NOTE PROVIDER - NSDCFUSCHEDAPPT_GEN_ALL_CORE_FT
Ray Ortega  [facility]  NEUROLOGY [address] [facility]  Scheduled Appointment: 04/10/2024

## 2024-03-14 NOTE — DISCHARGE NOTE PROVIDER - CARE PROVIDER_API CALL
Sonido Alvarez  [facility]  [address] [place] [address]  Phone: [phone]  Fax: [phone]  Follow Up Time: 1 week    Adonis Diaz  Orthopaedic Surgery  [address] [facility] [address]  [place] [address]  Phone: [phone]  Fax: [phone]  Follow Up Time: 1 week    Ray Ortega  Neurology  [address] [facility] [address]  [place] [address]  Phone: [phone]  Fax: [phone]  Follow Up Time: 1 month

## 2024-03-14 NOTE — PROGRESS NOTE ADULT - SUBJECTIVE AND OBJECTIVE BOX
Medicine Progress Note    Patient is a 78y old  Male who presents with a chief complaint of Left hip replacement (14 Mar 2024 12:00)      SUBJECTIVE / OVERNIGHT EVENTS:  seen and examined  Chart reviewed  No overnight events  Limb weakness improving with therapy  BM 3 days back  No pain  No complaints    ADDITIONAL REVIEW OF SYSTEMS:  denied fever/chills/CP/SOB/cough/palpitation/dizziness/abdominal pian/nausea/vomiting/diarrhoea/dysuria/leg or calf pain/headaches.all other ROS neg    MEDICATIONS  (STANDING):  carbidopa/levodopa  25/100 1 Tablet(s) Oral <User Schedule>  cefpodoxime 200 milliGRAM(s) Oral two times a day  enoxaparin Injectable 40 milliGRAM(s) SubCutaneous every 24 hours  lactobacillus acidophilus 1 Tablet(s) Oral two times a day with meals  lidocaine   4% Patch 1 Patch Transdermal daily  melatonin 6 milliGRAM(s) Oral at bedtime  midodrine. 7.5 milliGRAM(s) Oral <User Schedule>  polyethylene glycol 3350 17 Gram(s) Oral daily  senna 2 Tablet(s) Oral at bedtime  zinc oxide 20% Ointment 1 Application(s) Topical two times a day    MEDICATIONS  (PRN):  acetaminophen     Tablet .. 650 milliGRAM(s) Oral every 6 hours PRN Mild Pain (1 - 3)  aluminum hydroxide/magnesium hydroxide/simethicone Suspension 30 milliLiter(s) Oral every 4 hours PRN Dyspepsia    CAPILLARY BLOOD GLUCOSE        I&O's Summary      PHYSICAL EXAM:  Vital Signs Last 24 Hrs  T(C): 36.5 (14 Mar 2024 07:55), Max: 36.8 (13 Mar 2024 19:00)  T(F): 97.7 (14 Mar 2024 07:55), Max: 98.3 (13 Mar 2024 19:00)  HR: 58 (14 Mar 2024 07:55) (58 - 69)  BP: 146/65 (14 Mar 2024 07:55) (110/69 - 146/65)  BP(mean): --  RR: 16 (14 Mar 2024 07:55) (16 - 16)  SpO2: 97% (14 Mar 2024 07:55) (95% - 97%)    Parameters below as of 14 Mar 2024 07:55  Patient On (Oxygen Delivery Method): room air    GENERAL: Not in distress. Alert    HEENT: clear conjuctiva, MMM. no pallor or icterus  CARDIOVASCULAR: RRR S1, S2. No murmur/rubs/gallop  LUNGS: BLAE+, no rales, no wheezing, no rhonchi.    ABDOMEN: ND. Soft,  NT, no guarding / rebound / rigidity. BS normoactive  BACK: No spine tenderness.  EXTREMITIES: no edema. no leg or calf TP.  SKIN: warm and dry  PSYCHIATRIC: Calm.  No agitation.  CNS: AAO*2. moves limbs, follows commands. tremor. bradykinesia, rigidity.,      LABS:                        12.2   6.15  )-----------( 352      ( 14 Mar 2024 06:22 )             38.9     03-14    140  |  104  |  26<H>  ----------------------------<  88  5.0   |  27  |  1.07    Ca    9.6      14 Mar 2024 06:22    TPro  7.5  /  Alb  3.3  /  TBili  0.6  /  DBili  x   /  AST  35  /  ALT  21  /  AlkPhos  74  03-14      Urinalysis Basic - ( 14 Mar 2024 06:22 )    Color: x / Appearance: x / SG: x / pH: x  Gluc: 88 mg/dL / Ketone: x  / Bili: x / Urobili: x   Blood: x / Protein: x / Nitrite: x   Leuk Esterase: x / RBC: x / WBC x   Sq Epi: x / Non Sq Epi: x / Bacteria: x    RADIOLOGY & ADDITIONAL TESTS:  Imaging from Last 24 Hours:    Electrocardiogram/QTc Interval:    COORDINATION OF CARE:  Care Discussed with Consultants/Other Providers:

## 2024-03-14 NOTE — DISCHARGE NOTE PROVIDER - PROVIDER TOKENS
PROVIDER:[TOKEN:[[id number]],FOLLOWUP:[1 week]],PROVIDER:[TOKEN:[[id number]],FOLLOWUP:[1 week]],PROVIDER:[TOKEN:[[id number]],FOLLOWUP:[1 month]]

## 2024-03-14 NOTE — DISCHARGE NOTE NURSING/CASE MANAGEMENT/SOCIAL WORK - PATIENT PORTAL LINK FT
You can access the FollowMyHealth Patient Portal offered by [facility] by registering at the following website: [email or web address]. By joining [facility]’s FollowMyHealth portal, you will also be able to view your health information using other applications (apps) compatible with our system.

## 2024-03-14 NOTE — DISCHARGE NOTE PROVIDER - DISCHARGE SERVICE FOR PATIENT
Addended by: DANIELA MELGAR on: 6/26/2017 09:38 AM     Modules accepted: Stewart Carranza     on the discharge service for the patient. I have reviewed and made amendments to the documentation where necessary.

## 2024-03-14 NOTE — DISCHARGE NOTE PROVIDER - NSDCCPCAREPLAN_GEN_ALL_CORE_FT
PRINCIPAL DISCHARGE DIAGNOSIS  Diagnosis: History of left hip replacement  Assessment and Plan of Treatment:       SECONDARY DISCHARGE DIAGNOSES  Diagnosis: Parkinsons  Assessment and Plan of Treatment:     Diagnosis: Orthostatic hypotension  Assessment and Plan of Treatment:     Diagnosis: Sleep behavior disorder, REM  Assessment and Plan of Treatment:     Diagnosis: Constipation  Assessment and Plan of Treatment:     Diagnosis: UTI (urinary tract infection)  Assessment and Plan of Treatment:      PRINCIPAL DISCHARGE DIAGNOSIS  Diagnosis: History of left hip replacement  Assessment and Plan of Treatment: - Continue Lovenox for DVT prophylaxis and then switch to aspirin 81mg BID for total of 6 weeks  - Continue using the abduction pillow in bed   - Continue weight bearing as tolerated  - You can take use a Lidocaine patch for pain if needed, or take Tylenol 975 mg 1 tab up to 3 times daily if needed  - Follow up with Orthopedic office for staples removal         SECONDARY DISCHARGE DIAGNOSES  Diagnosis: Parkinsons  Assessment and Plan of Treatment: -Continue Sinemet 25/100mg 1 tab at 8am, 12pm, 4pm and 8pm  -Follow up with Movement Disorder Neurologist (Dr. Ortega)    Diagnosis: Orthostatic hypotension  Assessment and Plan of Treatment: - During your hospital stay you had two events of syncope (loss of consciousness due to low blood pressure)   - Drink plenty of fluids, use salt liberally in your diet   - Continue midodrine 7.5mg 2 times daily  - Monitor your blood pressure values when standing up and follow up with your PCP and/or Movement Disorder Specialist in case of variations    Diagnosis: Sleep behavior disorder, REM  Assessment and Plan of Treatment: - Continue melatonin 6mg at bedtime    Diagnosis: Constipation  Assessment and Plan of Treatment: - Continue Senna at bedtime   - Continue Miralax daily   - Drink plenty of fluids    Diagnosis: UTI (urinary tract infection)  Assessment and Plan of Treatment: - Urine culture performed on 03/08/2024 was positive for P. Vulgaris  - You received antibiotic treatment with oral Bactrim from 03/08 to 03/12/2024  - You received antibiotic treatment with Vantin 200 mg 2 times daily  since 03/12/2014 until discharge  - Continue taking Vantin 200 mg 2 times daily at home until 03/26/2024     PRINCIPAL DISCHARGE DIAGNOSIS  Diagnosis: History of left hip replacement  Assessment and Plan of Treatment: - Continue Lovenox for DVT prophylaxis and then switch to aspirin 81mg BID for total of 6 weeks  - Continue using the abduction pillow in bed   - Continue weight bearing as tolerated  - You can take use a Lidocaine patch for pain if needed, or take Tylenol 975 mg 1 tab up to 3 times daily if needed  - Follow up in a week with Orthopaedic office for staples removal   - Continue with physical therapy at home         SECONDARY DISCHARGE DIAGNOSES  Diagnosis: Parkinsons  Assessment and Plan of Treatment: -Continue Sinemet 25/100mg 1 tab at 8am, 12pm, 4pm and 8pm  -Follow up with Movement Disorder Neurologist (Dr. Ortega)  -Continue physical/occupational/speech therapy at home    Diagnosis: Orthostatic hypotension  Assessment and Plan of Treatment: - During your hospital stay you had two events of syncope (loss of consciousness due to low blood pressure)   - Drink plenty of fluids, use salt liberally in your diet   - Continue midodrine 7.5mg 2 times daily  - Monitor your blood pressure values when standing up and follow up with your PCP and/or Movement Disorder Specialist in case of variations    Diagnosis: Sleep behavior disorder, REM  Assessment and Plan of Treatment: - Continue melatonin 6mg at bedtime    Diagnosis: Constipation  Assessment and Plan of Treatment: - Continue Senna at bedtime   - Continue Miralax daily   - Drink plenty of fluids    Diagnosis: UTI (urinary tract infection)  Assessment and Plan of Treatment: - Urine culture performed on 03/08/2024 was positive for P. Vulgaris  - You received antibiotic treatment with oral Bactrim from 03/08 to 03/12/2024  - You received antibiotic treatment with oral Vantin 200 mg 2 times daily  since 03/12/2014 until discharge  - Continue taking oral Vantin 200 mg 2 times daily at home until 03/26/2024     PRINCIPAL DISCHARGE DIAGNOSIS  Diagnosis: History of left hip replacement  Assessment and Plan of Treatment: - Continue aspirin 81mg BID for total of 6 weeks -until 4/9   - Continue using the abduction pillow in bed   - Continue weight bearing as tolerated  - You can take use a Lidocaine patch for pain if needed, or take Tylenol 975 mg 1 tab up to 3 times daily if needed  - Follow up in a week with Orthopaedic office for staples removal   - Continue with physical therapy at home         SECONDARY DISCHARGE DIAGNOSES  Diagnosis: Parkinsons  Assessment and Plan of Treatment: -Continue Sinemet 25/100mg 1 tab at 8am, 12pm, 4pm and 8pm  -Follow up with Movement Disorder Neurologist (Dr. Ortega)  -Continue physical/occupational/speech therapy at home    Diagnosis: Orthostatic hypotension  Assessment and Plan of Treatment: - During your hospital stay you had two events of syncope (loss of consciousness due to low blood pressure)   - Drink plenty of fluids, use salt liberally in your diet   - Continue midodrine 7.5mg 2 times daily  - Monitor your blood pressure values when standing up and follow up with your PCP and/or Movement Disorder Specialist in case of variations    Diagnosis: Sleep behavior disorder, REM  Assessment and Plan of Treatment: - Continue melatonin 6mg at bedtime    Diagnosis: Constipation  Assessment and Plan of Treatment: - Continue Senna at bedtime   - Continue Miralax daily   - Drink plenty of fluids    Diagnosis: UTI (urinary tract infection)  Assessment and Plan of Treatment: - Urine culture performed on 03/08/2024 was positive for P. Vulgaris  - You received antibiotic treatment with oral Bactrim from 03/08 to 03/12/2024  - You received antibiotic treatment with oral Vantin 200 mg 2 times daily  since 03/12/2014 until discharge  - Continue taking oral Vantin 200 mg 2 times daily at home until 03/26/2024

## 2024-03-14 NOTE — DISCHARGE NOTE PROVIDER - CARE PROVIDERS DIRECT ADDRESSES
,[email or web address]

## 2024-03-14 NOTE — DISCHARGE NOTE PROVIDER - HOSPITAL COURSE
HPI:  77 year old male of Parkinsons disease since 2022 and follows with Dr. Ortega, prior colon CA w/ ostomy, prior prostate CA s/p radiation cystitis, blurry vision 2/2 cataracts baseline, who presented to ED at [facility] on 2/27 s/p mechanical fall at home. Per wife patient ambulated without devise in the house, and has had multiple falls due to poor balance from Parkinsons, but usually falls on his buttocks. He was found to have a left hip fracture and underwent Left hip replacement with Dr. Diaz. Post operatively he had orthostatic hypotension which self resolved. His hospital course was uneventful and he was evaluated by PT and OT who recommended acute rehab. He was admitted to [facility] on 3/1/24.       Rehab course significant for adjustments to medication for orthostatic hypotension and sleep with good improvement. All other medical co-morbidites were stable.    Pt tolerated course of inpatient pt/ot/slp rehab with significant functional improvements and met rehab goals prior to discharge.     Pt was medically cleared on 3/15/24 for discharge to home with home care.        HPI:  Mr. Kaiden Rodriguez is a 77 year old male with a history of Parkinson's disease since 2022, following with Dr. Ortega. His medical history is also notable for prior colon CA w/ ostomy, prior prostate CA s/p radiation cystitis, blurry vision 2/2 cataracts baseline. He presented to ED at [facility] on 2/27 s/p mechanical fall at home. Per wife patient ambulated without device in the house, and has had multiple falls due to poor balance from Parkinson's, but usually falls on his buttocks. He was found to have a left hip fracture and underwent Left hip replacement with Dr. Diaz. Post operatively he had orthostatic hypotension which self resolved. His hospital course was uneventful and he was evaluated by PT and OT who recommended acute rehab. He was admitted to [facility] on 3/1/24.       Rehab course significant for adjustments to medication for orthostatic hypotension and sleep with good improvement, and episode of UTI treated with antibiotics. All other medical co-morbidites were stable.    Pt tolerated course of inpatient pt/ot/slp rehab with significant functional improvements and met rehab goals prior to discharge.     Pt was medically cleared on 3/15/24 for discharge to home with home care.        HPI:  Mr. Kaiden Rodriguez is a 77 year old male with a history of Parkinson's disease since 2022, following with Dr. Ortega. His medical history is also notable for prior colon CA w/ ostomy, prior prostate CA s/p radiation cystitis, blurry vision 2/2 cataracts baseline. He presented to ED at [facility] on 2/27 s/p mechanical fall at home. Per wife patient ambulated without device in the house, and has had multiple falls due to poor balance from Parkinson's, but usually falls on his buttocks. He was found to have a left hip fracture and underwent Left hip replacement with Dr. Diaz. Post operatively he had orthostatic hypotension which self resolved. His hospital course was uneventful and he was evaluated by PT and OT who recommended acute rehab. He was admitted to [facility] on 3/1/24.       Rehab course significant for adjustments to medication for orthostatic hypotension and sleep with good improvement, and episode of UTI treated with antibiotics. During his hospital stay, Mr. Rodriguez exhibited significant daily fluctuations in cognition and alertness compatible with his underlying degenerative disease, worsened in the context of recent fall, hip surgery, and UTI. All other medical co-morbidites were stable.    Pt tolerated course of inpatient pt/ot/slp rehab with significant functional improvements despite variable performance due to fluctuating cognition, and met rehab goals prior to discharge.     Pt was medically cleared on 3/15/24 for discharge to home with home care.

## 2024-03-14 NOTE — DISCHARGE NOTE PROVIDER - NSDCMRMEDTOKEN_GEN_ALL_CORE_FT
acetaminophen 325 mg oral tablet: 2 tab(s) orally every 6 hours As needed Mild Pain (1 - 3)  aluminum hydroxide-magnesium hydroxide 200 mg-200 mg/5 mL oral suspension: 30 milliliter(s) orally every 4 hours As needed Dyspepsia  carbidopa-levodopa 25 mg-100 mg oral tablet: 1 tab(s) orally 4 times a day at 8am, 12pm, 4pm, and 8pm  cefpodoxime 200 mg oral tablet: 1 tab(s) orally 2 times a day  lactobacillus acidophilus oral capsule: 1 tab(s) orally 2 times a day  lidocaine 4% topical film: Apply topically to affected area once a day  melatonin 3 mg oral tablet: 2 tab(s) orally once a day (at bedtime)  midodrine 2.5 mg oral tablet: 3 tab(s) orally 2 times a day at 6am and 1pm  polyethylene glycol 3350 oral powder for reconstitution: 17 gram(s) orally once a day  senna leaf extract oral tablet: 2 tab(s) orally once a day (at bedtime)  zinc oxide 20% topical ointment: 1 Apply topically to affected area 2 times a day   acetaminophen 325 mg oral tablet: 2 tab(s) orally every 6 hours As needed Mild Pain (1 - 3)  aluminum hydroxide-magnesium hydroxide 200 mg-200 mg/5 mL oral suspension: 30 milliliter(s) orally every 4 hours As needed Dyspepsia  aspirin 81 mg oral delayed release tablet: 1 tab(s) orally 2 times a day for total of 6 weeks (end on 4/9)  carbidopa-levodopa 25 mg-100 mg oral tablet: 1 tab(s) orally 4 times a day at 8am, 12pm, 4pm, and 8pm  cefpodoxime 200 mg oral tablet: 1 tab(s) orally 2 times a day  lactobacillus acidophilus oral capsule: 1 tab(s) orally 2 times a day  lidocaine 4% topical film: Apply topically to affected area once a day  melatonin 3 mg oral tablet: 2 tab(s) orally once a day (at bedtime)  midodrine 2.5 mg oral tablet: 3 tab(s) orally 2 times a day at 6am and 1pm  polyethylene glycol 3350 oral powder for reconstitution: 17 gram(s) orally once a day  senna leaf extract oral tablet: 2 tab(s) orally once a day (at bedtime)  zinc oxide 20% topical ointment: 1 Apply topically to affected area 2 times a day

## 2024-03-14 NOTE — PROGRESS NOTE ADULT - SUBJECTIVE AND OBJECTIVE BOX
SUBJECTIVE/ROS: Patient seen in therapy. He has no new complaint this morning. Discussion had with wife to re-educate on patient's needs on discharge. Reiterated that the patient will require more help at home than prior and it is recommended that she consider [employer] or at least private hire. Wife is not in agreement with this recommendation despite multiple conversations. She feels that she is able to care for her  on her own and does not need external help. Education also provided to the patients son who does agree with the our recommendations and will try and assist with educating his mom/the wife. Wife is adamant about discharge tomorrow and will assist in coordinating care/discharge.     HPI:  77 year old male of Parkinson's disease since 2022 and follows with Dr. Ortega, prior colon CA w/ ostomy, prior prostate CA s/p radiation cystitis, blurry vision 2/2 cataracts baseline, who presented to ED at [facility] on 2/27 s/p mechanical fall at home. Per wife patient ambulated without devise in the house, and has had multiple falls due to poor balance from Parkinson's, but usually falls on his buttocks. He was found to have a left hip fracture and underwent Left hip replacement with Dr. Diaz. Post operatively he had orthostatic hypotension which self resolved. His hospital course was uneventful and he was evaluated by PT and OT who recommended acute rehab. He was admitted to [facility] on 3/1/24.        Vital Signs Last 24 Hrs  T(C): 36.5 (14 Mar 2024 07:55), Max: 36.8 (13 Mar 2024 19:00)  T(F): 97.7 (14 Mar 2024 07:55), Max: 98.3 (13 Mar 2024 19:00)  HR: 58 (14 Mar 2024 07:55) (58 - 69)  BP: 146/65 (14 Mar 2024 07:55) (110/69 - 146/65)  BP(mean): --  RR: 16 (14 Mar 2024 07:55) (16 - 16)  SpO2: 97% (14 Mar 2024 07:55) (95% - 97%)    Parameters below as of 14 Mar 2024 07:55  Patient On (Oxygen Delivery Method): room air        PHYSICAL EXAM  Constitutional - NAD, Comfortable  HEENT - NCAT  Chest - Breathing comfortably in room air   Extremities - no edema   Neurologic Exam - patient examined after lunch, sitting comfortably in the chair, moderate intermittent tremor of bilateral UEs worse on the left, occasional myoclonic jerks at UEs. No stimulus-induced myoclonus. Moderate hypomimia with lips parted most of the time and staring expression. EOMI with mild limitation of vertical gaze. Disoriented in time, oriented in space and situation. Recall of date only very partial at 2 min. Moderate bradykinesia worse on the left. Moderate rigidity throughout, worse on the left, with preserved ROM. Able to stand from the chair slowly by pushing on the arms, stands unassisted for several seconds before letting himself fall back in the chair due to fatigue/retropulsion. Gait deferred.              LABS:                          12.2   6.15  )-----------( 352      ( 14 Mar 2024 06:22 )             38.9     03-14    140  |  104  |  26<H>  ----------------------------<  88  5.0   |  27  |  1.07    Ca    9.6      14 Mar 2024 06:22    TPro  7.5  /  Alb  3.3  /  TBili  0.6  /  DBili  x   /  AST  35  /  ALT  21  /  AlkPhos  74  03-14    LIVER FUNCTIONS - ( 14 Mar 2024 06:22 )  Alb: 3.3 g/dL / Pro: 7.5 g/dL / ALK PHOS: 74 U/L / ALT: 21 U/L / AST: 35 U/L / GGT: x             MEDICATIONS  (STANDING):  carbidopa/levodopa  25/100 1 Tablet(s) Oral <User Schedule>  cefpodoxime 200 milliGRAM(s) Oral two times a day  enoxaparin Injectable 40 milliGRAM(s) SubCutaneous every 24 hours  lactobacillus acidophilus 1 Tablet(s) Oral two times a day with meals  lidocaine   4% Patch 1 Patch Transdermal daily  melatonin 6 milliGRAM(s) Oral at bedtime  midodrine. 7.5 milliGRAM(s) Oral <User Schedule>  polyethylene glycol 3350 17 Gram(s) Oral daily  senna 2 Tablet(s) Oral at bedtime  zinc oxide 20% Ointment 1 Application(s) Topical two times a day    MEDICATIONS  (PRN):  acetaminophen     Tablet .. 650 milliGRAM(s) Oral every 6 hours PRN Mild Pain (1 - 3)  aluminum hydroxide/magnesium hydroxide/simethicone Suspension 30 milliLiter(s) Oral every 4 hours PRN Dyspepsia

## 2024-03-14 NOTE — DISCHARGE NOTE NURSING/CASE MANAGEMENT/SOCIAL WORK - NSDCPEFALRISK_GEN_ALL_CORE
For information on Fall & Injury Prevention, visit: [email or web address] OR  [email or web address] OR  https://www.cdc.gov/steadi/patient.html

## 2024-03-14 NOTE — PROGRESS NOTE ADULT - ASSESSMENT
ASSESSMENT/PLAN  77 year old male of Parkinson's disease since 2022 and follows with Dr. Ortega, prior colon CA w/ ostomy, prior prostate CA s/p radiation cystitis, blurry vision 2/2 cataracts baseline, who presented to ED at [facility] on 2/27 s/p mechanical fall at home. Per wife patient ambulated without devise in the house, and has had multiple falls due to poor balance from Parkinson's but usually falls on his buttocks. He was found to have a left hip fracture and underwent Left hip replacement with Dr. Diaz. Post operatively he had orthostatic hypotension which self resolved. His hospital course was uneventful and he was evaluated by PT and OT who recommended acute rehab. He was admitted to [facility] on 3/1/24.   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 Can cover with any dressing if discharge.     ------------------------------------------------------  #Parkinson's related motor symptoms bradykinesia, falls, rigidity, tremors  -Continue Sinemet 25/100mg 1 tab at 8am, 12pm, 4pm and 8pm  -Movement disorders following    --------------------------------------------------------  Parkinson's related non-motor symptoms    #Orthostatic hypotension - improved   - Convulsive Syncopal event on 3/6 -self resolved when lying flat   - Completed 500 cc bolus  - basic labs, mag, Phosp, troponin EKG, UA 3/8 positive for P. Vulgaris normosensitive, on Bactrim since 3/8, remission of symptoms   -Continue midodrine 7.5mg BID- 3/11    #Pain control  - Tylenol PRN  - Tramadol PRN  - Lidocaine patch    #GI/Bowel Management/Constipation  - Continue Senna at bedtime   - Miralax daily 3/4  - Last BM 3/7    #Bladder management  - Continue to monitor PVR q 8 hours (SC if > 400)  - Monitor UO    #UTI  - 3/8 preliminary culture positive for P. Vulgaris, on Bactrim from 3/8 to 3/12, on Vantin 200 mg BID since 3/12, recommended duration of treatment 14 days.     #RBD   - Melatonin 6mg 3/12 -improved sleep     ----------------------------------------------------------  Concurrent medical problems    #DVT Prophylaxis  - Lovenox  - TEDs     #Skin:  - Left hip incision with staples in place.     FEN   - Diet - Regular with thins  - Dysphagia  SLP - evaluation and treatment    Precautions / PROPHYLAXIS:   - Falls  - ortho: Weight bearing status: WBAT with hip precautions   - Lungs: Incentive Spirometer   - Pressure injury/Skin: OOB to Chair, PT/OT        Follow up     Sonido Alvarez  [facility]  [address] [place] [id number]  Phone: [phone]  Fax: [phone]  Follow Up Time:     Adonis Diaz  Orthopaedic Surgery  [address] [place] [address], [address]  [place] [address]  Phone: [phone]  Fax: [phone]

## 2024-03-15 VITALS
DIASTOLIC BLOOD PRESSURE: 66 MMHG | RESPIRATION RATE: 16 BRPM | SYSTOLIC BLOOD PRESSURE: 118 MMHG | HEART RATE: 65 BPM | OXYGEN SATURATION: 97 % | TEMPERATURE: 98 F

## 2024-03-15 PROCEDURE — 99239 HOSP IP/OBS DSCHRG MGMT >30: CPT

## 2024-03-15 RX ORDER — ASPIRIN/CALCIUM CARB/MAGNESIUM 324 MG
1 TABLET ORAL
Qty: 60 | Refills: 0
Start: 2024-03-15 | End: 2024-04-13

## 2024-03-15 RX ADMIN — CARBIDOPA AND LEVODOPA 1 TABLET(S): 25; 100 TABLET ORAL at 09:00

## 2024-03-15 RX ADMIN — Medication 200 MILLIGRAM(S): at 06:09

## 2024-03-15 RX ADMIN — MIDODRINE HYDROCHLORIDE 7.5 MILLIGRAM(S): 2.5 TABLET ORAL at 06:08

## 2024-03-15 RX ADMIN — ZINC OXIDE 1 APPLICATION(S): 200 OINTMENT TOPICAL at 06:04

## 2024-03-15 RX ADMIN — CARBIDOPA AND LEVODOPA 1 TABLET(S): 25; 100 TABLET ORAL at 11:55

## 2024-03-15 RX ADMIN — Medication 1 TABLET(S): at 09:00

## 2024-03-15 RX ADMIN — ENOXAPARIN SODIUM 40 MILLIGRAM(S): 100 INJECTION SUBCUTANEOUS at 06:09

## 2024-03-15 NOTE — PROGRESS NOTE ADULT - NS ATTEND AMEND GEN_ALL_CORE FT
I have personally seen and examined the patient with the NP. Medical records reviewed. I have made amendments to the documentation where necessary and adjusted the history, physical examination, and plan as documented by the NP.
I have personally seen and examined the patient with the NP. Medical records reviewed. I have made amendments to the documentation where necessary and adjusted the history, physical examination, and plan as documented by the NP.
I have personally seen and examined the patient with the NP. Medical records reviewed. I have made amendments to the documentation where necessary and adjusted the history, physical examination, and plan as documented by the NP.    Patient is being discharged home with home care today.  Discharge instructions were discussed with patient and family, all current medications were sent to the pharmacy. Patient and family were educated on importance of medication compliance,  continued  care with PMD and follow-up care with the specialists in the community. Safety and fall risk precautions  were discussed in detail, counseled on healthy life style modifications.  All questions were answered to their satisfaction.      47 min spent

## 2024-03-15 NOTE — PROGRESS NOTE ADULT - PROVIDER SPECIALTY LIST ADULT
Hospitalist
Neuro Rehabilitation
Neuro Rehabilitation
Hospitalist
Neuro Rehabilitation
Hospitalist
Neuro Rehabilitation
Rehab Medicine
Hospitalist
Hospitalist
Neuro Rehabilitation
Neuro Rehabilitation
Neuropsychology
Rehab Medicine

## 2024-03-15 NOTE — PROGRESS NOTE ADULT - REASON FOR ADMISSION
Left JENNFIER
Left hip replacement

## 2024-03-15 NOTE — PROGRESS NOTE ADULT - SUBJECTIVE AND OBJECTIVE BOX
SUBJECTIVE/ROS: Patient seen in bed. He is medically stable and ready for discharge. He denies chest pain, nausea, vomiting, abdominal pain, hip pain. He is medically stable and ready for discharge home.     HPI:  77 year old male of Parkinson's disease since 2022 and follows with Dr. Ortega, prior colon CA w/ ostomy, prior prostate CA s/p radiation cystitis, blurry vision 2/2 cataracts baseline, who presented to ED at [facility] on 2/27 s/p mechanical fall at home. Per wife patient ambulated without devise in the house, and has had multiple falls due to poor balance from Parkinson's, but usually falls on his buttocks. He was found to have a left hip fracture and underwent Left hip replacement with Dr. Diaz. Post operatively he had orthostatic hypotension which self resolved. His hospital course was uneventful and he was evaluated by PT and OT who recommended acute rehab. He was admitted to [facility] on 3/1/24.        Vital Signs Last 24 Hrs  T(C): 36.4 (15 Mar 2024 09:34), Max: 36.8 (14 Mar 2024 20:09)  T(F): 97.6 (15 Mar 2024 09:34), Max: 98.2 (14 Mar 2024 20:09)  HR: 65 (15 Mar 2024 09:34) (65 - 72)  BP: 118/66 (15 Mar 2024 09:34) (118/66 - 138/72)  BP(mean): --  RR: 16 (15 Mar 2024 09:34) (16 - 16)  SpO2: 97% (15 Mar 2024 09:34) (96% - 97%)    Parameters below as of 15 Mar 2024 09:34  Patient On (Oxygen Delivery Method): room air          PHYSICAL EXAM  Constitutional - NAD, Comfortable  HEENT - NCAT  Chest - Breathing comfortably in room air   Extremities - no edema   Neurologic Exam - Aox 2-3, osman antigravity, bradykinesia, mild rigidity throughout  Skin - left hip incision with staples CUAUHTEMOC, no signs of infection/erythema/swelling         LABS:                          12.2   6.15  )-----------( 352      ( 14 Mar 2024 06:22 )             38.9     03-14    140  |  104  |  26<H>  ----------------------------<  88  5.0   |  27  |  1.07    Ca    9.6      14 Mar 2024 06:22    TPro  7.5  /  Alb  3.3  /  TBili  0.6  /  DBili  x   /  AST  35  /  ALT  21  /  AlkPhos  74  03-14    LIVER FUNCTIONS - ( 14 Mar 2024 06:22 )  Alb: 3.3 g/dL / Pro: 7.5 g/dL / ALK PHOS: 74 U/L / ALT: 21 U/L / AST: 35 U/L / GGT: x             MEDICATIONS  (STANDING):  carbidopa/levodopa  25/100 1 Tablet(s) Oral <User Schedule>  cefpodoxime 200 milliGRAM(s) Oral two times a day  enoxaparin Injectable 40 milliGRAM(s) SubCutaneous every 24 hours  lactobacillus acidophilus 1 Tablet(s) Oral two times a day with meals  lidocaine   4% Patch 1 Patch Transdermal daily  melatonin 6 milliGRAM(s) Oral at bedtime  midodrine. 7.5 milliGRAM(s) Oral <User Schedule>  polyethylene glycol 3350 17 Gram(s) Oral daily  senna 2 Tablet(s) Oral at bedtime  zinc oxide 20% Ointment 1 Application(s) Topical two times a day    MEDICATIONS  (PRN):  acetaminophen     Tablet .. 650 milliGRAM(s) Oral every 6 hours PRN Mild Pain (1 - 3)  aluminum hydroxide/magnesium hydroxide/simethicone Suspension 30 milliLiter(s) Oral every 4 hours PRN Dyspepsia

## 2024-03-15 NOTE — PROGRESS NOTE ADULT - ASSESSMENT
ASSESSMENT/PLAN  77 year old male of Parkinson's disease since 2022 and follows with Dr. Ortega, prior colon CA w/ ostomy, prior prostate CA s/p radiation cystitis, blurry vision 2/2 cataracts baseline, who presented to ED at [facility] on 2/27 s/p mechanical fall at home. Per wife patient ambulated without devise in the house, and has had multiple falls due to poor balance from Parkinson's but usually falls on his buttocks. He was found to have a left hip fracture and underwent Left hip replacement with Dr. Diaz. Post operatively he had orthostatic hypotension which self resolved. His hospital course was uneventful and he was evaluated by PT and OT who recommended acute rehab. He was admitted to [facility] on 3/1/24.   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 Can cover with any dressing if discharge.     ------------------------------------------------------  #Parkinson's related motor symptoms bradykinesia, falls, rigidity, tremors  -Continue Sinemet 25/100mg 1 tab at 8am, 12pm, 4pm and 8pm    --------------------------------------------------------  Parkinson's related non-motor symptoms    #Orthostatic hypotension - improved   -Continue midodrine 7.5mg BID- 3/11    #Pain control  - Tylenol PRN  - Lidocaine patch    #GI/Bowel Management/Constipation  - Continue Senna at bedtime   - Miralax daily 3/4    #UTI  - 3/8 preliminary culture positive for P. Vulgaris, on Bactrim from 3/8 to 3/12, on Vantin 200 mg BID since 3/12, recommended duration of treatment 14 days.     #RBD   - Melatonin 6mg 3/12 -improved sleep     ----------------------------------------------------------  Concurrent medical problems    #Skin:  - Left hip incision with staples in place. MARQUITA POSADAS      Follow up     [facility]  [address] [place] [id number]  Phone: [phone]  Fax: [phone]  Follow Up Time:     Adonis Diaz  Orthopaedic Surgery  [address] [place] [address], [facility] [address]  [place] [address]  Phone: [phone]  Fax: [phone]      MEDICALLY STABLE AND READY FOR DISCHARGE HOME.

## 2024-03-15 NOTE — PROGRESS NOTE ADULT - NS ATTEND OPT1 GEN_ALL_CORE
Subjective       Linda Craig [facility] (@ [place], 
I independently performed the documented:

## 2024-03-22 ENCOUNTER — TRANSCRIPTION ENCOUNTER (OUTPATIENT)
Age: 79
End: 2024-03-22

## 2024-03-25 ENCOUNTER — TRANSCRIPTION ENCOUNTER (OUTPATIENT)
Age: 79
End: 2024-03-25

## 2024-03-29 ENCOUNTER — TRANSCRIPTION ENCOUNTER (OUTPATIENT)
Age: 79
End: 2024-03-29

## 2024-04-05 ENCOUNTER — TRANSCRIPTION ENCOUNTER (OUTPATIENT)
Age: 79
End: 2024-04-05

## 2024-04-10 ENCOUNTER — APPOINTMENT (OUTPATIENT)
Dept: NEUROLOGY | Facility: CLINIC | Age: 79
End: 2024-04-10
Payer: MEDICARE

## 2024-04-10 PROCEDURE — G2211 COMPLEX E/M VISIT ADD ON: CPT

## 2024-04-10 PROCEDURE — 99214 OFFICE O/P EST MOD 30 MIN: CPT

## 2024-04-10 RX ORDER — CARBIDOPA AND LEVODOPA 25; 100 MG/1; MG/1
25-100 TABLET ORAL 4 TIMES DAILY
Qty: 360 | Refills: 3 | Status: ACTIVE | COMMUNITY
Start: 2022-08-22 | End: 1900-01-01

## 2024-04-10 NOTE — HISTORY OF PRESENT ILLNESS
[FreeTextEntry1] : The patient is a 78-year-old right-handed male here for follow up for Parkinsonism  Symptoms onset with tremor around 3 or 4 years back and with worsening balance around 2021   1. Since last visit  had total hip replacement and admitted to [facility] subsequently from march 1 to March 15th  During the [facility] visit, Levodopa was increased to one tab four times a day [8-12-4-8]. he is also on midodrine 7.5mg twice a day. since the hip replacement he is mostly in the wheelchair and has not been able to ambulate without assistance, uses a walker for short distance walks 2. His voice is better, and he has decreased blink rate.  He has no drooling or dysphagia.  He does have less trouble turning over in bed.  His handwriting has become smaller.  ADLs more independent.  One fall recently. He has no freezing of gait, but does feel his left leg gets stuck sometimes. 3. His memory is poor. Decline in cognitive ability per family  He does have anxiety but no depression.  He has no hallucinations.  More talking in his sleeps, no acting out.  He has no constipation, has an ostomy bag since he had colon cancer.  He also had prostate cancer.  He has no orthostasis.  4. MRI showed WMD and old lacunar infarcts, advised to start ASA81 mg B12 and TSH were ok 5. Hospitalized for spells of behavioral arrest with dysarthria, happened over 7 -8 times over a day and was admitted to [facility], had detailed stroke and seizure workup which was negative, patient subsequently followed with  in clinic  medications: Levodopa -carbidopa 25/100 one tab four times a day  midodrine 7.5 bid

## 2024-04-10 NOTE — PHYSICAL EXAM
[Person] : oriented to person [Place] : oriented to place [Time] : oriented to time [Naming Objects] : no difficulty naming common objects [Repeating Phrases] : no difficulty repeating a phrase [Fluency] : fluency intact [Comprehension] : comprehension intact [Cranial Nerves Optic (II)] : visual acuity intact bilaterally,  visual fields full to confrontation, pupils equal round and reactive to light [Cranial Nerves Oculomotor (III)] : extraocular motion intact [Cranial Nerves Trigeminal (V)] : facial sensation intact symmetrically [Cranial Nerves Facial (VII)] : face symmetrical [Cranial Nerves Vestibulocochlear (VIII)] : hearing was intact bilaterally [Cranial Nerves Glossopharyngeal (IX)] : tongue and palate midline [Cranial Nerves Accessory (XI - Cranial And Spinal)] : head turning and shoulder shrug symmetric [Cranial Nerves Hypoglossal (XII)] : there was no tongue deviation with protrusion [Motor Strength] : muscle strength was normal in all four extremities [Motor Handedness Right-Handed] : the patient is right hand dominant [Sensation Tactile Decrease] : light touch was intact [Sensation Vibration Decrease] : vibration was intact [1+] : Ankle jerk left 1+ [Dysdiadochokinesia Bilaterally] : not present [Coordination - Dysmetria Impaired Finger-to-Nose Bilateral] : not present [Coordination - Dysmetria Impaired Heel-to-Shin Bilateral] : not present [Plantar Reflex Right Only] : normal on the right [Plantar Reflex Left Only] : normal on the left [FreeTextEntry1] : Blink rate was reduced, but facial expression was otherwise normal.  Voice was slightly lower.  restriced vertical gaze  Tone was moderately increased at the neck, but minimally if at all increased in the arms and legs.    Rapid alternating movements and finger taps were slowed bilaterally, slightly worse on the left today.  Foot tap and toe tap were slowed bilaterally, worse on the left. rigidity worse on the left than right  able to get up from the wheelchair with one person assist, unable to ambulate

## 2024-04-10 NOTE — DISCUSSION/SUMMARY
[FreeTextEntry1] : Patient is a 78 year old male here for follow up for Parkinson ds , symptom onset around 2021 with tremor and falls. Patient was admitted to [facility] from march 1 to March 15 following hip replacement surgery. Major bothersome symptoms, orthostatic hypotension currently on midodrine 7.5twice a day and fluctuating cognition , advised to add rivastigmine to help with it  1. add rivastigmine 1,5mg twice a day  2. continue LD one tab QID 3.continue midodrine 7.5mg BID 4. Advised to exercise; physical therapy referral.  Patient discussed with attending Dr.Niethammer Narcisa Sharp MD Movement Disorder fellow   We discussed the above impression, plan and recommendations during the visit. Counseling represented more then 50% of the 30 minute visit time.

## 2024-04-11 ENCOUNTER — RX RENEWAL (OUTPATIENT)
Age: 79
End: 2024-04-11

## 2024-04-11 RX ORDER — RIVASTIGMINE TARTRATE 1.5 MG/1
1.5 CAPSULE ORAL
Qty: 180 | Refills: 3 | Status: ACTIVE | COMMUNITY
Start: 2024-04-10 | End: 1900-01-01

## 2024-04-11 RX ORDER — MIDODRINE HYDROCHLORIDE 2.5 MG/1
2.5 TABLET ORAL
Qty: 540 | Refills: 3 | Status: ACTIVE | COMMUNITY
Start: 2024-04-10 | End: 1900-01-01

## 2024-04-16 PROCEDURE — 97112 NEUROMUSCULAR REEDUCATION: CPT | Mod: GP

## 2024-04-16 PROCEDURE — 92507 TX SP LANG VOICE COMM INDIV: CPT | Mod: GN

## 2024-04-16 PROCEDURE — 87086 URINE CULTURE/COLONY COUNT: CPT

## 2024-04-16 PROCEDURE — 36415 COLL VENOUS BLD VENIPUNCTURE: CPT

## 2024-04-16 PROCEDURE — 80053 COMPREHEN METABOLIC PANEL: CPT

## 2024-04-16 PROCEDURE — 97110 THERAPEUTIC EXERCISES: CPT | Mod: GO

## 2024-04-16 PROCEDURE — 97535 SELF CARE MNGMENT TRAINING: CPT | Mod: GO

## 2024-04-16 PROCEDURE — 87077 CULTURE AEROBIC IDENTIFY: CPT

## 2024-04-16 PROCEDURE — 87637 SARSCOV2&INF A&B&RSV AMP PRB: CPT

## 2024-04-16 PROCEDURE — 82962 GLUCOSE BLOOD TEST: CPT

## 2024-04-16 PROCEDURE — 97116 GAIT TRAINING THERAPY: CPT | Mod: GP

## 2024-04-16 PROCEDURE — 71045 X-RAY EXAM CHEST 1 VIEW: CPT

## 2024-04-16 PROCEDURE — 97165 OT EVAL LOW COMPLEX 30 MIN: CPT | Mod: GO

## 2024-04-16 PROCEDURE — 86850 RBC ANTIBODY SCREEN: CPT

## 2024-04-16 PROCEDURE — 93005 ELECTROCARDIOGRAM TRACING: CPT

## 2024-04-16 PROCEDURE — 86900 BLOOD TYPING SEROLOGIC ABO: CPT

## 2024-04-16 PROCEDURE — 87186 SC STD MICRODIL/AGAR DIL: CPT

## 2024-04-16 PROCEDURE — 84100 ASSAY OF PHOSPHORUS: CPT

## 2024-04-16 PROCEDURE — 86901 BLOOD TYPING SEROLOGIC RH(D): CPT

## 2024-04-16 PROCEDURE — 97530 THERAPEUTIC ACTIVITIES: CPT | Mod: GO

## 2024-04-16 PROCEDURE — 92523 SPEECH SOUND LANG COMPREHEN: CPT | Mod: GN

## 2024-04-16 PROCEDURE — 81001 URINALYSIS AUTO W/SCOPE: CPT

## 2024-04-16 PROCEDURE — 85027 COMPLETE CBC AUTOMATED: CPT

## 2024-04-16 PROCEDURE — 84484 ASSAY OF TROPONIN QUANT: CPT

## 2024-04-16 PROCEDURE — 92610 EVALUATE SWALLOWING FUNCTION: CPT | Mod: GN

## 2024-04-16 PROCEDURE — 85025 COMPLETE CBC W/AUTO DIFF WBC: CPT

## 2024-04-16 PROCEDURE — 83735 ASSAY OF MAGNESIUM: CPT

## 2024-04-18 ENCOUNTER — APPOINTMENT (OUTPATIENT)
Dept: INTERNAL MEDICINE | Facility: CLINIC | Age: 79
End: 2024-04-18
Payer: MEDICARE

## 2024-04-18 VITALS — HEIGHT: 69 IN | WEIGHT: 178 LBS | BODY MASS INDEX: 26.36 KG/M2

## 2024-04-18 DIAGNOSIS — G90.3 MULTI-SYSTEM DEGENERATION OF THE AUTONOMIC NERVOUS SYSTEM: ICD-10-CM

## 2024-04-18 DIAGNOSIS — G20.A1 PARKINSON'S DISEASE WITHOUT DYSKINESIA, WITHOUT MENTION OF FLUCTUATIONS: ICD-10-CM

## 2024-04-18 DIAGNOSIS — R60.0 LOCALIZED EDEMA: ICD-10-CM

## 2024-04-18 DIAGNOSIS — Z85.46 PERSONAL HISTORY OF MALIGNANT NEOPLASM OF PROSTATE: ICD-10-CM

## 2024-04-18 PROCEDURE — G2211 COMPLEX E/M VISIT ADD ON: CPT

## 2024-04-18 PROCEDURE — 36415 COLL VENOUS BLD VENIPUNCTURE: CPT

## 2024-04-18 PROCEDURE — 99204 OFFICE O/P NEW MOD 45 MIN: CPT

## 2024-04-18 RX ORDER — ASPIRIN ENTERIC COATED TABLETS 81 MG 81 MG/1
81 TABLET, DELAYED RELEASE ORAL
Qty: 30 | Refills: 2 | Status: ACTIVE | COMMUNITY
Start: 2024-04-18 | End: 1900-01-01

## 2024-04-19 ENCOUNTER — TRANSCRIPTION ENCOUNTER (OUTPATIENT)
Age: 79
End: 2024-04-19

## 2024-04-19 LAB
ALBUMIN SERPL ELPH-MCNC: 4.6 G/DL
ALP BLD-CCNC: 90 U/L
ALT SERPL-CCNC: 9 U/L
ANION GAP SERPL CALC-SCNC: 14 MMOL/L
AST SERPL-CCNC: 22 U/L
BASOPHILS # BLD AUTO: 0.04 K/UL
BASOPHILS NFR BLD AUTO: 0.7 %
BILIRUB SERPL-MCNC: 0.3 MG/DL
BUN SERPL-MCNC: 22 MG/DL
CALCIUM SERPL-MCNC: 9.6 MG/DL
CHLORIDE SERPL-SCNC: 105 MMOL/L
CO2 SERPL-SCNC: 23 MMOL/L
CREAT SERPL-MCNC: 0.82 MG/DL
DEPRECATED D DIMER PPP IA-ACNC: 478 NG/ML DDU
EGFR: 89 ML/MIN/1.73M2
EOSINOPHIL # BLD AUTO: 0.15 K/UL
EOSINOPHIL NFR BLD AUTO: 2.5 %
GLUCOSE SERPL-MCNC: 85 MG/DL
HCT VFR BLD CALC: 40.7 %
HGB BLD-MCNC: 12.8 G/DL
IMM GRANULOCYTES NFR BLD AUTO: 0.5 %
LYMPHOCYTES # BLD AUTO: 1.12 K/UL
LYMPHOCYTES NFR BLD AUTO: 18.7 %
MAN DIFF?: NORMAL
MCHC RBC-ENTMCNC: 27.6 PG
MCHC RBC-ENTMCNC: 31.4 GM/DL
MCV RBC AUTO: 87.7 FL
MONOCYTES # BLD AUTO: 0.54 K/UL
MONOCYTES NFR BLD AUTO: 9 %
NEUTROPHILS # BLD AUTO: 4.12 K/UL
NEUTROPHILS NFR BLD AUTO: 68.6 %
NT-PROBNP SERPL-MCNC: 358 PG/ML
PLATELET # BLD AUTO: 259 K/UL
POTASSIUM SERPL-SCNC: 4.6 MMOL/L
PROT SERPL-MCNC: 7 G/DL
RBC # BLD: 4.64 M/UL
RBC # FLD: 14.6 %
SODIUM SERPL-SCNC: 142 MMOL/L
TSH SERPL-ACNC: 1.61 UIU/ML
WBC # FLD AUTO: 6 K/UL

## 2024-04-24 ENCOUNTER — APPOINTMENT (OUTPATIENT)
Dept: ULTRASOUND IMAGING | Facility: CLINIC | Age: 79
End: 2024-04-24
Payer: MEDICARE

## 2024-04-24 ENCOUNTER — OUTPATIENT (OUTPATIENT)
Dept: OUTPATIENT SERVICES | Facility: HOSPITAL | Age: 79
LOS: 1 days | End: 2024-04-24
Payer: MEDICARE

## 2024-04-24 DIAGNOSIS — Z90.49 ACQUIRED ABSENCE OF OTHER SPECIFIED PARTS OF DIGESTIVE TRACT: Chronic | ICD-10-CM

## 2024-04-24 DIAGNOSIS — R60.0 LOCALIZED EDEMA: ICD-10-CM

## 2024-04-24 PROCEDURE — 93970 EXTREMITY STUDY: CPT

## 2024-04-24 PROCEDURE — 93970 EXTREMITY STUDY: CPT | Mod: 26

## 2024-04-30 NOTE — ASSESSMENT
[FreeTextEntry1] : Blood work was drawn and sent to the lab today. The patient has been instructed to call the office next week to discuss today's lab work. continue current meds difficult to begin diuretics due to low bp - on midodrine leg elevation compression stockings f/u pending above

## 2024-04-30 NOTE — HISTORY OF PRESENT ILLNESS
[FreeTextEntry8] : pt presents to establish care here for evalaution of LE edema  has never done dopplers of LE pt with limited mobility - currently in wheelchair no CP/SOB/PALACIOS

## 2024-05-08 ENCOUNTER — TRANSCRIPTION ENCOUNTER (OUTPATIENT)
Age: 79
End: 2024-05-08

## 2024-05-23 ENCOUNTER — TRANSCRIPTION ENCOUNTER (OUTPATIENT)
Age: 79
End: 2024-05-23

## 2024-05-31 ENCOUNTER — TRANSCRIPTION ENCOUNTER (OUTPATIENT)
Age: 79
End: 2024-05-31

## 2024-12-04 ENCOUNTER — APPOINTMENT (OUTPATIENT)
Dept: NEUROLOGY | Facility: CLINIC | Age: 79
End: 2024-12-04
Payer: MEDICARE

## 2024-12-04 VITALS
SYSTOLIC BLOOD PRESSURE: 182 MMHG | HEART RATE: 60 BPM | DIASTOLIC BLOOD PRESSURE: 95 MMHG | HEIGHT: 69 IN | WEIGHT: 180 LBS | BODY MASS INDEX: 26.66 KG/M2

## 2024-12-04 DIAGNOSIS — G20.A1 PARKINSON'S DISEASE WITHOUT DYSKINESIA, WITHOUT MENTION OF FLUCTUATIONS: ICD-10-CM

## 2024-12-04 DIAGNOSIS — G25.0 ESSENTIAL TREMOR: ICD-10-CM

## 2024-12-04 DIAGNOSIS — R41.89 OTHER SYMPTOMS AND SIGNS INVOLVING COGNITIVE FUNCTIONS AND AWARENESS: ICD-10-CM

## 2024-12-04 DIAGNOSIS — G90.3 MULTI-SYSTEM DEGENERATION OF THE AUTONOMIC NERVOUS SYSTEM: ICD-10-CM

## 2024-12-04 PROCEDURE — 99214 OFFICE O/P EST MOD 30 MIN: CPT

## 2024-12-04 PROCEDURE — G2211 COMPLEX E/M VISIT ADD ON: CPT

## 2024-12-23 ENCOUNTER — OUTPATIENT (OUTPATIENT)
Dept: OUTPATIENT SERVICES | Facility: HOSPITAL | Age: 79
LOS: 1 days | Discharge: HOME | End: 2024-12-23
Payer: MEDICARE

## 2024-12-23 ENCOUNTER — APPOINTMENT (OUTPATIENT)
Dept: WOUND CARE | Facility: HOSPITAL | Age: 79
End: 2024-12-23
Payer: MEDICARE

## 2024-12-23 VITALS
HEART RATE: 68 BPM | WEIGHT: 180 LBS | TEMPERATURE: 99.1 F | BODY MASS INDEX: 26.66 KG/M2 | RESPIRATION RATE: 16 BRPM | DIASTOLIC BLOOD PRESSURE: 66 MMHG | OXYGEN SATURATION: 95 % | HEIGHT: 69 IN | SYSTOLIC BLOOD PRESSURE: 140 MMHG

## 2024-12-23 DIAGNOSIS — L97.801 NON-PRESSURE CHRONIC ULCER OF OTHER PART OF UNSPECIFIED LOWER LEG LIMITED TO BREAKDOWN OF SKIN: ICD-10-CM

## 2024-12-23 DIAGNOSIS — Z90.49 ACQUIRED ABSENCE OF OTHER SPECIFIED PARTS OF DIGESTIVE TRACT: Chronic | ICD-10-CM

## 2024-12-23 DIAGNOSIS — R60.0 LOCALIZED EDEMA: ICD-10-CM

## 2024-12-23 PROCEDURE — 99203 OFFICE O/P NEW LOW 30 MIN: CPT

## 2024-12-23 PROCEDURE — G0463: CPT

## 2024-12-26 DIAGNOSIS — Z79.82 LONG TERM (CURRENT) USE OF ASPIRIN: ICD-10-CM

## 2024-12-26 DIAGNOSIS — G20.A1 PARKINSON'S DISEASE WITHOUT DYSKINESIA, WITHOUT MENTION OF FLUCTUATIONS: ICD-10-CM

## 2024-12-26 DIAGNOSIS — Z98.49 CATARACT EXTRACTION STATUS, UNSPECIFIED EYE: ICD-10-CM

## 2024-12-26 DIAGNOSIS — Z79.899 OTHER LONG TERM (CURRENT) DRUG THERAPY: ICD-10-CM

## 2024-12-26 DIAGNOSIS — G90.3 MULTI-SYSTEM DEGENERATION OF THE AUTONOMIC NERVOUS SYSTEM: ICD-10-CM

## 2024-12-26 DIAGNOSIS — R60.0 LOCALIZED EDEMA: ICD-10-CM

## 2024-12-26 DIAGNOSIS — Z85.46 PERSONAL HISTORY OF MALIGNANT NEOPLASM OF PROSTATE: ICD-10-CM

## 2024-12-26 DIAGNOSIS — Z85.038 PERSONAL HISTORY OF OTHER MALIGNANT NEOPLASM OF LARGE INTESTINE: ICD-10-CM

## 2024-12-26 DIAGNOSIS — Z96.649 PRESENCE OF UNSPECIFIED ARTIFICIAL HIP JOINT: ICD-10-CM

## 2025-01-02 ENCOUNTER — INPATIENT (INPATIENT)
Facility: HOSPITAL | Age: 80
LOS: 1 days | Discharge: HOME HEALTH | DRG: 392 | End: 2025-01-04
Attending: SURGERY | Admitting: SURGERY
Payer: MEDICARE

## 2025-01-02 VITALS
TEMPERATURE: 98 F | OXYGEN SATURATION: 99 % | WEIGHT: 171.08 LBS | DIASTOLIC BLOOD PRESSURE: 64 MMHG | RESPIRATION RATE: 18 BRPM | HEIGHT: 70 IN | HEART RATE: 81 BPM | SYSTOLIC BLOOD PRESSURE: 97 MMHG

## 2025-01-02 DIAGNOSIS — Z90.49 ACQUIRED ABSENCE OF OTHER SPECIFIED PARTS OF DIGESTIVE TRACT: Chronic | ICD-10-CM

## 2025-01-02 LAB
ALBUMIN SERPL ELPH-MCNC: 4.5 G/DL — SIGNIFICANT CHANGE UP (ref 3.3–5)
ALP SERPL-CCNC: 82 U/L — SIGNIFICANT CHANGE UP (ref 40–120)
ALT FLD-CCNC: 25 U/L — SIGNIFICANT CHANGE UP (ref 10–45)
ANION GAP SERPL CALC-SCNC: 16 MMOL/L — SIGNIFICANT CHANGE UP (ref 5–17)
APTT BLD: 29.1 SEC — SIGNIFICANT CHANGE UP (ref 24.5–35.6)
AST SERPL-CCNC: 22 U/L — SIGNIFICANT CHANGE UP (ref 10–40)
BASOPHILS # BLD AUTO: 0.02 K/UL — SIGNIFICANT CHANGE UP (ref 0–0.2)
BASOPHILS NFR BLD AUTO: 0.2 % — SIGNIFICANT CHANGE UP (ref 0–2)
BILIRUB SERPL-MCNC: 0.8 MG/DL — SIGNIFICANT CHANGE UP (ref 0.2–1.2)
BLD GP AB SCN SERPL QL: NEGATIVE — SIGNIFICANT CHANGE UP
BUN SERPL-MCNC: 27 MG/DL — HIGH (ref 7–23)
CALCIUM SERPL-MCNC: 10.1 MG/DL — SIGNIFICANT CHANGE UP (ref 8.4–10.5)
CHLORIDE SERPL-SCNC: 98 MMOL/L — SIGNIFICANT CHANGE UP (ref 96–108)
CO2 SERPL-SCNC: 24 MMOL/L — SIGNIFICANT CHANGE UP (ref 22–31)
CREAT SERPL-MCNC: 1.12 MG/DL — SIGNIFICANT CHANGE UP (ref 0.5–1.3)
EGFR: 66 ML/MIN/1.73M2 — SIGNIFICANT CHANGE UP
EGFR: 66 ML/MIN/1.73M2 — SIGNIFICANT CHANGE UP
EOSINOPHIL # BLD AUTO: 0 K/UL — SIGNIFICANT CHANGE UP (ref 0–0.5)
EOSINOPHIL NFR BLD AUTO: 0 % — SIGNIFICANT CHANGE UP (ref 0–6)
GAS PNL BLDV: SIGNIFICANT CHANGE UP
GLUCOSE SERPL-MCNC: 173 MG/DL — HIGH (ref 70–99)
HCT VFR BLD CALC: 46.2 % — SIGNIFICANT CHANGE UP (ref 39–50)
HGB BLD-MCNC: 14.7 G/DL — SIGNIFICANT CHANGE UP (ref 13–17)
IMM GRANULOCYTES NFR BLD AUTO: 0.3 % — SIGNIFICANT CHANGE UP (ref 0–0.9)
INR BLD: 1.14 RATIO — SIGNIFICANT CHANGE UP (ref 0.85–1.16)
LIDOCAIN IGE QN: 12 U/L — SIGNIFICANT CHANGE UP (ref 7–60)
LYMPHOCYTES # BLD AUTO: 0.67 K/UL — LOW (ref 1–3.3)
LYMPHOCYTES # BLD AUTO: 6.3 % — LOW (ref 13–44)
MCHC RBC-ENTMCNC: 28.5 PG — SIGNIFICANT CHANGE UP (ref 27–34)
MCHC RBC-ENTMCNC: 31.8 G/DL — LOW (ref 32–36)
MCV RBC AUTO: 89.5 FL — SIGNIFICANT CHANGE UP (ref 80–100)
MONOCYTES # BLD AUTO: 0.56 K/UL — SIGNIFICANT CHANGE UP (ref 0–0.9)
MONOCYTES NFR BLD AUTO: 5.3 % — SIGNIFICANT CHANGE UP (ref 2–14)
NEUTROPHILS # BLD AUTO: 9.38 K/UL — HIGH (ref 1.8–7.4)
NEUTROPHILS NFR BLD AUTO: 87.9 % — HIGH (ref 43–77)
NRBC # BLD: 0 /100 WBCS — SIGNIFICANT CHANGE UP (ref 0–0)
NRBC BLD-RTO: 0 /100 WBCS — SIGNIFICANT CHANGE UP (ref 0–0)
PLATELET # BLD AUTO: 237 K/UL — SIGNIFICANT CHANGE UP (ref 150–400)
POTASSIUM SERPL-MCNC: 4.5 MMOL/L — SIGNIFICANT CHANGE UP (ref 3.5–5.3)
POTASSIUM SERPL-SCNC: 4.5 MMOL/L — SIGNIFICANT CHANGE UP (ref 3.5–5.3)
PROT SERPL-MCNC: 7.5 G/DL — SIGNIFICANT CHANGE UP (ref 6–8.3)
PROTHROM AB SERPL-ACNC: 13 SEC — SIGNIFICANT CHANGE UP (ref 9.9–13.4)
RBC # BLD: 5.16 M/UL — SIGNIFICANT CHANGE UP (ref 4.2–5.8)
RBC # FLD: 14.2 % — SIGNIFICANT CHANGE UP (ref 10.3–14.5)
RH IG SCN BLD-IMP: POSITIVE — SIGNIFICANT CHANGE UP
SODIUM SERPL-SCNC: 138 MMOL/L — SIGNIFICANT CHANGE UP (ref 135–145)
WBC # BLD: 10.66 K/UL — HIGH (ref 3.8–10.5)
WBC # FLD AUTO: 10.66 K/UL — HIGH (ref 3.8–10.5)

## 2025-01-02 PROCEDURE — 71045 X-RAY EXAM CHEST 1 VIEW: CPT | Mod: 26

## 2025-01-02 PROCEDURE — 99285 EMERGENCY DEPT VISIT HI MDM: CPT | Mod: GC

## 2025-01-02 PROCEDURE — 99222 1ST HOSP IP/OBS MODERATE 55: CPT

## 2025-01-02 PROCEDURE — 74177 CT ABD & PELVIS W/CONTRAST: CPT | Mod: 26,MC

## 2025-01-02 RX ORDER — SODIUM CHLORIDE 9 G/1000ML
1000 INJECTION, SOLUTION INTRAVENOUS
Refills: 0 | Status: DISCONTINUED | OUTPATIENT
Start: 2025-01-02 | End: 2025-01-04

## 2025-01-02 RX ORDER — ENOXAPARIN SODIUM 100 MG/ML
40 INJECTION SUBCUTANEOUS EVERY 24 HOURS
Refills: 0 | Status: DISCONTINUED | OUTPATIENT
Start: 2025-01-02 | End: 2025-01-04

## 2025-01-02 RX ORDER — ACETAMINOPHEN 500 MG/5ML
1000 LIQUID (ML) ORAL ONCE
Refills: 0 | Status: COMPLETED | OUTPATIENT
Start: 2025-01-02 | End: 2025-01-02

## 2025-01-02 RX ADMIN — Medication 1000 MILLILITER(S): at 15:59

## 2025-01-02 RX ADMIN — ENOXAPARIN SODIUM 40 MILLIGRAM(S): 100 INJECTION SUBCUTANEOUS at 21:07

## 2025-01-03 DIAGNOSIS — K56.609 UNSPECIFIED INTESTINAL OBSTRUCTION, UNSPECIFIED AS TO PARTIAL VERSUS COMPLETE OBSTRUCTION: ICD-10-CM

## 2025-01-03 DIAGNOSIS — G20.A1 PARKINSON'S DISEASE WITHOUT DYSKINESIA, WITHOUT MENTION OF FLUCTUATIONS: ICD-10-CM

## 2025-01-03 DIAGNOSIS — Z29.9 ENCOUNTER FOR PROPHYLACTIC MEASURES, UNSPECIFIED: ICD-10-CM

## 2025-01-03 LAB
ANION GAP SERPL CALC-SCNC: 13 MMOL/L — SIGNIFICANT CHANGE UP (ref 5–17)
ANION GAP SERPL CALC-SCNC: 13 MMOL/L — SIGNIFICANT CHANGE UP (ref 5–17)
BUN SERPL-MCNC: 20 MG/DL — SIGNIFICANT CHANGE UP (ref 7–23)
BUN SERPL-MCNC: 21 MG/DL — SIGNIFICANT CHANGE UP (ref 7–23)
CALCIUM SERPL-MCNC: 9.4 MG/DL — SIGNIFICANT CHANGE UP (ref 8.4–10.5)
CALCIUM SERPL-MCNC: 9.7 MG/DL — SIGNIFICANT CHANGE UP (ref 8.4–10.5)
CHLORIDE SERPL-SCNC: 102 MMOL/L — SIGNIFICANT CHANGE UP (ref 96–108)
CHLORIDE SERPL-SCNC: 105 MMOL/L — SIGNIFICANT CHANGE UP (ref 96–108)
CO2 SERPL-SCNC: 22 MMOL/L — SIGNIFICANT CHANGE UP (ref 22–31)
CO2 SERPL-SCNC: 24 MMOL/L — SIGNIFICANT CHANGE UP (ref 22–31)
CREAT SERPL-MCNC: 0.83 MG/DL — SIGNIFICANT CHANGE UP (ref 0.5–1.3)
CREAT SERPL-MCNC: 0.85 MG/DL — SIGNIFICANT CHANGE UP (ref 0.5–1.3)
EGFR: 88 ML/MIN/1.73M2 — SIGNIFICANT CHANGE UP
GLUCOSE SERPL-MCNC: 100 MG/DL — HIGH (ref 70–99)
GLUCOSE SERPL-MCNC: 97 MG/DL — SIGNIFICANT CHANGE UP (ref 70–99)
HCT VFR BLD CALC: 46.3 % — SIGNIFICANT CHANGE UP (ref 39–50)
HGB BLD-MCNC: 14.5 G/DL — SIGNIFICANT CHANGE UP (ref 13–17)
MAGNESIUM SERPL-MCNC: 2.2 MG/DL — SIGNIFICANT CHANGE UP (ref 1.6–2.6)
MCHC RBC-ENTMCNC: 27.7 PG — SIGNIFICANT CHANGE UP (ref 27–34)
MCHC RBC-ENTMCNC: 31.3 G/DL — LOW (ref 32–36)
MCV RBC AUTO: 88.5 FL — SIGNIFICANT CHANGE UP (ref 80–100)
NRBC # BLD: 0 /100 WBCS — SIGNIFICANT CHANGE UP (ref 0–0)
NRBC BLD-RTO: 0 /100 WBCS — SIGNIFICANT CHANGE UP (ref 0–0)
PHOSPHATE SERPL-MCNC: 3.4 MG/DL — SIGNIFICANT CHANGE UP (ref 2.5–4.5)
PLATELET # BLD AUTO: 221 K/UL — SIGNIFICANT CHANGE UP (ref 150–400)
POTASSIUM SERPL-MCNC: 4.6 MMOL/L — SIGNIFICANT CHANGE UP (ref 3.5–5.3)
POTASSIUM SERPL-MCNC: 6.1 MMOL/L — HIGH (ref 3.5–5.3)
POTASSIUM SERPL-SCNC: 4.6 MMOL/L — SIGNIFICANT CHANGE UP (ref 3.5–5.3)
POTASSIUM SERPL-SCNC: 6.1 MMOL/L — HIGH (ref 3.5–5.3)
RBC # BLD: 5.23 M/UL — SIGNIFICANT CHANGE UP (ref 4.2–5.8)
RBC # FLD: 14.2 % — SIGNIFICANT CHANGE UP (ref 10.3–14.5)
SODIUM SERPL-SCNC: 139 MMOL/L — SIGNIFICANT CHANGE UP (ref 135–145)
SODIUM SERPL-SCNC: 140 MMOL/L — SIGNIFICANT CHANGE UP (ref 135–145)
WBC # BLD: 8.97 K/UL — SIGNIFICANT CHANGE UP (ref 3.8–10.5)
WBC # FLD AUTO: 8.97 K/UL — SIGNIFICANT CHANGE UP (ref 3.8–10.5)

## 2025-01-03 PROCEDURE — 99231 SBSQ HOSP IP/OBS SF/LOW 25: CPT | Mod: FS

## 2025-01-03 PROCEDURE — 74018 RADEX ABDOMEN 1 VIEW: CPT | Mod: 26

## 2025-01-03 PROCEDURE — 99223 1ST HOSP IP/OBS HIGH 75: CPT

## 2025-01-03 RX ORDER — CARBIDOPA/LEVODOPA 25MG-100MG
1 TABLET ORAL
Refills: 0 | Status: DISCONTINUED | OUTPATIENT
Start: 2025-01-03 | End: 2025-01-03

## 2025-01-03 RX ORDER — MIDODRINE HYDROCHLORIDE 5 MG/1
7.5 TABLET ORAL
Refills: 0 | Status: DISCONTINUED | OUTPATIENT
Start: 2025-01-03 | End: 2025-01-03

## 2025-01-03 RX ORDER — MIDODRINE HYDROCHLORIDE 5 MG/1
7.5 TABLET ORAL
Refills: 0 | Status: DISCONTINUED | OUTPATIENT
Start: 2025-01-03 | End: 2025-01-04

## 2025-01-03 RX ORDER — CARBIDOPA/LEVODOPA 25MG-100MG
1 TABLET ORAL
Refills: 0 | Status: DISCONTINUED | OUTPATIENT
Start: 2025-01-03 | End: 2025-01-04

## 2025-01-03 RX ADMIN — Medication 1 TABLET(S): at 13:04

## 2025-01-03 RX ADMIN — Medication 1 TABLET(S): at 21:46

## 2025-01-03 RX ADMIN — ENOXAPARIN SODIUM 40 MILLIGRAM(S): 100 INJECTION SUBCUTANEOUS at 21:47

## 2025-01-03 RX ADMIN — Medication 1 TABLET(S): at 17:23

## 2025-01-03 RX ADMIN — Medication 1 SPRAY(S): at 01:34

## 2025-01-03 NOTE — DIETITIAN INITIAL EVALUATION ADULT - ETIOLOGY
related to increased physiological demand for healing  Secondary malignant neoplasm of retroperitoneum and peritoneum

## 2025-01-04 ENCOUNTER — TRANSCRIPTION ENCOUNTER (OUTPATIENT)
Age: 80
End: 2025-01-04

## 2025-01-04 VITALS
DIASTOLIC BLOOD PRESSURE: 74 MMHG | SYSTOLIC BLOOD PRESSURE: 148 MMHG | RESPIRATION RATE: 18 BRPM | OXYGEN SATURATION: 96 % | HEART RATE: 66 BPM

## 2025-01-04 LAB
ANION GAP SERPL CALC-SCNC: 13 MMOL/L — SIGNIFICANT CHANGE UP (ref 5–17)
BUN SERPL-MCNC: 19 MG/DL — SIGNIFICANT CHANGE UP (ref 7–23)
CALCIUM SERPL-MCNC: 9 MG/DL — SIGNIFICANT CHANGE UP (ref 8.4–10.5)
CHLORIDE SERPL-SCNC: 105 MMOL/L — SIGNIFICANT CHANGE UP (ref 96–108)
CO2 SERPL-SCNC: 23 MMOL/L — SIGNIFICANT CHANGE UP (ref 22–31)
CREAT SERPL-MCNC: 0.81 MG/DL — SIGNIFICANT CHANGE UP (ref 0.5–1.3)
EGFR: 89 ML/MIN/1.73M2 — SIGNIFICANT CHANGE UP
EGFR: 89 ML/MIN/1.73M2 — SIGNIFICANT CHANGE UP
GLUCOSE SERPL-MCNC: 87 MG/DL — SIGNIFICANT CHANGE UP (ref 70–99)
HCT VFR BLD CALC: 41.6 % — SIGNIFICANT CHANGE UP (ref 39–50)
HGB BLD-MCNC: 13 G/DL — SIGNIFICANT CHANGE UP (ref 13–17)
MAGNESIUM SERPL-MCNC: 2 MG/DL — SIGNIFICANT CHANGE UP (ref 1.6–2.6)
MCHC RBC-ENTMCNC: 28.4 PG — SIGNIFICANT CHANGE UP (ref 27–34)
MCHC RBC-ENTMCNC: 31.3 G/DL — LOW (ref 32–36)
MCV RBC AUTO: 90.8 FL — SIGNIFICANT CHANGE UP (ref 80–100)
NRBC # BLD: 0 /100 WBCS — SIGNIFICANT CHANGE UP (ref 0–0)
NRBC BLD-RTO: 0 /100 WBCS — SIGNIFICANT CHANGE UP (ref 0–0)
PHOSPHATE SERPL-MCNC: 2.7 MG/DL — SIGNIFICANT CHANGE UP (ref 2.5–4.5)
PLATELET # BLD AUTO: 203 K/UL — SIGNIFICANT CHANGE UP (ref 150–400)
POTASSIUM SERPL-MCNC: 3.9 MMOL/L — SIGNIFICANT CHANGE UP (ref 3.5–5.3)
POTASSIUM SERPL-SCNC: 3.9 MMOL/L — SIGNIFICANT CHANGE UP (ref 3.5–5.3)
RBC # BLD: 4.58 M/UL — SIGNIFICANT CHANGE UP (ref 4.2–5.8)
RBC # FLD: 14.3 % — SIGNIFICANT CHANGE UP (ref 10.3–14.5)
SODIUM SERPL-SCNC: 141 MMOL/L — SIGNIFICANT CHANGE UP (ref 135–145)
WBC # BLD: 6.94 K/UL — SIGNIFICANT CHANGE UP (ref 3.8–10.5)
WBC # FLD AUTO: 6.94 K/UL — SIGNIFICANT CHANGE UP (ref 3.8–10.5)

## 2025-01-04 PROCEDURE — 84100 ASSAY OF PHOSPHORUS: CPT

## 2025-01-04 PROCEDURE — 82947 ASSAY GLUCOSE BLOOD QUANT: CPT

## 2025-01-04 PROCEDURE — 83690 ASSAY OF LIPASE: CPT

## 2025-01-04 PROCEDURE — 82803 BLOOD GASES ANY COMBINATION: CPT

## 2025-01-04 PROCEDURE — 85027 COMPLETE CBC AUTOMATED: CPT

## 2025-01-04 PROCEDURE — 85025 COMPLETE CBC W/AUTO DIFF WBC: CPT

## 2025-01-04 PROCEDURE — 83605 ASSAY OF LACTIC ACID: CPT

## 2025-01-04 PROCEDURE — 74177 CT ABD & PELVIS W/CONTRAST: CPT | Mod: MC

## 2025-01-04 PROCEDURE — 71045 X-RAY EXAM CHEST 1 VIEW: CPT

## 2025-01-04 PROCEDURE — 86901 BLOOD TYPING SEROLOGIC RH(D): CPT

## 2025-01-04 PROCEDURE — 85018 HEMOGLOBIN: CPT

## 2025-01-04 PROCEDURE — 80048 BASIC METABOLIC PNL TOTAL CA: CPT

## 2025-01-04 PROCEDURE — 85610 PROTHROMBIN TIME: CPT

## 2025-01-04 PROCEDURE — 99285 EMERGENCY DEPT VISIT HI MDM: CPT

## 2025-01-04 PROCEDURE — 85730 THROMBOPLASTIN TIME PARTIAL: CPT

## 2025-01-04 PROCEDURE — 83735 ASSAY OF MAGNESIUM: CPT

## 2025-01-04 PROCEDURE — 97161 PT EVAL LOW COMPLEX 20 MIN: CPT

## 2025-01-04 PROCEDURE — 82435 ASSAY OF BLOOD CHLORIDE: CPT

## 2025-01-04 PROCEDURE — 86850 RBC ANTIBODY SCREEN: CPT

## 2025-01-04 PROCEDURE — 80053 COMPREHEN METABOLIC PANEL: CPT

## 2025-01-04 PROCEDURE — 84132 ASSAY OF SERUM POTASSIUM: CPT

## 2025-01-04 PROCEDURE — 97116 GAIT TRAINING THERAPY: CPT

## 2025-01-04 PROCEDURE — 86900 BLOOD TYPING SEROLOGIC ABO: CPT

## 2025-01-04 PROCEDURE — 97530 THERAPEUTIC ACTIVITIES: CPT

## 2025-01-04 PROCEDURE — 36415 COLL VENOUS BLD VENIPUNCTURE: CPT

## 2025-01-04 PROCEDURE — 82330 ASSAY OF CALCIUM: CPT

## 2025-01-04 PROCEDURE — 85014 HEMATOCRIT: CPT

## 2025-01-04 PROCEDURE — 84295 ASSAY OF SERUM SODIUM: CPT

## 2025-01-04 PROCEDURE — 74018 RADEX ABDOMEN 1 VIEW: CPT

## 2025-01-04 PROCEDURE — 99231 SBSQ HOSP IP/OBS SF/LOW 25: CPT

## 2025-01-04 RX ADMIN — Medication 1 TABLET(S): at 11:49

## 2025-01-04 RX ADMIN — Medication 1 TABLET(S): at 08:35

## 2025-01-04 RX ADMIN — Medication 1 TABLET(S): at 16:17

## 2025-01-04 RX ADMIN — MIDODRINE HYDROCHLORIDE 7.5 MILLIGRAM(S): 5 TABLET ORAL at 13:14

## 2025-01-06 ENCOUNTER — APPOINTMENT (OUTPATIENT)
Dept: WOUND CARE | Facility: HOSPITAL | Age: 80
End: 2025-01-06

## 2025-03-21 DIAGNOSIS — G20.A1 PARKINSON'S DISEASE WITHOUT DYSKINESIA, WITHOUT MENTION OF FLUCTUATIONS: ICD-10-CM

## 2025-03-28 ENCOUNTER — RX RENEWAL (OUTPATIENT)
Age: 80
End: 2025-03-28

## 2025-04-01 ENCOUNTER — NON-APPOINTMENT (OUTPATIENT)
Age: 80
End: 2025-04-01

## 2025-04-04 ENCOUNTER — NON-APPOINTMENT (OUTPATIENT)
Age: 80
End: 2025-04-04

## 2025-06-20 NOTE — DIETITIAN INITIAL EVALUATION ADULT - PERSON TAUGHT/METHOD
Patient called to see if you can fill his Promethazine-Codeine 6.25-10 MG/5ML SOLN.  He said his lung doctor never responded to him on getting it filled and he needs it.      The medication can be sent to [facility].    Please advise, thank you.  
levodopa/protein interaction/verbal instruction/teach back - (Patient repeats in own words)/spouse instructed

## 2025-07-16 ENCOUNTER — APPOINTMENT (OUTPATIENT)
Dept: NEUROLOGY | Facility: CLINIC | Age: 80
End: 2025-07-16
Payer: MEDICARE

## 2025-07-16 VITALS
HEART RATE: 60 BPM | HEIGHT: 69 IN | DIASTOLIC BLOOD PRESSURE: 85 MMHG | BODY MASS INDEX: 26.66 KG/M2 | SYSTOLIC BLOOD PRESSURE: 185 MMHG | WEIGHT: 180 LBS

## 2025-07-16 PROCEDURE — 99214 OFFICE O/P EST MOD 30 MIN: CPT

## 2025-07-16 PROCEDURE — G2211 COMPLEX E/M VISIT ADD ON: CPT

## 2025-07-16 RX ORDER — MEMANTINE 5 MG/1
5 TABLET ORAL
Qty: 60 | Refills: 5 | Status: ACTIVE | COMMUNITY
Start: 2025-07-16 | End: 1900-01-01

## 2025-07-21 ENCOUNTER — INPATIENT (INPATIENT)
Facility: HOSPITAL | Age: 80
LOS: 1 days | Discharge: HOME | DRG: 445 | End: 2025-07-23
Attending: STUDENT IN AN ORGANIZED HEALTH CARE EDUCATION/TRAINING PROGRAM | Admitting: STUDENT IN AN ORGANIZED HEALTH CARE EDUCATION/TRAINING PROGRAM
Payer: COMMERCIAL

## 2025-07-21 VITALS
HEIGHT: 69 IN | TEMPERATURE: 102 F | OXYGEN SATURATION: 94 % | HEART RATE: 88 BPM | WEIGHT: 179.9 LBS | SYSTOLIC BLOOD PRESSURE: 123 MMHG | DIASTOLIC BLOOD PRESSURE: 73 MMHG | RESPIRATION RATE: 20 BRPM

## 2025-07-21 DIAGNOSIS — Z90.49 ACQUIRED ABSENCE OF OTHER SPECIFIED PARTS OF DIGESTIVE TRACT: Chronic | ICD-10-CM

## 2025-07-21 PROCEDURE — 99285 EMERGENCY DEPT VISIT HI MDM: CPT | Mod: GC

## 2025-07-22 DIAGNOSIS — R41.82 ALTERED MENTAL STATUS, UNSPECIFIED: ICD-10-CM

## 2025-07-22 LAB
ADD ON TEST-SPECIMEN IN LAB: SIGNIFICANT CHANGE UP
ALBUMIN SERPL ELPH-MCNC: 4.2 G/DL — SIGNIFICANT CHANGE UP (ref 3.3–5)
ALP SERPL-CCNC: 137 U/L — HIGH (ref 40–120)
ALT FLD-CCNC: 173 U/L — HIGH (ref 10–45)
ANION GAP SERPL CALC-SCNC: 17 MMOL/L — SIGNIFICANT CHANGE UP (ref 5–17)
APPEARANCE UR: CLEAR — SIGNIFICANT CHANGE UP
APTT BLD: 30.9 SEC — SIGNIFICANT CHANGE UP (ref 26.1–36.8)
AST SERPL-CCNC: 244 U/L — HIGH (ref 10–40)
BACTERIA # UR AUTO: ABNORMAL /HPF
BASOPHILS # BLD AUTO: 0.03 K/UL — SIGNIFICANT CHANGE UP (ref 0–0.2)
BASOPHILS # BLD MANUAL: 0 K/UL — SIGNIFICANT CHANGE UP (ref 0–0.2)
BASOPHILS NFR BLD AUTO: 0.3 % — SIGNIFICANT CHANGE UP (ref 0–2)
BASOPHILS NFR BLD MANUAL: 0 % — SIGNIFICANT CHANGE UP (ref 0–2)
BILIRUB DIRECT SERPL-MCNC: 0.9 MG/DL — HIGH (ref 0–0.3)
BILIRUB INDIRECT FLD-MCNC: 1.3 MG/DL — HIGH (ref 0.2–1)
BILIRUB SERPL-MCNC: 2.2 MG/DL — HIGH (ref 0.2–1.2)
BILIRUB SERPL-MCNC: SIGNIFICANT CHANGE UP MG/DL (ref 0.2–1.2)
BILIRUB UR-MCNC: ABNORMAL
BUN SERPL-MCNC: 23 MG/DL — SIGNIFICANT CHANGE UP (ref 7–23)
CALCIUM SERPL-MCNC: 9.5 MG/DL — SIGNIFICANT CHANGE UP (ref 8.4–10.5)
CAST: 1 /LPF — SIGNIFICANT CHANGE UP (ref 0–4)
CHLORIDE SERPL-SCNC: 102 MMOL/L — SIGNIFICANT CHANGE UP (ref 96–108)
CO2 SERPL-SCNC: 19 MMOL/L — LOW (ref 22–31)
COLOR SPEC: SIGNIFICANT CHANGE UP
CREAT SERPL-MCNC: 1.09 MG/DL — SIGNIFICANT CHANGE UP (ref 0.5–1.3)
DIFF PNL FLD: ABNORMAL
EGFR: 69 ML/MIN/1.73M2 — SIGNIFICANT CHANGE UP
EGFR: 69 ML/MIN/1.73M2 — SIGNIFICANT CHANGE UP
EOSINOPHIL # BLD AUTO: 0 K/UL — SIGNIFICANT CHANGE UP (ref 0–0.5)
EOSINOPHIL # BLD MANUAL: 0 K/UL — SIGNIFICANT CHANGE UP (ref 0–0.5)
EOSINOPHIL NFR BLD AUTO: 0 % — SIGNIFICANT CHANGE UP (ref 0–6)
EOSINOPHIL NFR BLD MANUAL: 0 % — SIGNIFICANT CHANGE UP (ref 0–6)
FLUAV AG NPH QL: SIGNIFICANT CHANGE UP
FLUBV AG NPH QL: SIGNIFICANT CHANGE UP
GAS PNL BLDV: SIGNIFICANT CHANGE UP
GIANT PLATELETS BLD QL SMEAR: PRESENT
GLUCOSE SERPL-MCNC: 124 MG/DL — HIGH (ref 70–99)
GLUCOSE UR QL: NEGATIVE MG/DL — SIGNIFICANT CHANGE UP
HCT VFR BLD CALC: 43.1 % — SIGNIFICANT CHANGE UP (ref 39–50)
HGB BLD-MCNC: 13.9 G/DL — SIGNIFICANT CHANGE UP (ref 13–17)
IMM GRANULOCYTES # BLD AUTO: 0.03 K/UL — SIGNIFICANT CHANGE UP (ref 0–0.07)
IMM GRANULOCYTES NFR BLD AUTO: 0.3 % — SIGNIFICANT CHANGE UP (ref 0–0.9)
INR BLD: 1.27 RATIO — HIGH (ref 0.85–1.16)
KETONES UR QL: 40 MG/DL
LEUKOCYTE ESTERASE UR-ACNC: ABNORMAL
LYMPHOCYTES # BLD AUTO: 0.27 K/UL — LOW (ref 1–3.3)
LYMPHOCYTES # BLD MANUAL: 0.45 K/UL — LOW (ref 1–3.3)
LYMPHOCYTES NFR BLD AUTO: 2.6 % — LOW (ref 13–44)
LYMPHOCYTES NFR BLD MANUAL: 4.3 % — LOW (ref 13–44)
MANUAL NEUTROPHIL BANDS #: 0.27 K/UL — SIGNIFICANT CHANGE UP (ref 0–0.84)
MANUAL SMEAR VERIFICATION: SIGNIFICANT CHANGE UP
MCHC RBC-ENTMCNC: 28.7 PG — SIGNIFICANT CHANGE UP (ref 27–34)
MCHC RBC-ENTMCNC: 32.3 G/DL — SIGNIFICANT CHANGE UP (ref 32–36)
MCV RBC AUTO: 88.9 FL — SIGNIFICANT CHANGE UP (ref 80–100)
MONOCYTES # BLD AUTO: 0.55 K/UL — SIGNIFICANT CHANGE UP (ref 0–0.9)
MONOCYTES # BLD MANUAL: 0.27 K/UL — SIGNIFICANT CHANGE UP (ref 0–0.9)
MONOCYTES NFR BLD AUTO: 5.2 % — SIGNIFICANT CHANGE UP (ref 2–14)
MONOCYTES NFR BLD MANUAL: 2.6 % — SIGNIFICANT CHANGE UP (ref 2–14)
NEUTROPHILS # BLD AUTO: 9.64 K/UL — HIGH (ref 1.8–7.4)
NEUTROPHILS # BLD MANUAL: 9.52 K/UL — HIGH (ref 1.8–7.4)
NEUTROPHILS NFR BLD AUTO: 91.6 % — HIGH (ref 43–77)
NEUTROPHILS NFR BLD MANUAL: 90.5 % — HIGH (ref 43–77)
NEUTS BAND # BLD: 2.6 % — SIGNIFICANT CHANGE UP (ref 0–8)
NEUTS BAND NFR BLD: 2.6 % — SIGNIFICANT CHANGE UP (ref 0–8)
NITRITE UR-MCNC: NEGATIVE — SIGNIFICANT CHANGE UP
NRBC # BLD AUTO: 0 K/UL — SIGNIFICANT CHANGE UP (ref 0–0)
NRBC # FLD: 0 K/UL — SIGNIFICANT CHANGE UP (ref 0–0)
NRBC BLD AUTO-RTO: 0 /100 WBCS — SIGNIFICANT CHANGE UP (ref 0–0)
PH UR: 5.5 — SIGNIFICANT CHANGE UP (ref 5–8)
PLAT MORPH BLD: NORMAL — SIGNIFICANT CHANGE UP
PLATELET # BLD AUTO: 203 K/UL — SIGNIFICANT CHANGE UP (ref 150–400)
PMV BLD: 9 FL — SIGNIFICANT CHANGE UP (ref 7–13)
POTASSIUM SERPL-MCNC: 4.2 MMOL/L — SIGNIFICANT CHANGE UP (ref 3.5–5.3)
POTASSIUM SERPL-SCNC: 4.2 MMOL/L — SIGNIFICANT CHANGE UP (ref 3.5–5.3)
PROT SERPL-MCNC: 7.3 G/DL — SIGNIFICANT CHANGE UP (ref 6–8.3)
PROT UR-MCNC: 30 MG/DL
PROTHROM AB SERPL-ACNC: 14.6 SEC — HIGH (ref 9.9–13.4)
RBC # BLD: 4.85 M/UL — SIGNIFICANT CHANGE UP (ref 4.2–5.8)
RBC # FLD: 14 % — SIGNIFICANT CHANGE UP (ref 10.3–14.5)
RBC BLD AUTO: SIGNIFICANT CHANGE UP
RBC CASTS # UR COMP ASSIST: 21 /HPF — HIGH (ref 0–4)
REVIEW: SIGNIFICANT CHANGE UP
RSV RNA NPH QL NAA+NON-PROBE: SIGNIFICANT CHANGE UP
SARS-COV-2 RNA SPEC QL NAA+PROBE: SIGNIFICANT CHANGE UP
SODIUM SERPL-SCNC: 138 MMOL/L — SIGNIFICANT CHANGE UP (ref 135–145)
SOURCE RESPIRATORY: SIGNIFICANT CHANGE UP
SP GR SPEC: 1.03 — SIGNIFICANT CHANGE UP (ref 1–1.03)
SQUAMOUS # UR AUTO: 5 /HPF — SIGNIFICANT CHANGE UP (ref 0–5)
TROPONIN T, HIGH SENSITIVITY RESULT: 22 NG/L — SIGNIFICANT CHANGE UP (ref 0–51)
UROBILINOGEN FLD QL: 1 MG/DL — SIGNIFICANT CHANGE UP (ref 0.2–1)
WBC # BLD: 10.52 K/UL — HIGH (ref 3.8–10.5)
WBC # FLD AUTO: 10.52 K/UL — HIGH (ref 3.8–10.5)
WBC UR QL: 6 /HPF — HIGH (ref 0–5)

## 2025-07-22 PROCEDURE — 85025 COMPLETE CBC W/AUTO DIFF WBC: CPT

## 2025-07-22 PROCEDURE — 85014 HEMATOCRIT: CPT

## 2025-07-22 PROCEDURE — 78227 HEPATOBIL SYST IMAGE W/DRUG: CPT

## 2025-07-22 PROCEDURE — 71045 X-RAY EXAM CHEST 1 VIEW: CPT

## 2025-07-22 PROCEDURE — 36415 COLL VENOUS BLD VENIPUNCTURE: CPT

## 2025-07-22 PROCEDURE — 84295 ASSAY OF SERUM SODIUM: CPT

## 2025-07-22 PROCEDURE — A9585: CPT

## 2025-07-22 PROCEDURE — 93010 ELECTROCARDIOGRAM REPORT: CPT

## 2025-07-22 PROCEDURE — 87040 BLOOD CULTURE FOR BACTERIA: CPT

## 2025-07-22 PROCEDURE — 85730 THROMBOPLASTIN TIME PARTIAL: CPT

## 2025-07-22 PROCEDURE — 85018 HEMOGLOBIN: CPT

## 2025-07-22 PROCEDURE — 82247 BILIRUBIN TOTAL: CPT

## 2025-07-22 PROCEDURE — 82947 ASSAY GLUCOSE BLOOD QUANT: CPT

## 2025-07-22 PROCEDURE — 82435 ASSAY OF BLOOD CHLORIDE: CPT

## 2025-07-22 PROCEDURE — A9537: CPT

## 2025-07-22 PROCEDURE — 84484 ASSAY OF TROPONIN QUANT: CPT

## 2025-07-22 PROCEDURE — 84132 ASSAY OF SERUM POTASSIUM: CPT

## 2025-07-22 PROCEDURE — 83605 ASSAY OF LACTIC ACID: CPT

## 2025-07-22 PROCEDURE — 76705 ECHO EXAM OF ABDOMEN: CPT | Mod: 26

## 2025-07-22 PROCEDURE — 82330 ASSAY OF CALCIUM: CPT

## 2025-07-22 PROCEDURE — 87637 SARSCOV2&INF A&B&RSV AMP PRB: CPT

## 2025-07-22 PROCEDURE — 78227 HEPATOBIL SYST IMAGE W/DRUG: CPT | Mod: 26

## 2025-07-22 PROCEDURE — 74183 MRI ABD W/O CNTR FLWD CNTR: CPT

## 2025-07-22 PROCEDURE — 82803 BLOOD GASES ANY COMBINATION: CPT

## 2025-07-22 PROCEDURE — 71045 X-RAY EXAM CHEST 1 VIEW: CPT | Mod: 26

## 2025-07-22 PROCEDURE — 74183 MRI ABD W/O CNTR FLWD CNTR: CPT | Mod: 26

## 2025-07-22 PROCEDURE — 82248 BILIRUBIN DIRECT: CPT

## 2025-07-22 PROCEDURE — 85610 PROTHROMBIN TIME: CPT

## 2025-07-22 PROCEDURE — 80053 COMPREHEN METABOLIC PANEL: CPT

## 2025-07-22 PROCEDURE — 76705 ECHO EXAM OF ABDOMEN: CPT

## 2025-07-22 PROCEDURE — 81001 URINALYSIS AUTO W/SCOPE: CPT

## 2025-07-22 RX ORDER — PIPERACILLIN-TAZO-DEXTROSE,ISO 3.375G/5
3.38 IV SOLUTION, PIGGYBACK PREMIX FROZEN(ML) INTRAVENOUS EVERY 8 HOURS
Refills: 0 | Status: DISCONTINUED | OUTPATIENT
Start: 2025-07-22 | End: 2025-07-23

## 2025-07-22 RX ORDER — CARBIDOPA/LEVODOPA 25MG-100MG
1 TABLET ORAL ONCE
Refills: 0 | Status: COMPLETED | OUTPATIENT
Start: 2025-07-22 | End: 2025-07-22

## 2025-07-22 RX ORDER — ACETAMINOPHEN 500 MG/5ML
1000 LIQUID (ML) ORAL ONCE
Refills: 0 | Status: COMPLETED | OUTPATIENT
Start: 2025-07-22 | End: 2025-07-22

## 2025-07-22 RX ORDER — ACETAMINOPHEN 500 MG/5ML
650 LIQUID (ML) ORAL EVERY 6 HOURS
Refills: 0 | Status: DISCONTINUED | OUTPATIENT
Start: 2025-07-22 | End: 2025-07-23

## 2025-07-22 RX ORDER — VANCOMYCIN HCL IN 5 % DEXTROSE 1.5G/250ML
1000 PLASTIC BAG, INJECTION (ML) INTRAVENOUS ONCE
Refills: 0 | Status: COMPLETED | OUTPATIENT
Start: 2025-07-22 | End: 2025-07-22

## 2025-07-22 RX ORDER — MIDODRINE HYDROCHLORIDE 5 MG/1
7.5 TABLET ORAL ONCE
Refills: 0 | Status: DISCONTINUED | OUTPATIENT
Start: 2025-07-22 | End: 2025-07-22

## 2025-07-22 RX ORDER — ENOXAPARIN SODIUM 100 MG/ML
40 INJECTION SUBCUTANEOUS EVERY 24 HOURS
Refills: 0 | Status: DISCONTINUED | OUTPATIENT
Start: 2025-07-22 | End: 2025-07-22

## 2025-07-22 RX ORDER — SODIUM CHLORIDE 9 G/1000ML
1000 INJECTION, SOLUTION INTRAVENOUS
Refills: 0 | Status: DISCONTINUED | OUTPATIENT
Start: 2025-07-22 | End: 2025-07-23

## 2025-07-22 RX ORDER — ENOXAPARIN SODIUM 100 MG/ML
40 INJECTION SUBCUTANEOUS ONCE
Refills: 0 | Status: COMPLETED | OUTPATIENT
Start: 2025-07-22 | End: 2025-07-22

## 2025-07-22 RX ORDER — PIPERACILLIN-TAZO-DEXTROSE,ISO 3.375G/5
3.38 IV SOLUTION, PIGGYBACK PREMIX FROZEN(ML) INTRAVENOUS ONCE
Refills: 0 | Status: COMPLETED | OUTPATIENT
Start: 2025-07-22 | End: 2025-07-22

## 2025-07-22 RX ORDER — CARBIDOPA/LEVODOPA 25MG-100MG
1 TABLET ORAL
Refills: 0 | Status: DISCONTINUED | OUTPATIENT
Start: 2025-07-22 | End: 2025-07-23

## 2025-07-22 RX ORDER — MIDODRINE HYDROCHLORIDE 5 MG/1
7.5 TABLET ORAL
Refills: 0 | Status: DISCONTINUED | OUTPATIENT
Start: 2025-07-22 | End: 2025-07-23

## 2025-07-22 RX ADMIN — Medication 25 GRAM(S): at 09:15

## 2025-07-22 RX ADMIN — Medication 1000 MILLIGRAM(S): at 07:45

## 2025-07-22 RX ADMIN — Medication 1000 MILLILITER(S): at 03:39

## 2025-07-22 RX ADMIN — Medication 200 GRAM(S): at 03:39

## 2025-07-22 RX ADMIN — Medication 1 TABLET(S): at 20:08

## 2025-07-22 RX ADMIN — ENOXAPARIN SODIUM 40 MILLIGRAM(S): 100 INJECTION SUBCUTANEOUS at 20:22

## 2025-07-22 RX ADMIN — Medication 250 MILLIGRAM(S): at 05:00

## 2025-07-22 RX ADMIN — SODIUM CHLORIDE 120 MILLILITER(S): 9 INJECTION, SOLUTION INTRAVENOUS at 09:27

## 2025-07-22 RX ADMIN — SODIUM CHLORIDE 75 MILLILITER(S): 9 INJECTION, SOLUTION INTRAVENOUS at 15:07

## 2025-07-22 RX ADMIN — Medication 1 TABLET(S): at 15:07

## 2025-07-22 RX ADMIN — Medication 400 MILLIGRAM(S): at 03:39

## 2025-07-22 RX ADMIN — Medication 25 GRAM(S): at 20:22

## 2025-07-22 RX ADMIN — Medication 1 TABLET(S): at 07:37

## 2025-07-23 ENCOUNTER — RESULT REVIEW (OUTPATIENT)
Age: 80
End: 2025-07-23

## 2025-07-23 ENCOUNTER — TRANSCRIPTION ENCOUNTER (OUTPATIENT)
Age: 80
End: 2025-07-23

## 2025-07-23 VITALS
SYSTOLIC BLOOD PRESSURE: 167 MMHG | OXYGEN SATURATION: 95 % | RESPIRATION RATE: 18 BRPM | DIASTOLIC BLOOD PRESSURE: 84 MMHG | TEMPERATURE: 100 F | HEART RATE: 70 BPM

## 2025-07-23 LAB
ALBUMIN SERPL ELPH-MCNC: 3.7 G/DL — SIGNIFICANT CHANGE UP (ref 3.3–5)
ALP SERPL-CCNC: 103 U/L — SIGNIFICANT CHANGE UP (ref 40–120)
ALT FLD-CCNC: 54 U/L — HIGH (ref 10–45)
ANION GAP SERPL CALC-SCNC: 13 MMOL/L — SIGNIFICANT CHANGE UP (ref 5–17)
APTT BLD: 33.5 SEC — SIGNIFICANT CHANGE UP (ref 26.1–36.8)
AST SERPL-CCNC: 95 U/L — HIGH (ref 10–40)
BILIRUB SERPL-MCNC: 1.3 MG/DL — HIGH (ref 0.2–1.2)
BLD GP AB SCN SERPL QL: NEGATIVE — SIGNIFICANT CHANGE UP
BUN SERPL-MCNC: 17 MG/DL — SIGNIFICANT CHANGE UP (ref 7–23)
CALCIUM SERPL-MCNC: 9 MG/DL — SIGNIFICANT CHANGE UP (ref 8.4–10.5)
CHLORIDE SERPL-SCNC: 102 MMOL/L — SIGNIFICANT CHANGE UP (ref 96–108)
CO2 SERPL-SCNC: 21 MMOL/L — LOW (ref 22–31)
CREAT SERPL-MCNC: 0.99 MG/DL — SIGNIFICANT CHANGE UP (ref 0.5–1.3)
EGFR: 77 ML/MIN/1.73M2 — SIGNIFICANT CHANGE UP
EGFR: 77 ML/MIN/1.73M2 — SIGNIFICANT CHANGE UP
GLUCOSE SERPL-MCNC: 106 MG/DL — HIGH (ref 70–99)
HCT VFR BLD CALC: 39.4 % — SIGNIFICANT CHANGE UP (ref 39–50)
HGB BLD-MCNC: 12.8 G/DL — LOW (ref 13–17)
INR BLD: 1.33 RATIO — HIGH (ref 0.85–1.16)
MAGNESIUM SERPL-MCNC: 1.6 MG/DL — SIGNIFICANT CHANGE UP (ref 1.6–2.6)
MCHC RBC-ENTMCNC: 28.4 PG — SIGNIFICANT CHANGE UP (ref 27–34)
MCHC RBC-ENTMCNC: 32.5 G/DL — SIGNIFICANT CHANGE UP (ref 32–36)
MCV RBC AUTO: 87.4 FL — SIGNIFICANT CHANGE UP (ref 80–100)
NRBC # BLD AUTO: 0 K/UL — SIGNIFICANT CHANGE UP (ref 0–0)
NRBC # FLD: 0 K/UL — SIGNIFICANT CHANGE UP (ref 0–0)
NRBC BLD AUTO-RTO: 0 /100 WBCS — SIGNIFICANT CHANGE UP (ref 0–0)
PHOSPHATE SERPL-MCNC: 1.5 MG/DL — LOW (ref 2.5–4.5)
PLATELET # BLD AUTO: 174 K/UL — SIGNIFICANT CHANGE UP (ref 150–400)
PMV BLD: 9.3 FL — SIGNIFICANT CHANGE UP (ref 7–13)
POTASSIUM SERPL-MCNC: 3.5 MMOL/L — SIGNIFICANT CHANGE UP (ref 3.5–5.3)
POTASSIUM SERPL-SCNC: 3.5 MMOL/L — SIGNIFICANT CHANGE UP (ref 3.5–5.3)
PROT SERPL-MCNC: 6.4 G/DL — SIGNIFICANT CHANGE UP (ref 6–8.3)
PROTHROM AB SERPL-ACNC: 15.1 SEC — HIGH (ref 9.9–13.4)
RBC # BLD: 4.51 M/UL — SIGNIFICANT CHANGE UP (ref 4.2–5.8)
RBC # FLD: 13.8 % — SIGNIFICANT CHANGE UP (ref 10.3–14.5)
RH IG SCN BLD-IMP: POSITIVE — SIGNIFICANT CHANGE UP
SODIUM SERPL-SCNC: 136 MMOL/L — SIGNIFICANT CHANGE UP (ref 135–145)
WBC # BLD: 7.86 K/UL — SIGNIFICANT CHANGE UP (ref 3.8–10.5)
WBC # FLD AUTO: 7.86 K/UL — SIGNIFICANT CHANGE UP (ref 3.8–10.5)

## 2025-07-23 PROCEDURE — 86900 BLOOD TYPING SEROLOGIC ABO: CPT

## 2025-07-23 PROCEDURE — 84100 ASSAY OF PHOSPHORUS: CPT

## 2025-07-23 PROCEDURE — 85018 HEMOGLOBIN: CPT

## 2025-07-23 PROCEDURE — 86850 RBC ANTIBODY SCREEN: CPT

## 2025-07-23 PROCEDURE — 83605 ASSAY OF LACTIC ACID: CPT

## 2025-07-23 PROCEDURE — 82435 ASSAY OF BLOOD CHLORIDE: CPT

## 2025-07-23 PROCEDURE — 85730 THROMBOPLASTIN TIME PARTIAL: CPT

## 2025-07-23 PROCEDURE — 82330 ASSAY OF CALCIUM: CPT

## 2025-07-23 PROCEDURE — A9585: CPT

## 2025-07-23 PROCEDURE — 86901 BLOOD TYPING SEROLOGIC RH(D): CPT

## 2025-07-23 PROCEDURE — 99285 EMERGENCY DEPT VISIT HI MDM: CPT

## 2025-07-23 PROCEDURE — 96374 THER/PROPH/DIAG INJ IV PUSH: CPT

## 2025-07-23 PROCEDURE — 87637 SARSCOV2&INF A&B&RSV AMP PRB: CPT

## 2025-07-23 PROCEDURE — 82248 BILIRUBIN DIRECT: CPT

## 2025-07-23 PROCEDURE — 74183 MRI ABD W/O CNTR FLWD CNTR: CPT

## 2025-07-23 PROCEDURE — 82947 ASSAY GLUCOSE BLOOD QUANT: CPT

## 2025-07-23 PROCEDURE — 85610 PROTHROMBIN TIME: CPT

## 2025-07-23 PROCEDURE — 85027 COMPLETE CBC AUTOMATED: CPT

## 2025-07-23 PROCEDURE — 93005 ELECTROCARDIOGRAM TRACING: CPT

## 2025-07-23 PROCEDURE — 82803 BLOOD GASES ANY COMBINATION: CPT

## 2025-07-23 PROCEDURE — 80053 COMPREHEN METABOLIC PANEL: CPT

## 2025-07-23 PROCEDURE — 96375 TX/PRO/DX INJ NEW DRUG ADDON: CPT

## 2025-07-23 PROCEDURE — 87040 BLOOD CULTURE FOR BACTERIA: CPT

## 2025-07-23 PROCEDURE — 85014 HEMATOCRIT: CPT

## 2025-07-23 PROCEDURE — 84295 ASSAY OF SERUM SODIUM: CPT

## 2025-07-23 PROCEDURE — 84484 ASSAY OF TROPONIN QUANT: CPT

## 2025-07-23 PROCEDURE — 76705 ECHO EXAM OF ABDOMEN: CPT

## 2025-07-23 PROCEDURE — C8929: CPT

## 2025-07-23 PROCEDURE — A9537: CPT

## 2025-07-23 PROCEDURE — 85025 COMPLETE CBC W/AUTO DIFF WBC: CPT

## 2025-07-23 PROCEDURE — 71045 X-RAY EXAM CHEST 1 VIEW: CPT

## 2025-07-23 PROCEDURE — 84132 ASSAY OF SERUM POTASSIUM: CPT

## 2025-07-23 PROCEDURE — 83735 ASSAY OF MAGNESIUM: CPT

## 2025-07-23 PROCEDURE — 93306 TTE W/DOPPLER COMPLETE: CPT | Mod: 26

## 2025-07-23 PROCEDURE — 82247 BILIRUBIN TOTAL: CPT

## 2025-07-23 PROCEDURE — 36415 COLL VENOUS BLD VENIPUNCTURE: CPT

## 2025-07-23 PROCEDURE — 78227 HEPATOBIL SYST IMAGE W/DRUG: CPT

## 2025-07-23 PROCEDURE — 81001 URINALYSIS AUTO W/SCOPE: CPT

## 2025-07-23 RX ORDER — MAGNESIUM SULFATE 500 MG/ML
2 SYRINGE (ML) INJECTION ONCE
Refills: 0 | Status: COMPLETED | OUTPATIENT
Start: 2025-07-23 | End: 2025-07-23

## 2025-07-23 RX ORDER — AMOXICILLIN AND CLAVULANATE POTASSIUM 500; 125 MG/1; MG/1
1 TABLET, FILM COATED ORAL
Qty: 12 | Refills: 0
Start: 2025-07-23 | End: 2025-07-28

## 2025-07-23 RX ORDER — ACETAMINOPHEN 500 MG/5ML
2 LIQUID (ML) ORAL
Qty: 0 | Refills: 0 | DISCHARGE
Start: 2025-07-23

## 2025-07-23 RX ORDER — POTASSIUM PHOSPHATE, MONOBASIC POTASSIUM PHOSPHATE, DIBASIC INJECTION, 236; 224 MG/ML; MG/ML
30 SOLUTION, CONCENTRATE INTRAVENOUS ONCE
Refills: 0 | Status: COMPLETED | OUTPATIENT
Start: 2025-07-23 | End: 2025-07-23

## 2025-07-23 RX ADMIN — POTASSIUM PHOSPHATE, MONOBASIC POTASSIUM PHOSPHATE, DIBASIC INJECTION, 83.33 MILLIMOLE(S): 236; 224 SOLUTION, CONCENTRATE INTRAVENOUS at 11:08

## 2025-07-23 RX ADMIN — Medication 20 MILLIEQUIVALENT(S): at 11:02

## 2025-07-23 RX ADMIN — Medication 25 GRAM(S): at 13:05

## 2025-07-23 RX ADMIN — Medication 1 TABLET(S): at 05:15

## 2025-07-23 RX ADMIN — Medication 25 GRAM(S): at 03:47

## 2025-07-23 RX ADMIN — Medication 1 TABLET(S): at 16:39

## 2025-07-23 RX ADMIN — Medication 1 TABLET(S): at 13:03

## 2025-07-23 RX ADMIN — Medication 20 MILLIEQUIVALENT(S): at 16:46

## 2025-07-23 RX ADMIN — Medication 25 GRAM(S): at 11:11

## 2025-07-27 LAB
CULTURE RESULTS: SIGNIFICANT CHANGE UP
CULTURE RESULTS: SIGNIFICANT CHANGE UP
SPECIMEN SOURCE: SIGNIFICANT CHANGE UP
SPECIMEN SOURCE: SIGNIFICANT CHANGE UP

## (undated) DEVICE — SUT BIOSYN 4-0 18" P-12

## (undated) DEVICE — WARMING BLANKET UPPER ADULT

## (undated) DEVICE — GLV 7 PROTEXIS (WHITE)

## (undated) DEVICE — GLV 6.5 PROTEXIS (WHITE)

## (undated) DEVICE — SUT POLYSORB 1 36" GS-21 UNDYED

## (undated) DEVICE — DRAPE INSTRUMENT POUCH 6.75" X 11"

## (undated) DEVICE — HOOD FLYTE STRYKER HELMET SHIELD

## (undated) DEVICE — SUT ETHIBOND EXCEL 2 30" OS-4

## (undated) DEVICE — SYR LUER LOK 20CC

## (undated) DEVICE — DRAPE MAYO STAND 30"

## (undated) DEVICE — DRSG STERISTRIPS 0.5 X 4"

## (undated) DEVICE — SOL IRR POUR NS 0.9% 500ML

## (undated) DEVICE — VISITEC 4X4

## (undated) DEVICE — SYR LUER LOK 10CC

## (undated) DEVICE — GLV 8 PROTEXIS (WHITE)

## (undated) DEVICE — SPECIMEN CONTAINER 100ML

## (undated) DEVICE — SUCTION YANKAUER NO CONTROL VENT

## (undated) DEVICE — DRILL BIT BIOMET RINGLOCK QUICK CONNECT 3.2MM X 30MM

## (undated) DEVICE — MEDICATION LABELS W MARKER

## (undated) DEVICE — DRSG AQUACEL 3.5 X 14"

## (undated) DEVICE — SAW BLADE MICROAIRE OSCILATING 25.4MM X 90MM X 1.27MM

## (undated) DEVICE — DRSG AQUACEL 3.5 X 10"

## (undated) DEVICE — POSITIONER FOAM HEADREST (PINK)

## (undated) DEVICE — STAPLER SKIN VISI-STAT 35 WIDE

## (undated) DEVICE — PRESSURIZER FEM CNL W/O HUB MED

## (undated) DEVICE — SOL IRR BAG NS 0.9% 3000ML

## (undated) DEVICE — SOL IRR POUR H2O 1000ML

## (undated) DEVICE — DRSG TAPE MICROFOAM 3"

## (undated) DEVICE — Device

## (undated) DEVICE — PACK TOTAL HIP (2 PACKS)

## (undated) DEVICE — SUT POLYSORB 2-0 30" GS-21 UNDYED

## (undated) DEVICE — TAPE SILK 3"

## (undated) DEVICE — GLV 7.5 PROTEXIS (WHITE)

## (undated) DEVICE — FOLEY TRAY 16FR 5CC LTX UMETER CLOSED

## (undated) DEVICE — DRAPE IOBAN 23" X 23"

## (undated) DEVICE — MARKING PEN W RULER

## (undated) DEVICE — GLV 8.5 PROTEXIS (WHITE)

## (undated) DEVICE — SUT POLYSORB 0 30" GS-21 UNDYED

## (undated) DEVICE — VENODYNE/SCD SLEEVE CALF LARGE

## (undated) DEVICE — SUT PDS II 1 54" TP-1

## (undated) DEVICE — GOWN TRIMAX LG

## (undated) DEVICE — LAP PAD 18 X 18"

## (undated) DEVICE — GLV 9 DURAPRENE

## (undated) DEVICE — POSITIONER FOAM EGG CRATE ULNAR 2PCS (PINK)

## (undated) DEVICE — SAW BLADE MICROAIRE RECIPROCATING 12.2MMX80MMX1.78MM

## (undated) DEVICE — DRAIN JACKSON PRATT 3 SPRING RESERVOIR W 10FR PVC DRAIN

## (undated) DEVICE — SUT TICRON 0 30" TIES

## (undated) DEVICE — POSITIONER FOAM ABDUCTION PILLOW MED (PINK)